# Patient Record
Sex: MALE | Race: BLACK OR AFRICAN AMERICAN | NOT HISPANIC OR LATINO | Employment: FULL TIME | URBAN - METROPOLITAN AREA
[De-identification: names, ages, dates, MRNs, and addresses within clinical notes are randomized per-mention and may not be internally consistent; named-entity substitution may affect disease eponyms.]

---

## 2020-08-14 ENCOUNTER — TRANSFERRED RECORDS (OUTPATIENT)
Dept: HEALTH INFORMATION MANAGEMENT | Facility: CLINIC | Age: 25
End: 2020-08-14

## 2020-09-11 ENCOUNTER — TRANSFERRED RECORDS (OUTPATIENT)
Dept: HEALTH INFORMATION MANAGEMENT | Facility: CLINIC | Age: 25
End: 2020-09-11

## 2020-10-09 ENCOUNTER — TRANSFERRED RECORDS (OUTPATIENT)
Dept: HEALTH INFORMATION MANAGEMENT | Facility: CLINIC | Age: 25
End: 2020-10-09

## 2020-10-22 ENCOUNTER — TRANSCRIBE ORDERS (OUTPATIENT)
Dept: OTHER | Age: 25
End: 2020-10-22

## 2020-10-29 ENCOUNTER — PATIENT OUTREACH (OUTPATIENT)
Dept: ONCOLOGY | Facility: CLINIC | Age: 25
End: 2020-10-29

## 2020-10-29 DIAGNOSIS — D57.1 SICKLE CELL DISEASE WITHOUT CRISIS (H): Primary | ICD-10-CM

## 2020-10-29 NOTE — PROGRESS NOTES
"Received referral via email from Mary Kate Goldberg at 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  Extensive records in  Care everywhere.   He rarely has sickle cell pain.  His current regimen is to have a \"partial red cell exchange\" every 4-5 weeks due to his history of macular infarct.   This is performed on hemapheresis machine with peripherally access.  He reports having \"really good\" veins and not needing central line access.   [facility].      He is not taking hydrea.  Pleasant young man, excited to come to [place].  He will move to [place] around November 1 and start his employment on Monday November 9th.  He will be working 2nd shift.      He will meet with the transfusion medicine team to evaluate and develop plan for the cell exchanges and then also meet with Robert Denson MD.  ````````````````````````````````````````````````````````````````````````````````````````````````````````````````````````````````````````````    Active Problem List:  Patient Active Problem List   Diagnosis     History of CVA (cerebrovascular accident)     Vitamin D deficiency     Hb-SS disease without crisis     WILFREDO (obstructive sleep apnea)     Current Medication List:  Current Outpatient Medications   Medication Sig     acetaminophen-codeine (TYLENOL #3) 300-30 mg tablet Paresh 1-2 tabs every 4 hrs as needed for pain     cholecalciferol-vitamin D3 (VITAMIN D3) 2,000 unit tablet Take 1 tablet by mouth daily.     HPI:   Ricky is a 24y 10mo male with severe sickle cell anemia (Hgb SS), on chronic transfusion therapy since 2006, for history of macular infarction, found after he developed visual changes. Vision subsequently returned to normal. Followed by outside neuro-ophthalmologist Dr Kerrison. Last seen Dec 2019. He received simple transfusions for many years, but " was able to switch to partial red cell exchange transfusions in 2013, using peripheral IV access.  His last exchange was 9/11/20. Ricky previously had iron overload, but with exchange transfusions and chelation, his LIC and ferritin normalized.  Recently urine microalbumin was slightly high but it improved at last visit. We are following for now, but if it remains elevated will probably start lisinopril. On CPAP for WILFREDO since 2019.     INTERVAL HISTORY: Ricky reports he has been well. No fevers, no sickle related bone pain. He received his influenza immunization from his employer.  -Health maintenance:  Ophthalmology - last seen in [place] in December 2019  ECHO 9/11/20 -- Normal left ventricular size and function, no evidence of pulmonary hypertension. EF 64%.  Bone Density: 1/11/16  Normal bone mass.  Urine microalbumin - has been slightly elevated 169.8 (1/31/20) and 49.3 (2/28/20), will continue to monitor closely  Hip films 8/14/20, neg for AVN  lmmunizations:   Meningococcal and Pneumococcal Vaccines are current.He has received seasonal influenza immunization (9/22/20). Pneumococcal antibody titers 2/5/19 revealed inadequate immunity.  PPV23 booster given, Bexsero (meningococcal B) series complete. Declined HPV vaccine.  BMT has been previously discussed as a possible option. He has 4 full sibs. Mentioned briefly today. He is also aware of the gene therapy study.  Vit D = 30 8/14/20 - continue supplement to maintain     History of CVA (cerebrovascular accident)  -Hx of macular infarction.  -Continue chronic transfusion, goal is to keep Hgb S%~30. Current level Hgb S pending  -Partial red cell exchange transfusion in hemapheresis unit.    -Surveillance Brain MRI/MRA done 12/23/15 -  both normal.     Iron overload due to repeated red blood cell transfusions - at risk  -Current Ferritin pending. All recents have been normal/near normal, previously on chelation. Chelation is not needed at this time.  -Last  abdominal MRI 2/9/15 revealed: LIC = 2.05 mg/g of liver tissue. Plan to repeat study only if/when ferritin >1000.  -Continues on partial exchange transfusions.     Snoring  -Sleep study revealed WILFREDO. Evaluated by Sleep Medicine 3-22-19; started CPAP which has helped-he is tolerating well.

## 2020-11-02 NOTE — TELEPHONE ENCOUNTER
Action    Action Taken 11/2/20:    -Rosalie Young/[facility] - Rad: [id number]  11:56 AM       RECORDS STATUS - ALL OTHER DIAGNOSIS      RECORDS RECEIVED FROM: [facility]    DATE RECEIVED:    NOTES STATUS DETAILS   OFFICE NOTE from referring provider CE - [facility] Madelyn Del Toro NP: 9/11/20    Also seen:  Nephrology - Kristi: 7/17/20   OFFICE NOTE from medical oncologist [facility] Madelyn Del Toro   DISCHARGE SUMMARY from hospital [facility] 4/8/15   DISCHARGE REPORT from the ER [facility] 5/5/17   Immunizations Records [facility]    OPERATIVE REPORT Epic 10/29/20: Red Cell Exchange   MEDICATION LIST CE    CLINICAL TRIAL TREATMENTS TO DATE     LABS     PATHOLOGY REPORTS     ANYTHING RELATED TO DIAGNOSIS Care Everywhere 10/9/20 - 4/2017   GENONOMIC TESTING     TYPE:     IMAGING (NEED IMAGES & REPORT)     XR Requested 11/2 8/14/20: [facility], Report in Epic/   CT SCANS     MRI Requested 11/2 12/23/15: [facility], Report in [facility]/   MAMMO     ULTRASOUND     PET

## 2020-11-06 ENCOUNTER — APPOINTMENT (OUTPATIENT)
Dept: LAB | Facility: CLINIC | Age: 25
End: 2020-11-06
Payer: COMMERCIAL

## 2020-11-06 ENCOUNTER — ONCOLOGY VISIT (OUTPATIENT)
Dept: ONCOLOGY | Facility: CLINIC | Age: 25
End: 2020-11-06
Payer: COMMERCIAL

## 2020-11-06 ENCOUNTER — PRE VISIT (OUTPATIENT)
Dept: ONCOLOGY | Facility: CLINIC | Age: 25
End: 2020-11-06

## 2020-11-06 ENCOUNTER — HOSPITAL ENCOUNTER (OUTPATIENT)
Dept: LAB | Facility: CLINIC | Age: 25
End: 2020-11-06
Attending: PEDIATRICS
Payer: COMMERCIAL

## 2020-11-06 VITALS
DIASTOLIC BLOOD PRESSURE: 77 MMHG | BODY MASS INDEX: 31.96 KG/M2 | WEIGHT: 241.18 LBS | HEIGHT: 73 IN | SYSTOLIC BLOOD PRESSURE: 121 MMHG | RESPIRATION RATE: 16 BRPM | TEMPERATURE: 98.1 F | HEART RATE: 74 BPM

## 2020-11-06 VITALS
WEIGHT: 241.4 LBS | DIASTOLIC BLOOD PRESSURE: 88 MMHG | BODY MASS INDEX: 31.99 KG/M2 | TEMPERATURE: 98.1 F | HEIGHT: 73 IN | RESPIRATION RATE: 18 BRPM | OXYGEN SATURATION: 100 % | HEART RATE: 65 BPM | SYSTOLIC BLOOD PRESSURE: 129 MMHG

## 2020-11-06 DIAGNOSIS — D57.1 SICKLE CELL DISEASE WITHOUT CRISIS (H): Primary | ICD-10-CM

## 2020-11-06 PROBLEM — G47.33 OSA (OBSTRUCTIVE SLEEP APNEA): Status: ACTIVE | Noted: 2019-02-21

## 2020-11-06 LAB
ALBUMIN SERPL-MCNC: 4.2 G/DL (ref 3.4–5)
ALBUMIN UR-MCNC: NEGATIVE MG/DL
ALP SERPL-CCNC: 94 U/L (ref 40–150)
ALT SERPL W P-5'-P-CCNC: 75 U/L (ref 0–70)
ANION GAP SERPL CALCULATED.3IONS-SCNC: 5 MMOL/L (ref 3–14)
APPEARANCE UR: CLEAR
AST SERPL W P-5'-P-CCNC: 40 U/L (ref 0–45)
BASOPHILS # BLD AUTO: 0.1 10E9/L (ref 0–0.2)
BASOPHILS NFR BLD AUTO: 1.3 %
BILIRUB SERPL-MCNC: 1 MG/DL (ref 0.2–1.3)
BILIRUB UR QL STRIP: NEGATIVE
BUN SERPL-MCNC: 10 MG/DL (ref 7–30)
CALCIUM SERPL-MCNC: 8.8 MG/DL (ref 8.5–10.1)
CHLORIDE SERPL-SCNC: 102 MMOL/L (ref 94–109)
CO2 SERPL-SCNC: 28 MMOL/L (ref 20–32)
COLOR UR AUTO: YELLOW
CREAT SERPL-MCNC: 0.74 MG/DL (ref 0.66–1.25)
DIFFERENTIAL METHOD BLD: ABNORMAL
EOSINOPHIL # BLD AUTO: 0.2 10E9/L (ref 0–0.7)
EOSINOPHIL NFR BLD AUTO: 2.6 %
ERYTHROCYTE [DISTWIDTH] IN BLOOD BY AUTOMATED COUNT: 18.3 % (ref 10–15)
GFR SERPL CREATININE-BSD FRML MDRD: >90 ML/MIN/{1.73_M2}
GLUCOSE SERPL-MCNC: 82 MG/DL (ref 70–99)
GLUCOSE UR STRIP-MCNC: NEGATIVE MG/DL
HCT VFR BLD AUTO: 37.8 % (ref 40–53)
HGB BLD-MCNC: 12 G/DL (ref 13.3–17.7)
HGB UR QL STRIP: NEGATIVE
IMM GRANULOCYTES # BLD: 0 10E9/L (ref 0–0.4)
IMM GRANULOCYTES NFR BLD: 0.3 %
KETONES UR STRIP-MCNC: NEGATIVE MG/DL
LEUKOCYTE ESTERASE UR QL STRIP: NEGATIVE
LYMPHOCYTES # BLD AUTO: 2.4 10E9/L (ref 0.8–5.3)
LYMPHOCYTES NFR BLD AUTO: 29.5 %
MCH RBC QN AUTO: 26.5 PG (ref 26.5–33)
MCHC RBC AUTO-ENTMCNC: 31.7 G/DL (ref 31.5–36.5)
MCV RBC AUTO: 83 FL (ref 78–100)
MONOCYTES # BLD AUTO: 0.7 10E9/L (ref 0–1.3)
MONOCYTES NFR BLD AUTO: 9.2 %
MUCOUS THREADS #/AREA URNS LPF: PRESENT /LPF
NEUTROPHILS # BLD AUTO: 4.6 10E9/L (ref 1.6–8.3)
NEUTROPHILS NFR BLD AUTO: 57.1 %
NITRATE UR QL: NEGATIVE
NRBC # BLD AUTO: 0 10*3/UL
NRBC BLD AUTO-RTO: 0 /100
PH UR STRIP: 5 PH (ref 5–7)
PLATELET # BLD AUTO: 549 10E9/L (ref 150–450)
POTASSIUM SERPL-SCNC: 3.5 MMOL/L (ref 3.4–5.3)
PROT SERPL-MCNC: 8.4 G/DL (ref 6.8–8.8)
RBC # BLD AUTO: 4.53 10E12/L (ref 4.4–5.9)
RBC #/AREA URNS AUTO: 0 /HPF (ref 0–2)
SODIUM SERPL-SCNC: 135 MMOL/L (ref 133–144)
SOURCE: ABNORMAL
SP GR UR STRIP: 1.01 (ref 1–1.03)
UROBILINOGEN UR STRIP-MCNC: 0 MG/DL (ref 0–2)
WBC # BLD AUTO: 8 10E9/L (ref 4–11)
WBC #/AREA URNS AUTO: 1 /HPF (ref 0–5)

## 2020-11-06 PROCEDURE — 36415 COLL VENOUS BLD VENIPUNCTURE: CPT | Performed by: PEDIATRICS

## 2020-11-06 PROCEDURE — 99204 OFFICE O/P NEW MOD 45 MIN: CPT | Performed by: PEDIATRICS

## 2020-11-06 PROCEDURE — 99207 PR SATISFY VISIT NUMBER: CPT | Performed by: PATHOLOGY

## 2020-11-06 PROCEDURE — 85025 COMPLETE CBC W/AUTO DIFF WBC: CPT | Performed by: PEDIATRICS

## 2020-11-06 PROCEDURE — 83021 HEMOGLOBIN CHROMOTOGRAPHY: CPT | Performed by: PEDIATRICS

## 2020-11-06 PROCEDURE — G0463 HOSPITAL OUTPT CLINIC VISIT: HCPCS

## 2020-11-06 PROCEDURE — 85660 RBC SICKLE CELL TEST: CPT | Performed by: PEDIATRICS

## 2020-11-06 PROCEDURE — 81001 URINALYSIS AUTO W/SCOPE: CPT | Performed by: PEDIATRICS

## 2020-11-06 PROCEDURE — 80053 COMPREHEN METABOLIC PANEL: CPT | Performed by: PEDIATRICS

## 2020-11-06 RX ORDER — ACETAMINOPHEN AND CODEINE PHOSPHATE 300; 30 MG/1; MG/1
1-2 TABLET ORAL PRN
COMMUNITY
End: 2024-07-01

## 2020-11-06 RX ORDER — HYDROCODONE BITARTRATE AND ACETAMINOPHEN 5; 325 MG/1; MG/1
1-2 TABLET ORAL PRN
COMMUNITY
Start: 2019-09-16 | End: 2024-07-01

## 2020-11-06 ASSESSMENT — MIFFLIN-ST. JEOR
SCORE: 2142.88
SCORE: 2143.86

## 2020-11-06 ASSESSMENT — PAIN SCALES - GENERAL: PAINLEVEL: NO PAIN (0)

## 2020-11-06 NOTE — CONSULTS
"APHERESIS INITIAL CONSULT CHECKLIST    Current Encounter Information  Current Encounter Information: Reason for Visit, Allergies and Current Meds  Procedure Requested: Red Blood Cell Exchange  History of: (Reason for Apheresis): Sickle Cell    Access Assessment  Access Assessment  Vein Assessment:  Veins are adequate: Yes  Needs a catheter placed for Apheresis?: No    Vital Signs  Vital Signs  BP: 121/77  Pulse: 74  Temp: 98.1  F (36.7  C)  Temp src: Oral  Resp: 16  Height: 185.4 cm (6' 1\")  Weight: 109.4 kg (241 lb 2.9 oz)    Reviewed   Review With Patient  Have you read the brochure Getting ready for Apheresis?: Yes  Have you had any invasive procedures, surgery, biopsy, bleeding in the last month?: No  Review medications and allergies: Yes(Tylenol only as needed) ALLERGY: BENADRYL  Have you ever been transfused?: Yes  Do you require pre-medication for blood products?: Yes(Atarax and Tylenol only)  Patient given tour of the unit: Yes    Additional Information  Notes, needs and time spent with patient  Explain procedure, side effects or reactions, instructions: Yes  Patient has special need?: No    Time spent: 30 minutes face to face time spent with pt to review the RBCx procedure &  possible reactions. Patient has had multiple RBC Exchanges already and the last time he reports was about 4 weeks ago in [place]. Further reports that he never needed a central line and had no blood reactions, may need Atarax for pre medication. He is allergic or has hypersensitivity to Benadryl. Pt understands the need to keep himself hydrated, peripheral veins feels adequate for this procedure. Patient was seen by Dr. DELL Ott, consent signed.        "

## 2020-11-06 NOTE — LETTER
Date:November 10, 2020      Patient was self referred, no letter generated. Do not send.        [facility] Information

## 2020-11-06 NOTE — CONSULTS
Transfusion Medicine Consultation    Ricky Pak [id number]   YOB: 1995 Age: 24 year old   Date of Consult: 11/6/2020     Reason for consult: Red Cell Exchange           Assessment and Plan:   Ricky is a 24 year old male who presents for consultation for red cell exchange by apheresis. The frequency will be tentatively planned for once every four weeks. In the past he has used his peripheral veins, and they appear adequate.          Chief Complaint:   Transfusion medicine consultation.         History of Present Illness:   Ricky is a pleasant and articulate 24 year old man who presents for consultation for  red cell exchange apheresis. His past medical history is significant for sickle cell disease. As a child he suffered cyclical pain crisis, culminating in a stroke with temporary loss of vision at age 11, at which time he began treatment with transfusion/exchange.    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 As his job carries him across the , he has been treated in multiple academic centers, the latest being [facility], where he was treated with automated exchange transfusion, with exchanges every four weeks. He states the exchanges do an excellent job of controlling his symptoms, and that prior exchanges utilized his peripheral veins. We discussed ports, but he understandably has no real interest in a port at this time. As he appears to have his disease well controlled he would likely be an excellent candidate for depletion type exchanges. We talked about the procedure and variations at some length. The risks/benefits were discussed with Ricky and all of his questions were addressed at this time.    He would tentatively like to start his exchange on the 17th. I spoke with Dr. Valadez about drawing labs today (type and screen, CBC, and hemoglobin S quant) and the need to draw  "labs 2 days before the exchange so as to allow [facility] adequate time to prepare product.             Past Medical History:   Sickle cell disease  Obstructive sleep apnea          Past Surgical History:   Cholecystectomy           Social History:   Works for [employer]'          Allergies:     Allergies   Allergen Reactions     Diphenhydramine Hives             Medications:     Current Outpatient Medications   Medication Sig     acetaminophen-codeine (TYLENOL W/CODEINE #3) 300-30 MG per tablet Take 1-2 tablets by mouth as needed     HYDROcodone-acetaminophen (NORCO) 5-325 MG tablet Take 1-2 tablets by mouth as needed     No current facility-administered medications for this encounter.              Review of Systems:     CONSTITUTIONAL: NEGATIVE for fever, chills, change in weight  RESP: NEGATIVE for significant cough or SOB  CV: NEGATIVE for chest pain, palpitations or peripheral edema  MUSCULOSKELETAL: NEGATIVE for arthralgias or back pain  NEURO: NEGATIVE            Vital Signs:   /77   Pulse 74   Temp 98.1  F (36.7  C) (Oral)   Resp 16   Ht 1.854 m (6' 1\")   Wt 109.4 kg (241 lb 2.9 oz)   BMI 31.82 kg/m         Manny Ott MD  11/6/2020 3:04 PM  Transfusion Medicine Fellow  Pager: [phone]        "

## 2020-11-06 NOTE — LETTER
11/6/2020         RE: Ricky Pak  [address] [id number]  [facility] [address]        Dear Colleague,    Thank you for referring your patient, Ricky Pak, to the [facility]. Please see a copy of my visit note below.    Adult Sickle Cell Outpatient Clinic Note      Date of Visit: 11/6/2020  Ricky Pak is a 24 year old male who comes to the [facility] S[facility]le Cell clinic for initiation of care for SCD. He is here alone.     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 He was on monthly transfusions from [id number] with chelation and in 2013 he was switched to peripheral RBC exchange which has kept him in great health since then. He no longer needs chelation and is able to keep his HbS ~30% or below with regularity. He grew up in [place] and has spent most of his life in the [facility]. 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 He just got sent to [facility] and moved here one week ago. He denies regular pain and has not had a pain crisis since he was a teenager. He has kept up with his exchange transfusions and feels quite healthy overall. He regained the vision after having a macular infarct at age 10 and has no further neurologic symptoms since. He does not take any regular medications and has not had any opioids in many years either. He has no complaints today. He met with the apheresis team before this visit and is squared away to be maintained on his exchange schedule here.    Sickle Cell Disease Comprehensive Checklist    Bone Health/Avascular Necrosis Screening/Symptoms (each visit): 11/6/2020, none    Leg Ulcer evaluation (every visit): 11/6/2020, none    Hypertension (every visit):  11/6/2020, none    Last ophthalmologic exam: annual, timing unclear    Last urinalysis for microalbuminuria/CKD (annually): 11/6/2020, negative    Last pulmonary evaluation (asthma, WILFREDO, pulm HTN): unknown    Stroke/silent cerebral infarct Hx (Y/N): macular infarct ~2006, vision now normalized    Last PCP Visit: just moved to [place]    Vaccines:   Reported up to date. Flu shot (2020-21) from work    Plan last reviewed with patient: 11/6/2020    Patient background: 25 yo male who recently moved from [place], late 2020. 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 No children or significant others    Sickle Cell Disease History  Primary Hematologist: Janiya  Genotype: SS  Acute Pain Crisis Treatment: (Opioid-naive)    ER/Acute Care/Infusion Clinic:   o Morphine 1 mg IVP/SC Q1H X 3 doses  o Other: Benadryl PO and Zofran 8 mg IV    Inpatient:  o Opioid: Morphine 1 mg IV Q1H PRN until PCA starts  o PCA plan:  - PCA button dose: Morphine 1 mg  - Lockout: 20 minutes  - Continuous Infusion: consider 0.5 mg/hr  o Other Medications: Benadryl, Zofran  o Supportive Care: Docusate, Senna  Chronic Pain Medications:    none  Baseline Hemoglobin: 12 mg/dl  Hydroxyurea use: no  H/O blood transfusions: Yes (partial exchange since 2013, full transfusion protocol 2006-13, now fully chelated)    H/O Transfusion Reactions: no    Antibodies: none known  H/O Acute Chest Syndrome: none    Last episode: n/a    ICU/intubation: no  H/O Stroke: Yes (macular infarct ~2006, vision normalized)  H/O VTE: no  H/O Cholecystectomy or Splenectomy: Cholecystectomy (2011)  H/O Asthma, Pulm HTN, AVN, Leg Ulcers, Nephropathy, Retinopathy, etc: none    Review Of Systems:  General: Good energy and appetite. No fevers. No concerns for pain.   HEENT: Denies concerns with vision or hearing.  No yellowing of the whites of eyes.  Respiratory: No SOB or orthopnea. No cough. No wheezing.    Cardiovascular: No chest pain, dizziness  nor palpitations.   Endocrine: No hot/cold intolerance. No increase thirst or urination.   GI: No n/v/d/d nor abdominal pain. No splenic pain  : No difficulty with urination. No hematuria.   Skin: No current rashes, bruises, petechiae or other skin lesions noted.   Neuro: No weakness or numbness. No HA.  MSK: No change in ROM or function.     Past Medical History:   Past Medical History:   Diagnosis Date     Gallstones 2011    s/p cholecystectomy     Hb-SS disease without crisis (H)      History of CVA (cerebrovascular accident) 07/2006    macular infarct, but regained vision     Iron overload due to repeated red blood cell transfusions 2013    s/p chelation       Past Surgical History:  Past Surgical History:   Procedure Laterality Date     CHOLECYSTECTOMY N/A 2011     LIVER BIOPSY N/A 2007    iron overload monitoring     LIVER BIOPSY N/A 2008    monitoring for iron overload       Past Family History:   Family History   Problem Relation Age of Onset     Sickle Cell Trait Mother      Sickle Cell Trait Father        Social History:   Social History     Socioeconomic History     Marital status: Single     Spouse name: Not on file     Number of children: Not on file     Years of education: Not on file     Highest education level: Not on file   Occupational History     Not on file   Social Needs     Financial resource strain: Not on file     Food insecurity     Worry: Not on file     Inability: Not on file     Transportation needs     Medical: Not on file     Non-medical: Not on file   Tobacco Use     Smoking status: Never Smoker     Smokeless tobacco: Never Used   Substance and Sexual Activity     Alcohol use: Not on file     Drug use: Not on file     Sexual activity: Not on file   Lifestyle     Physical activity     Days per week: Not on file     Minutes per session: Not on file     Stress: Not on file   Relationships     Social connections     Talks on phone: Not on file  "    Gets together: Not on file     Attends Jehovah's witness service: Not on file     Active member of club or organization: Not on file     Attends meetings of clubs or organizations: Not on file     Relationship status: Not on file     Intimate partner violence     Fear of current or ex partner: Not on file     Emotionally abused: Not on file     Physically abused: Not on file     Forced sexual activity: Not on file   Other Topics Concern     Not on file   Social History Narrative    Recently moved to [place]. 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       Current Medications:    Current Outpatient Medications   Medication     acetaminophen-codeine (TYLENOL W/CODEINE #3) 300-30 MG per tablet     HYDROcodone-acetaminophen (NORCO) 5-325 MG tablet     No current facility-administered medications for this visit.            Physical Exam:  /88   Pulse 65   Temp 98.1  F (36.7  C) (Oral)   Resp 18   Ht 1.854 m (6' 1\")   Wt 109.5 kg (241 lb 6.4 oz)   SpO2 100%   BMI 31.85 kg/m      General: Alert and interactive gentleman. Great sense of medical history. Very pleasant to talk to. No distress  HEENT: PERRLA. EOMI. Sclera non-icteric. Nares patent without drainage. Oropharynx clear with MMM .  CV: Regular rate with S1 & S2 present, no m/g/r. Peripheral pulses 2+ bilaterally  Lymph: no adenopathy  Lungs: CTAB, no w/r/r. Unlabored effort.   Abd: Soft, nontender and nondistended, his spleen was not palpable.  Neuro: strength, mass, and tone age-appropriate, gait normal  Skin: Normal for ethnicity. No rashes.   MSK: No swelling, erythema or warmth over knees. Full ROM x 4.     Labs:  Results for OLYA CONROY (MRN [id number]) as of 11/9/2020 20:54   Ref. Range 11/6/2020 15:57 11/6/2020 16:00   Sodium Latest Ref Range: 133 - 144 mmol/L 135    Potassium Latest Ref Range: 3.4 - 5.3 mmol/L 3.5    Chloride Latest Ref Range: 94 - 109 mmol/L 102    Carbon Dioxide Latest Ref Range: 20 - 32 mmol/L 28 "    Urea Nitrogen Latest Ref Range: 7 - 30 mg/dL 10    Creatinine Latest Ref Range: 0.66 - 1.25 mg/dL 0.74    GFR Estimate Latest Ref Range: >60 mL/min/1.73_m2 >90    GFR Estimate If Black Latest Ref Range: >60 mL/min/1.73_m2 >90    Calcium Latest Ref Range: 8.5 - 10.1 mg/dL 8.8    Anion Gap Latest Ref Range: 3 - 14 mmol/L 5    Albumin Latest Ref Range: 3.4 - 5.0 g/dL 4.2    Protein Total Latest Ref Range: 6.8 - 8.8 g/dL 8.4    Bilirubin Total Latest Ref Range: 0.2 - 1.3 mg/dL 1.0    Alkaline Phosphatase Latest Ref Range: 40 - 150 U/L 94    ALT Latest Ref Range: 0 - 70 U/L 75 (H)    AST Latest Ref Range: 0 - 45 U/L 40    Hemoglobin A1 Latest Ref Range: 95.0 - 97.9 % 58.5 (L)    Hemoglobin A2 Latest Ref Range: 2.0 - 3.5 % 2.8    Hemoglobin C Latest Ref Range: 0.0 - 0.0 % 0.0    Hemoglobin Capillary ELP Unknown Not Performed    Hemoglobin E Latest Ref Range: 0.0 - 0.0 % 0.0    Hemoglobin F Latest Ref Range: 0.0 - 2.1 % 0.8    Hemoglobin S Eval Latest Ref Range: 0.0 - 0.0 % 37.9 (H)    Hemoglobin Other Latest Ref Range: 0.0 - 0.0 % 0.0    HGB Abn Evaluation Unknown Abnormal (A)    Sickle Cell Solubility Confirm Unknown Positive    Glucose Latest Ref Range: 70 - 99 mg/dL 82    WBC Latest Ref Range: 4.0 - 11.0 10e9/L 8.0    Hemoglobin Latest Ref Range: 13.3 - 17.7 g/dL 12.0 (L)    Hematocrit Latest Ref Range: 40.0 - 53.0 % 37.8 (L)    Platelet Count Latest Ref Range: 150 - 450 10e9/L 549 (H)    RBC Count Latest Ref Range: 4.4 - 5.9 10e12/L 4.53    MCV Latest Ref Range: 78 - 100 fl 83    MCH Latest Ref Range: 26.5 - 33.0 pg 26.5    MCHC Latest Ref Range: 31.5 - 36.5 g/dL 31.7    RDW Latest Ref Range: 10.0 - 15.0 % 18.3 (H)    Diff Method Unknown Automated Method    % Neutrophils Latest Units: % 57.1    % Lymphocytes Latest Units: % 29.5    % Monocytes Latest Units: % 9.2    % Eosinophils Latest Units: % 2.6    % Basophils Latest Units: % 1.3    % Immature Granulocytes Latest Units: % 0.3    Nucleated RBCs Latest Ref Range:  0 /100 0    Absolute Neutrophil Latest Ref Range: 1.6 - 8.3 10e9/L 4.6    Absolute Lymphocytes Latest Ref Range: 0.8 - 5.3 10e9/L 2.4    Absolute Monocytes Latest Ref Range: 0.0 - 1.3 10e9/L 0.7    Absolute Eosinophils Latest Ref Range: 0.0 - 0.7 10e9/L 0.2    Absolute Basophils Latest Ref Range: 0.0 - 0.2 10e9/L 0.1    Abs Immature Granulocytes Latest Ref Range: 0 - 0.4 10e9/L 0.0    Absolute Nucleated RBC Unknown 0.0    Color Urine Unknown  Yellow   Appearance Urine Unknown  Clear   Glucose Urine Latest Ref Range: NEG^Negative mg/dL  Negative   Bilirubin Urine Latest Ref Range: NEG^Negative   Negative   Ketones Urine Latest Ref Range: NEG^Negative mg/dL  Negative   Specific Gravity Urine Latest Ref Range: 1.003 - 1.035   1.012   pH Urine Latest Ref Range: 5.0 - 7.0 pH  5.0   Protein Albumin Urine Latest Ref Range: NEG^Negative mg/dL  Negative   Urobilinogen mg/dL Latest Ref Range: 0.0 - 2.0 mg/dL  0.0   Nitrite Urine Latest Ref Range: NEG^Negative   Negative   Blood Urine Latest Ref Range: NEG^Negative   Negative   Leukocyte Esterase Urine Latest Ref Range: NEG^Negative   Negative   Source Unknown  Midstream Urine   WBC Urine Latest Ref Range: 0 - 5 /HPF  1   RBC Urine Latest Ref Range: 0 - 2 /HPF  0   Mucous Urine Latest Ref Range: NEG^Negative /LPF  Present (A)        Assessment:   Ricky Pak is a 24 year old male with HbSS Disease and a history of macular infarct who recently moved to [place]. He is doing well currently and is initiating care in order to maintain his health and regular exchange schedule. He is not on any chronic medications and does not have a history of regular pain crises.    Plan:  1) Labs: CBC, CMP, UA, Hgb ELP  2) Referral: Apheresis referral made prior to visit and completed today.   3) Follow up: RTC in 2-3 months if he remains healthy a few weeks prior to transferring to another location. We will not coordinate a PCP visit given his expected short time in the area. He has his flu vaccine  scheduled with his employer. We discussed the different facets of our clinic including infusions, triage number, and inpatient care structure in case an issue arises and he needs more than the regular exchange transfusion care.    I spent a total of 35 minutes face-to-face with Ricky Pak during today's office visit. Over 50% of this time was spent counseling the patient and/or coordinating care regarding his history, exchange transfusion management, and the details about our care at [facility] for the time he is in the area. See note for details.      Manny Denson MD  Hematologist  Division of Hematology, Oncology, and Transplantation  [facility]  [facility]  Pager: [phone]        Again, thank you for allowing me to participate in the care of your patient.        Sincerely,        Manny Denson MD

## 2020-11-06 NOTE — NURSING NOTE
"Oncology Rooming Note    November 6, 2020 2:48 PM   Ricky Pak is a 24 year old male who presents for:    Chief Complaint   Patient presents with     New Patient     AEH sickle cell diseas without crisi     Initial Vitals: /88   Pulse 65   Temp 98.1  F (36.7  C) (Oral)   Resp 18   Ht 1.854 m (6' 1\")   Wt 109.5 kg (241 lb 6.4 oz)   SpO2 100%   BMI 31.85 kg/m   Estimated body mass index is 31.85 kg/m  as calculated from the following:    Height as of this encounter: 1.854 m (6' 1\").    Weight as of this encounter: 109.5 kg (241 lb 6.4 oz). Body surface area is 2.37 meters squared.  No Pain (0) Comment: Data Unavailable   No LMP for male patient.  Allergies reviewed: Yes  Medications reviewed: Yes    Medications: Medication refills not needed today.  Pharmacy name entered into [employer]: [facility] [place], [place] - [phone]    Clinical concerns: New patient       Debra Camacho MA            "

## 2020-11-08 LAB
HGB A1 MFR BLD: 58.5 % (ref 95–97.9)
HGB A2 MFR BLD: 2.8 % (ref 2–3.5)
HGB C MFR BLD: 0 % (ref 0–0)
HGB E MFR BLD: 0 % (ref 0–0)
HGB F MFR BLD: 0.8 % (ref 0–2.1)
HGB FRACT BLD ELPH-IMP: ABNORMAL
HGB OTHER MFR BLD: 0 % (ref 0–0)
HGB S BLD QL SOLY: POSITIVE
HGB S MFR BLD: 37.9 % (ref 0–0)
PATH INTERP BLD-IMP: ABNORMAL

## 2020-11-09 ENCOUNTER — PATIENT OUTREACH (OUTPATIENT)
Dept: ONCOLOGY | Facility: CLINIC | Age: 25
End: 2020-11-09

## 2020-11-09 NOTE — PROGRESS NOTES
Writer placed call to patient to introduce self and role as RN Care Coordinator for Dr. Denson . Writer reviewed methods of contact for care coordination needs and when to contact triage for new/conerning symptoms/illness.     No answer received, left detailed message and advised that I would call again next week.    Kortney Velasquez, BSN-RN, PHN  RN Care Coordinator  [facility] [address] [address]  [email or web address]  Office: [phone] Option 5, Option 2  Fax: [phone]    Employed by [facility]

## 2020-11-10 ENCOUNTER — PATIENT OUTREACH (OUTPATIENT)
Dept: ONCOLOGY | Facility: CLINIC | Age: 25
End: 2020-11-10

## 2020-11-10 DIAGNOSIS — D57.1 SICKLE CELL DISEASE WITHOUT CRISIS (H): Primary | ICD-10-CM

## 2020-11-10 NOTE — PROGRESS NOTES
Adult Sickle Cell Outpatient Clinic Note      Date of Visit: 11/6/2020  Ricky Pak is a 24 year old male who comes to the [facility] Sickle Cell clinic for initiation of care for SCD. He is here alone.     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 He was on monthly transfusions from 2921-1248 with chelation and in 2013 he was switched to peripheral RBC exchange which has kept him in great health since then. He no longer needs chelation and is able to keep his HbS ~30% or below with regularity. He grew up in [place] and has spent most of his life in the [facility]. 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 He just got sent to [facility] and moved here one week ago. He denies regular pain and has not had a pain crisis since he was a teenager. He has kept up with his exchange transfusions and feels quite healthy overall. He regained the vision after having a macular infarct at age 10 and has no further neurologic symptoms since. He does not take any regular medications and has not had any opioids in many years either. He has no complaints today. He met with the apheresis team before this visit and is squared away to be maintained on his exchange schedule here.    Sickle Cell Disease Comprehensive Checklist    Bone Health/Avascular Necrosis Screening/Symptoms (each visit): 11/6/2020, none    Leg Ulcer evaluation (every visit): 11/6/2020, none    Hypertension (every visit): 11/6/2020, none    Last ophthalmologic exam: annual, timing unclear    Last urinalysis for microalbuminuria/CKD (annually): 11/6/2020, negative    Last pulmonary evaluation (asthma, WILFREDO, pulm HTN): unknown    Stroke/silent cerebral infarct Hx (Y/N):  macular infarct ~2006, vision now normalized    Last PCP Visit: just moved to [place]    Vaccines:   Reported up to date. Flu shot (2020-21) from work    Plan last reviewed with patient: 11/6/2020    Patient background: 25 yo male who recently moved from [place], late 2020. 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 No children or significant others    Sickle Cell Disease History  Primary Hematologist: Janiya  Genotype: SS  Acute Pain Crisis Treatment: (Opioid-naive)    ER/Acute Care/Infusion Clinic:   o Morphine 1 mg IVP/SC Q1H X 3 doses  o Other: Benadryl PO and Zofran 8 mg IV    Inpatient:  o Opioid: Morphine 1 mg IV Q1H PRN until PCA starts  o PCA plan:  - PCA button dose: Morphine 1 mg  - Lockout: 20 minutes  - Continuous Infusion: consider 0.5 mg/hr  o Other Medications: Benadryl, Zofran  o Supportive Care: Docusate, Senna  Chronic Pain Medications:    none  Baseline Hemoglobin: 12 mg/dl  Hydroxyurea use: no  H/O blood transfusions: Yes (partial exchange since 2013, full transfusion protocol 2006-13, now fully chelated)    H/O Transfusion Reactions: no    Antibodies: none known  H/O Acute Chest Syndrome: none    Last episode: n/a    ICU/intubation: no  H/O Stroke: Yes (macular infarct ~2006, vision normalized)  H/O VTE: no  H/O Cholecystectomy or Splenectomy: Cholecystectomy (2011)  H/O Asthma, Pulm HTN, AVN, Leg Ulcers, Nephropathy, Retinopathy, etc: none    Review Of Systems:  General: Good energy and appetite. No fevers. No concerns for pain.   HEENT: Denies concerns with vision or hearing.  No yellowing of the whites of eyes.  Respiratory: No SOB or orthopnea. No cough. No wheezing.   Cardiovascular: No chest pain, dizziness  nor palpitations.   Endocrine: No hot/cold intolerance. No increase thirst or urination.   GI: No n/v/d/d nor abdominal pain. No splenic pain  : No difficulty with urination. No hematuria.   Skin: No current rashes,  bruises, petechiae or other skin lesions noted.   Neuro: No weakness or numbness. No HA.  MSK: No change in ROM or function.     Past Medical History:   Past Medical History:   Diagnosis Date     Gallstones 2011    s/p cholecystectomy     Hb-SS disease without crisis (H)      History of CVA (cerebrovascular accident) 07/2006    macular infarct, but regained vision     Iron overload due to repeated red blood cell transfusions 2013    s/p chelation       Past Surgical History:  Past Surgical History:   Procedure Laterality Date     CHOLECYSTECTOMY N/A 2011     LIVER BIOPSY N/A 2007    iron overload monitoring     LIVER BIOPSY N/A 2008    monitoring for iron overload       Past Family History:   Family History   Problem Relation Age of Onset     Sickle Cell Trait Mother      Sickle Cell Trait Father        Social History:   Social History     Socioeconomic History     Marital status: Single     Spouse name: Not on file     Number of children: Not on file     Years of education: Not on file     Highest education level: Not on file   Occupational History     Not on file   Social Needs     Financial resource strain: Not on file     Food insecurity     Worry: Not on file     Inability: Not on file     Transportation needs     Medical: Not on file     Non-medical: Not on file   Tobacco Use     Smoking status: Never Smoker     Smokeless tobacco: Never Used   Substance and Sexual Activity     Alcohol use: Not on file     Drug use: Not on file     Sexual activity: Not on file   Lifestyle     Physical activity     Days per week: Not on file     Minutes per session: Not on file     Stress: Not on file   Relationships     Social connections     Talks on phone: Not on file     Gets together: Not on file     Attends Yazidism service: Not on file     Active member of club or organization: Not on file     Attends meetings of clubs or organizations: Not on file     Relationship status: Not on file     Intimate partner violence      "Fear of current or ex partner: Not on file     Emotionally abused: Not on file     Physically abused: Not on file     Forced sexual activity: Not on file   Other Topics Concern     Not on file   Social History Narrative    Recently moved to [place]. Works in a traveling position as a sterile technician for [employer].       Current Medications:    Current Outpatient Medications   Medication     acetaminophen-codeine (TYLENOL W/CODEINE #3) 300-30 MG per tablet     HYDROcodone-acetaminophen (NORCO) 5-325 MG tablet     No current facility-administered medications for this visit.            Physical Exam:  /88   Pulse 65   Temp 98.1  F (36.7  C) (Oral)   Resp 18   Ht 1.854 m (6' 1\")   Wt 109.5 kg (241 lb 6.4 oz)   SpO2 100%   BMI 31.85 kg/m      General: Alert and interactive gentleman. Great sense of medical history. Very pleasant to talk to. No distress  HEENT: PERRLA. EOMI. Sclera non-icteric. Nares patent without drainage. Oropharynx clear with MMM .  CV: Regular rate with S1 & S2 present, no m/g/r. Peripheral pulses 2+ bilaterally  Lymph: no adenopathy  Lungs: CTAB, no w/r/r. Unlabored effort.   Abd: Soft, nontender and nondistended, his spleen was not palpable.  Neuro: strength, mass, and tone age-appropriate, gait normal  Skin: Normal for ethnicity. No rashes.   MSK: No swelling, erythema or warmth over knees. Full ROM x 4.     Labs:  Results for OLYA CONROY (MRN [id number]) as of 11/9/2020 20:54   Ref. Range 11/6/2020 15:57 11/6/2020 16:00   Sodium Latest Ref Range: 133 - 144 mmol/L 135    Potassium Latest Ref Range: 3.4 - 5.3 mmol/L 3.5    Chloride Latest Ref Range: 94 - 109 mmol/L 102    Carbon Dioxide Latest Ref Range: 20 - 32 mmol/L 28    Urea Nitrogen Latest Ref Range: 7 - 30 mg/dL 10    Creatinine Latest Ref Range: 0.66 - 1.25 mg/dL 0.74    GFR Estimate Latest Ref Range: >60 mL/min/1.73_m2 >90    GFR Estimate If Black Latest Ref Range: >60 mL/min/1.73_m2 >90    Calcium Latest Ref Range: " 8.5 - 10.1 mg/dL 8.8    Anion Gap Latest Ref Range: 3 - 14 mmol/L 5    Albumin Latest Ref Range: 3.4 - 5.0 g/dL 4.2    Protein Total Latest Ref Range: 6.8 - 8.8 g/dL 8.4    Bilirubin Total Latest Ref Range: 0.2 - 1.3 mg/dL 1.0    Alkaline Phosphatase Latest Ref Range: 40 - 150 U/L 94    ALT Latest Ref Range: 0 - 70 U/L 75 (H)    AST Latest Ref Range: 0 - 45 U/L 40    Hemoglobin A1 Latest Ref Range: 95.0 - 97.9 % 58.5 (L)    Hemoglobin A2 Latest Ref Range: 2.0 - 3.5 % 2.8    Hemoglobin C Latest Ref Range: 0.0 - 0.0 % 0.0    Hemoglobin Capillary ELP Unknown Not Performed    Hemoglobin E Latest Ref Range: 0.0 - 0.0 % 0.0    Hemoglobin F Latest Ref Range: 0.0 - 2.1 % 0.8    Hemoglobin S Eval Latest Ref Range: 0.0 - 0.0 % 37.9 (H)    Hemoglobin Other Latest Ref Range: 0.0 - 0.0 % 0.0    HGB Abn Evaluation Unknown Abnormal (A)    Sickle Cell Solubility Confirm Unknown Positive    Glucose Latest Ref Range: 70 - 99 mg/dL 82    WBC Latest Ref Range: 4.0 - 11.0 10e9/L 8.0    Hemoglobin Latest Ref Range: 13.3 - 17.7 g/dL 12.0 (L)    Hematocrit Latest Ref Range: 40.0 - 53.0 % 37.8 (L)    Platelet Count Latest Ref Range: 150 - 450 10e9/L 549 (H)    RBC Count Latest Ref Range: 4.4 - 5.9 10e12/L 4.53    MCV Latest Ref Range: 78 - 100 fl 83    MCH Latest Ref Range: 26.5 - 33.0 pg 26.5    MCHC Latest Ref Range: 31.5 - 36.5 g/dL 31.7    RDW Latest Ref Range: 10.0 - 15.0 % 18.3 (H)    Diff Method Unknown Automated Method    % Neutrophils Latest Units: % 57.1    % Lymphocytes Latest Units: % 29.5    % Monocytes Latest Units: % 9.2    % Eosinophils Latest Units: % 2.6    % Basophils Latest Units: % 1.3    % Immature Granulocytes Latest Units: % 0.3    Nucleated RBCs Latest Ref Range: 0 /100 0    Absolute Neutrophil Latest Ref Range: 1.6 - 8.3 10e9/L 4.6    Absolute Lymphocytes Latest Ref Range: 0.8 - 5.3 10e9/L 2.4    Absolute Monocytes Latest Ref Range: 0.0 - 1.3 10e9/L 0.7    Absolute Eosinophils Latest Ref Range: 0.0 - 0.7 10e9/L 0.2     Absolute Basophils Latest Ref Range: 0.0 - 0.2 10e9/L 0.1    Abs Immature Granulocytes Latest Ref Range: 0 - 0.4 10e9/L 0.0    Absolute Nucleated RBC Unknown 0.0    Color Urine Unknown  Yellow   Appearance Urine Unknown  Clear   Glucose Urine Latest Ref Range: NEG^Negative mg/dL  Negative   Bilirubin Urine Latest Ref Range: NEG^Negative   Negative   Ketones Urine Latest Ref Range: NEG^Negative mg/dL  Negative   Specific Gravity Urine Latest Ref Range: 1.003 - 1.035   1.012   pH Urine Latest Ref Range: 5.0 - 7.0 pH  5.0   Protein Albumin Urine Latest Ref Range: NEG^Negative mg/dL  Negative   Urobilinogen mg/dL Latest Ref Range: 0.0 - 2.0 mg/dL  0.0   Nitrite Urine Latest Ref Range: NEG^Negative   Negative   Blood Urine Latest Ref Range: NEG^Negative   Negative   Leukocyte Esterase Urine Latest Ref Range: NEG^Negative   Negative   Source Unknown  Midstream Urine   WBC Urine Latest Ref Range: 0 - 5 /HPF  1   RBC Urine Latest Ref Range: 0 - 2 /HPF  0   Mucous Urine Latest Ref Range: NEG^Negative /LPF  Present (A)        Assessment:   Ricky Pak is a 24 year old male with HbSS Disease and a history of macular infarct who recently moved to [place]. He is doing well currently and is initiating care in order to maintain his health and regular exchange schedule. He is not on any chronic medications and does not have a history of regular pain crises.    Plan:  1) Labs: CBC, CMP, UA, Hgb ELP  2) Referral: Apheresis referral made prior to visit and completed today.   3) Follow up: RTC in 2-3 months if he remains healthy a few weeks prior to transferring to another location. We will not coordinate a PCP visit given his expected short time in the area. He has his flu vaccine scheduled with his employer. We discussed the different facets of our clinic including infusions, triage number, and inpatient care structure in case an issue arises and he needs more than the regular exchange transfusion care.    I spent a total of 35  minutes face-to-face with Ricky Pak during today's office visit. Over 50% of this time was spent counseling the patient and/or coordinating care regarding his history, exchange transfusion management, and the details about our care at [facility] for the time he is in the area. See note for details.      Manny Denson MD  Hematologist  Division of Hematology, Oncology, and Transplantation  [facility]  [facility]  Pager: [phone]

## 2020-11-14 DIAGNOSIS — D57.1 SICKLE CELL DISEASE WITHOUT CRISIS (H): ICD-10-CM

## 2020-11-14 LAB
BASOPHILS # BLD AUTO: 0.1 10E9/L (ref 0–0.2)
BASOPHILS NFR BLD AUTO: 1.6 %
DIFFERENTIAL METHOD BLD: ABNORMAL
EOSINOPHIL # BLD AUTO: 0.5 10E9/L (ref 0–0.7)
EOSINOPHIL NFR BLD AUTO: 5.5 %
ERYTHROCYTE [DISTWIDTH] IN BLOOD BY AUTOMATED COUNT: 19.1 % (ref 10–15)
HCT VFR BLD AUTO: 34.7 % (ref 40–53)
HGB BLD-MCNC: 11.2 G/DL (ref 13.3–17.7)
IMM GRANULOCYTES # BLD: 0 10E9/L (ref 0–0.4)
IMM GRANULOCYTES NFR BLD: 0.3 %
LYMPHOCYTES # BLD AUTO: 2.1 10E9/L (ref 0.8–5.3)
LYMPHOCYTES NFR BLD AUTO: 23.5 %
MCH RBC QN AUTO: 27.1 PG (ref 26.5–33)
MCHC RBC AUTO-ENTMCNC: 32.3 G/DL (ref 31.5–36.5)
MCV RBC AUTO: 84 FL (ref 78–100)
MONOCYTES # BLD AUTO: 1 10E9/L (ref 0–1.3)
MONOCYTES NFR BLD AUTO: 11.3 %
NEUTROPHILS # BLD AUTO: 5.1 10E9/L (ref 1.6–8.3)
NEUTROPHILS NFR BLD AUTO: 57.8 %
NRBC # BLD AUTO: 0 10*3/UL
NRBC BLD AUTO-RTO: 0 /100
PLATELET # BLD AUTO: 453 10E9/L (ref 150–450)
RBC # BLD AUTO: 4.14 10E12/L (ref 4.4–5.9)
RETICS # AUTO: 188 10E9/L (ref 25–95)
RETICS/RBC NFR AUTO: 4.5 % (ref 0.5–2)
WBC # BLD AUTO: 8.8 10E9/L (ref 4–11)

## 2020-11-14 PROCEDURE — 86901 BLOOD TYPING SEROLOGIC RH(D): CPT | Mod: 90 | Performed by: PATHOLOGY

## 2020-11-14 PROCEDURE — 86900 BLOOD TYPING SEROLOGIC ABO: CPT | Mod: 90 | Performed by: PATHOLOGY

## 2020-11-14 PROCEDURE — 83021 HEMOGLOBIN CHROMOTOGRAPHY: CPT | Mod: 90 | Performed by: PATHOLOGY

## 2020-11-14 PROCEDURE — 85045 AUTOMATED RETICULOCYTE COUNT: CPT | Performed by: PATHOLOGY

## 2020-11-14 PROCEDURE — 86850 RBC ANTIBODY SCREEN: CPT | Mod: 90 | Performed by: PATHOLOGY

## 2020-11-14 PROCEDURE — 99000 SPECIMEN HANDLING OFFICE-LAB: CPT | Performed by: PATHOLOGY

## 2020-11-14 PROCEDURE — 36415 COLL VENOUS BLD VENIPUNCTURE: CPT | Performed by: PATHOLOGY

## 2020-11-14 PROCEDURE — 86902 BLOOD TYPE ANTIGEN DONOR EA: CPT | Mod: 90 | Performed by: PATHOLOGY

## 2020-11-14 PROCEDURE — 86923 COMPATIBILITY TEST ELECTRIC: CPT | Mod: 90 | Performed by: PATHOLOGY

## 2020-11-14 PROCEDURE — 85025 COMPLETE CBC W/AUTO DIFF WBC: CPT | Performed by: PATHOLOGY

## 2020-11-16 LAB — LAB SCANNED RESULT: ABNORMAL

## 2020-11-17 ENCOUNTER — HOSPITAL ENCOUNTER (OUTPATIENT)
Dept: LAB | Facility: CLINIC | Age: 25
Discharge: HOME | End: 2020-11-17
Attending: PEDIATRICS | Admitting: PEDIATRICS
Payer: COMMERCIAL

## 2020-11-17 VITALS
BODY MASS INDEX: 31.96 KG/M2 | SYSTOLIC BLOOD PRESSURE: 116 MMHG | HEART RATE: 88 BPM | DIASTOLIC BLOOD PRESSURE: 69 MMHG | WEIGHT: 241.18 LBS | RESPIRATION RATE: 16 BRPM | TEMPERATURE: 98.3 F | HEIGHT: 73 IN

## 2020-11-17 LAB
ABO + RH BLD: NORMAL
ABO + RH BLD: NORMAL
BLD GP AB SCN SERPL QL: NORMAL
BLD PROD TYP BPU: NORMAL
BLD UNIT ID BPU: 0
BLOOD BANK CMNT PATIENT-IMP: NORMAL
BLOOD PRODUCT CODE: NORMAL
BPU ID: NORMAL
HCT VFR BLD AUTO: 32.1 % (ref 40–53)
HCT VFR BLD AUTO: 32.7 % (ref 40–53)
HGB BLD-MCNC: 10.5 G/DL (ref 13.3–17.7)
HGB BLD-MCNC: 10.7 G/DL (ref 13.3–17.7)
NUM BPU REQUESTED: 10
SPECIMEN EXP DATE BLD: NORMAL
TRANSFUSION STATUS PATIENT QL: NORMAL

## 2020-11-17 PROCEDURE — 85018 HEMOGLOBIN: CPT | Mod: 91

## 2020-11-17 PROCEDURE — 250N000009 HC RX 250

## 2020-11-17 PROCEDURE — 999N000127 HC STATISTIC PERIPHERAL IV START W US GUIDANCE

## 2020-11-17 PROCEDURE — 36512 APHERESIS RBC: CPT | Performed by: PATHOLOGY

## 2020-11-17 PROCEDURE — 83021 HEMOGLOBIN CHROMOTOGRAPHY: CPT | Mod: 91

## 2020-11-17 PROCEDURE — 250N000013 HC RX MED GY IP 250 OP 250 PS 637

## 2020-11-17 PROCEDURE — 85014 HEMATOCRIT: CPT

## 2020-11-17 PROCEDURE — 36512 APHERESIS RBC: CPT

## 2020-11-17 RX ORDER — HYDROXYZINE HYDROCHLORIDE 50 MG/1
50 TABLET, FILM COATED ORAL
Status: COMPLETED | OUTPATIENT
Start: 2020-11-17 | End: 2020-11-17

## 2020-11-17 RX ADMIN — HYDROXYZINE HYDROCHLORIDE 50 MG: 50 TABLET, FILM COATED ORAL at 09:09

## 2020-11-17 RX ADMIN — ANTICOAGULANT CITRATE DEXTROSE SOLUTION FORMULA A 572 ML: 12.25; 11; 3.65 SOLUTION INTRAVENOUS at 11:12

## 2020-11-17 ASSESSMENT — MIFFLIN-ST. JEOR: SCORE: 2137.75

## 2020-11-17 NOTE — PROCEDURES
Transfusion Medicine Procedure    Ricky Pak [id number]   YOB: 1995 Age: 24 year old        Procedure: Red Cell Exchange           Assessment and Plan:   Ricky is a 24 year old male who presents for prophylactic red cell exchange by apheresis. The frequency will be tentatively planned for once every four weeks. In the past he has used his peripheral veins, and they appear adequate.     The patient tolerated today's exchange.  He was given 50 mg Atarax as premedication as he has a history of reactions and is allergic to benadryl.  The patient slept deeply throughout the procedure; we will consider reducing the dose for the next RBC exchange.           History of Present Illness:   Ricky is a pleasant and articulate 24 year old man who presents for consultation for  red cell exchange apheresis. His past medical history is significant for sickle cell disease. As a child he suffered cyclical pain crisis, culminating in a stroke with temporary loss of vision at age 11, at which time he began treatment with transfusion/exchange.    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 As his job carries him across the , he has been treated in multiple academic centers, the latest being [facility], where he was treated with automated exchange transfusion, with exchanges every four weeks. He states the exchanges do an excellent job of controlling his symptoms, and that prior exchanges utilized his peripheral veins. We discussed ports, but he understandably has no real interest in a port at this time. As he appears to have his disease well controlled he would likely be an excellent candidate for depletion type exchanges. We talked about the procedure and variations at some length. The risks/benefits were discussed with Ricky and all of his questions were addressed at this time.                 Past  "Medical History:   Sickle cell disease  Obstructive sleep apnea          Past Surgical History:   Cholecystectomy           Social History:   Works for [employer]'          Allergies:     Allergies   Allergen Reactions     Diphenhydramine Hives             Medications:     Current Outpatient Medications   Medication Sig     acetaminophen-codeine (TYLENOL W/CODEINE #3) 300-30 MG per tablet Take 1-2 tablets by mouth as needed     HYDROcodone-acetaminophen (NORCO) 5-325 MG tablet Take 1-2 tablets by mouth as needed     No current facility-administered medications for this encounter.              Review of Systems:     CONSTITUTIONAL: NEGATIVE for fever, chills, change in weight  RESP: NEGATIVE for significant cough or SOB  CV: NEGATIVE for chest pain, palpitations or peripheral edema  MUSCULOSKELETAL: NEGATIVE for arthralgias or back pain  NEURO: NEGATIVE            Vital Signs:   /69   Pulse 88   Temp 98.3  F (36.8  C) (Oral)   Resp 16   Ht 1.854 m (6' 0.99\")   Wt 109.4 kg (241 lb 2.9 oz)   BMI 31.83 kg/m         ATTESTATION STATEMENT:   During the procedure this patient was directly seen and evaluated by me , Sofi Ochoa MD, PhD.      Sofi Ochoa MD, PhD  Transfusion Medicine Attending  Medical Director, Blood Bank Laboratory  Pager [phone]          "

## 2020-11-17 NOTE — DISCHARGE INSTRUCTIONS
Red blood cell exchange:   If you received blood products (plasma or red blood cells) as part of your treatment, you need to be aware that transfusion reactions can occur up to several hours after they have been given to you.  Call your physician if you experience any symptoms in the next 48 hours, including breathing problems, rash, itching, hives, nausea or vomiting, fever or chills, blood in your urine or stools, or joint pain.  Please inform the Transfusion Medicine Physician by calling [phone] and asking for the physician on call.Apheresis Blood Donor Center Post Instructions  You may feel tired after your procedure today.   Please call your doctor if you have:  bleeding that doesn t stop, fever, pain where a needle or tube (catheter) was placed, seizures, trouble breathing, red urine, nausea or vomiting, other health concerns.     If your symptoms are severe, call [phone].  If your veins were used, keep the bandages on for 2-4 hours.  Avoid heavy lifting with your arms.  If bleeding occurs from these sites, apply firm pressure for 5-10 minutes.  Call your physician if bleeding continues.    .    .  The Apheresis/Blood Donor Center is open Monday-Friday 7:30 a.m. to 5 p.m.  The phone number is [phone].  A Transfusion Medicine physician can be reached after 5:00 p.m. weekdays and on weekends /Holidays by calling [phone], and asking for the physician on call.

## 2020-11-18 LAB
LAB SCANNED RESULT: ABNORMAL
LAB SCANNED RESULT: ABNORMAL

## 2020-12-15 DIAGNOSIS — D57.1 SICKLE CELL DISEASE WITHOUT CRISIS (H): ICD-10-CM

## 2020-12-15 LAB
BASOPHILS # BLD AUTO: 0 10E9/L (ref 0–0.2)
BASOPHILS NFR BLD AUTO: 0 %
DIFFERENTIAL METHOD BLD: ABNORMAL
EOSINOPHIL # BLD AUTO: 1 10E9/L (ref 0–0.7)
EOSINOPHIL NFR BLD AUTO: 11.1 %
ERYTHROCYTE [DISTWIDTH] IN BLOOD BY AUTOMATED COUNT: 21.1 % (ref 10–15)
HCT VFR BLD AUTO: 35.3 % (ref 40–53)
HGB BLD-MCNC: 11.2 G/DL (ref 13.3–17.7)
IMM GRANULOCYTES # BLD: 0 10E9/L (ref 0–0.4)
IMM GRANULOCYTES NFR BLD: 0 %
LYMPHOCYTES # BLD AUTO: 2 10E9/L (ref 0.8–5.3)
LYMPHOCYTES NFR BLD AUTO: 17.6 %
MCH RBC QN AUTO: 26.2 PG (ref 26.5–33)
MCHC RBC AUTO-ENTMCNC: 31.7 G/DL (ref 31.5–36.5)
MCV RBC AUTO: 83 FL (ref 78–100)
MONOCYTES # BLD AUTO: 1 10E9/L (ref 0–1.3)
MONOCYTES NFR BLD AUTO: 8.8 %
NEUTROPHILS # BLD AUTO: 7 10E9/L (ref 1.6–8.3)
NEUTROPHILS NFR BLD AUTO: 62.5 %
NRBC # BLD AUTO: 0 10*3/UL
NRBC BLD AUTO-RTO: 1 /100
PLATELET # BLD AUTO: 603 10E9/L (ref 150–450)
RBC # BLD AUTO: 4.27 10E12/L (ref 4.4–5.9)
RETICS # AUTO: 209.5 10E9/L (ref 25–95)
RETICS/RBC NFR AUTO: 5.1 % (ref 0.5–2)
WBC # BLD AUTO: 10.5 10E9/L (ref 4–11)

## 2020-12-15 PROCEDURE — 83021 HEMOGLOBIN CHROMOTOGRAPHY: CPT | Performed by: PEDIATRICS

## 2020-12-15 PROCEDURE — 86900 BLOOD TYPING SEROLOGIC ABO: CPT | Performed by: PEDIATRICS

## 2020-12-15 PROCEDURE — 86901 BLOOD TYPING SEROLOGIC RH(D): CPT | Performed by: PEDIATRICS

## 2020-12-15 PROCEDURE — 85025 COMPLETE CBC W/AUTO DIFF WBC: CPT | Performed by: PEDIATRICS

## 2020-12-15 PROCEDURE — 86850 RBC ANTIBODY SCREEN: CPT | Performed by: PEDIATRICS

## 2020-12-15 PROCEDURE — 86923 COMPATIBILITY TEST ELECTRIC: CPT | Performed by: PEDIATRICS

## 2020-12-15 PROCEDURE — 85045 AUTOMATED RETICULOCYTE COUNT: CPT | Performed by: PEDIATRICS

## 2020-12-15 NOTE — NURSING NOTE
Chief Complaint   Patient presents with     Lab Only     labs drawn with vpt by rn.     Labs drawn with vpt by rn.  Pt tolerated well.     Sheila Ferrer RN

## 2020-12-16 LAB
ABO + RH BLD: NORMAL
ABO + RH BLD: NORMAL
BLD GP AB SCN SERPL QL: NORMAL
BLD PROD TYP BPU: NORMAL
BLOOD BANK CMNT PATIENT-IMP: NORMAL
LAB SCANNED RESULT: ABNORMAL
NUM BPU REQUESTED: 10
SPECIMEN EXP DATE BLD: NORMAL

## 2020-12-16 RX ORDER — HYDROXYZINE HYDROCHLORIDE 25 MG/1
25 TABLET, FILM COATED ORAL ONCE
Status: CANCELLED | OUTPATIENT
Start: 2020-12-17

## 2020-12-16 RX ORDER — HEPARIN SODIUM (PORCINE) LOCK FLUSH IV SOLN 100 UNIT/ML 100 UNIT/ML
3 SOLUTION INTRAVENOUS ONCE
Status: CANCELLED | OUTPATIENT
Start: 2020-12-16 | End: 2020-12-16

## 2020-12-17 ENCOUNTER — HOSPITAL ENCOUNTER (OUTPATIENT)
Dept: LAB | Facility: CLINIC | Age: 25
Discharge: HOME | End: 2020-12-17
Attending: PEDIATRICS | Admitting: PATHOLOGY
Payer: COMMERCIAL

## 2020-12-17 VITALS
WEIGHT: 241.18 LBS | HEIGHT: 73 IN | BODY MASS INDEX: 31.96 KG/M2 | HEART RATE: 87 BPM | DIASTOLIC BLOOD PRESSURE: 77 MMHG | RESPIRATION RATE: 16 BRPM | TEMPERATURE: 98.2 F | SYSTOLIC BLOOD PRESSURE: 141 MMHG

## 2020-12-17 LAB
HCT VFR BLD AUTO: 32.8 % (ref 40–53)
HCT VFR BLD AUTO: 35 % (ref 40–53)
HGB BLD-MCNC: 10.7 G/DL (ref 13.3–17.7)
HGB BLD-MCNC: 11.4 G/DL (ref 13.3–17.7)

## 2020-12-17 PROCEDURE — 85018 HEMOGLOBIN: CPT | Performed by: STUDENT IN AN ORGANIZED HEALTH CARE EDUCATION/TRAINING PROGRAM

## 2020-12-17 PROCEDURE — 999N000127 HC STATISTIC PERIPHERAL IV START W US GUIDANCE

## 2020-12-17 PROCEDURE — 36512 APHERESIS RBC: CPT

## 2020-12-17 PROCEDURE — 83021 HEMOGLOBIN CHROMOTOGRAPHY: CPT | Performed by: STUDENT IN AN ORGANIZED HEALTH CARE EDUCATION/TRAINING PROGRAM

## 2020-12-17 PROCEDURE — 99207 PR SERVICE NOT STAFFED W/SUPERV PROV: CPT | Performed by: PATHOLOGY

## 2020-12-17 PROCEDURE — 250N000009 HC RX 250: Performed by: STUDENT IN AN ORGANIZED HEALTH CARE EDUCATION/TRAINING PROGRAM

## 2020-12-17 PROCEDURE — P9016 RBC LEUKOCYTES REDUCED: HCPCS | Performed by: PEDIATRICS

## 2020-12-17 PROCEDURE — 250N000013 HC RX MED GY IP 250 OP 250 PS 637: Performed by: PEDIATRICS

## 2020-12-17 PROCEDURE — 85014 HEMATOCRIT: CPT | Performed by: STUDENT IN AN ORGANIZED HEALTH CARE EDUCATION/TRAINING PROGRAM

## 2020-12-17 RX ORDER — HYDROXYZINE PAMOATE 25 MG/1
25 CAPSULE ORAL ONCE
Status: COMPLETED | OUTPATIENT
Start: 2020-12-17 | End: 2020-12-17

## 2020-12-17 RX ORDER — HYDROXYZINE HYDROCHLORIDE 25 MG/1
25 TABLET, FILM COATED ORAL ONCE
Status: DISCONTINUED | OUTPATIENT
Start: 2020-12-17 | End: 2020-12-17 | Stop reason: RX

## 2020-12-17 RX ADMIN — HYDROXYZINE PAMOATE 25 MG: 25 CAPSULE ORAL at 10:11

## 2020-12-17 RX ADMIN — ANTICOAGULANT CITRATE DEXTROSE SOLUTION FORMULA A 580 ML: 12.25; 11; 3.65 SOLUTION INTRAVENOUS at 10:25

## 2020-12-17 ASSESSMENT — MIFFLIN-ST. JEOR: SCORE: 2137.75

## 2020-12-17 NOTE — DISCHARGE INSTRUCTIONS
Red blood cell exchange:   If you received blood products (plasma or red blood cells) as part of your treatment, you need to be aware that transfusion reactions can occur up to several hours after they have been given to you.  Call your physician if you experience any symptoms in the next 48 hours, including breathing problems, rash, itching, hives, nausea or vomiting, fever or chills, blood in your urine or stools, or joint pain.  Please inform the Transfusion Medicine Physician by calling [phone] and asking for the physician on call.

## 2020-12-18 LAB
BLD PROD TYP BPU: NORMAL
BLD UNIT ID BPU: 0
BLOOD PRODUCT CODE: NORMAL
BPU ID: NORMAL
LAB SCANNED RESULT: ABNORMAL
LAB SCANNED RESULT: ABNORMAL
TRANSFUSION STATUS PATIENT QL: NORMAL

## 2020-12-18 NOTE — PROCEDURES
Transfusion Medicine Procedure    Ricky Pak [id number]   YOB: 1995 Age: 24 year old        Procedure: Red Cell Exchange           Assessment and Plan:   Ricky is a 24 year old male who presents for prophylactic red cell exchange by apheresis. The frequency will be tentatively planned for once every four weeks. In the past he has used his peripheral veins, and they appear adequate.     The patient tolerated today's exchange.  He was given 25 mg Atarax as premedication as he has a history of reactions and is allergic to benadryl.  The patient had a mild cough but was afebrile.         History of Present Illness:   Ricky is a pleasant and articulate 24 year old man who presents for consultation for  red cell exchange apheresis. His past medical history is significant for sickle cell disease. As a child he suffered cyclical pain crisis, culminating in a stroke with temporary loss of vision at age 11, at which time he began treatment with transfusion/exchange.    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 As his job carries him across the , he has been treated in multiple academic centers, the latest being [facility], where he was treated with automated exchange transfusion, with exchanges every four weeks. He states the exchanges do an excellent job of controlling his symptoms, and that prior exchanges utilized his peripheral veins. We discussed ports, but he understandably has no real interest in a port at this time. As he appears to have his disease well controlled he would likely be an excellent candidate for depletion type exchanges. We talked about the procedure and variations at some length. The risks/benefits were discussed with Ricky and all of his questions were addressed at this time.                 Past Medical History:   Sickle cell disease  Obstructive sleep apnea       "    Past Surgical History:   Cholecystectomy           Social History:   Works for [employer]'          Allergies:     Allergies   Allergen Reactions     Diphenhydramine Hives             Medications:     Current Outpatient Medications   Medication Sig     acetaminophen-codeine (TYLENOL W/CODEINE #3) 300-30 MG per tablet Take 1-2 tablets by mouth as needed     HYDROcodone-acetaminophen (NORCO) 5-325 MG tablet Take 1-2 tablets by mouth as needed     No current facility-administered medications for this encounter.              Review of Systems:     CONSTITUTIONAL: NEGATIVE for fever, chills, change in weight  RESP: NEGATIVE for significant cough or SOB  CV: NEGATIVE for chest pain, palpitations or peripheral edema  MUSCULOSKELETAL: NEGATIVE for arthralgias or back pain  NEURO: NEGATIVE            Vital Signs:   BP (!) 141/77   Pulse 87   Temp 98.2  F (36.8  C) (Oral)   Resp 16   Ht 1.854 m (6' 0.99\")   Wt 109.4 kg (241 lb 2.9 oz)   BMI 31.83 kg/m         ATTESTATION STATEMENT:  I, Sofi Ochoa MD, PhD., was available by pager during the entire procedure.  I have reviewed the chart and discussed the patient and current procedure with the nursing staff.        Sofi Ochoa MD, PhD  Transfusion Medicine Attending  Medical Director, Blood Bank Laboratory  Pager [phone]          "

## 2021-01-12 ENCOUNTER — HOSPITAL ENCOUNTER (OUTPATIENT)
Facility: CLINIC | Age: 26
Discharge: HOME | End: 2021-01-12

## 2021-01-12 DIAGNOSIS — D57.1 SICKLE CELL DISEASE WITHOUT CRISIS (H): ICD-10-CM

## 2021-01-12 LAB
BASOPHILS # BLD AUTO: 0.1 10E9/L (ref 0–0.2)
BASOPHILS NFR BLD AUTO: 1.4 %
DIFFERENTIAL METHOD BLD: ABNORMAL
EOSINOPHIL # BLD AUTO: 0.9 10E9/L (ref 0–0.7)
EOSINOPHIL NFR BLD AUTO: 8.5 %
ERYTHROCYTE [DISTWIDTH] IN BLOOD BY AUTOMATED COUNT: 23.6 % (ref 10–15)
HCT VFR BLD AUTO: 33.8 % (ref 40–53)
HGB BLD-MCNC: 10.5 G/DL (ref 13.3–17.7)
IMM GRANULOCYTES # BLD: 0 10E9/L (ref 0–0.4)
IMM GRANULOCYTES NFR BLD: 0.2 %
LYMPHOCYTES # BLD AUTO: 2.3 10E9/L (ref 0.8–5.3)
LYMPHOCYTES NFR BLD AUTO: 22.4 %
MCH RBC QN AUTO: 24.7 PG (ref 26.5–33)
MCHC RBC AUTO-ENTMCNC: 31.1 G/DL (ref 31.5–36.5)
MCV RBC AUTO: 80 FL (ref 78–100)
MONOCYTES # BLD AUTO: 1.1 10E9/L (ref 0–1.3)
MONOCYTES NFR BLD AUTO: 10.8 %
NEUTROPHILS # BLD AUTO: 5.7 10E9/L (ref 1.6–8.3)
NEUTROPHILS NFR BLD AUTO: 56.7 %
NRBC # BLD AUTO: 0.1 10*3/UL
NRBC BLD AUTO-RTO: 1 /100
PLATELET # BLD AUTO: 677 10E9/L (ref 150–450)
RBC # BLD AUTO: 4.25 10E12/L (ref 4.4–5.9)
RETICS # AUTO: 159 10E9/L (ref 25–95)
RETICS/RBC NFR AUTO: 3.7 % (ref 0.5–2)
WBC # BLD AUTO: 10.1 10E9/L (ref 4–11)

## 2021-01-12 PROCEDURE — 86900 BLOOD TYPING SEROLOGIC ABO: CPT | Mod: 90 | Performed by: PATHOLOGY

## 2021-01-12 PROCEDURE — 86850 RBC ANTIBODY SCREEN: CPT | Mod: 90 | Performed by: PATHOLOGY

## 2021-01-12 PROCEDURE — 86923 COMPATIBILITY TEST ELECTRIC: CPT | Mod: 90 | Performed by: PATHOLOGY

## 2021-01-12 PROCEDURE — 86902 BLOOD TYPE ANTIGEN DONOR EA: CPT | Mod: 90 | Performed by: PATHOLOGY

## 2021-01-12 PROCEDURE — 99000 SPECIMEN HANDLING OFFICE-LAB: CPT | Performed by: PATHOLOGY

## 2021-01-12 PROCEDURE — 36415 COLL VENOUS BLD VENIPUNCTURE: CPT | Performed by: PATHOLOGY

## 2021-01-12 PROCEDURE — 86902 BLOOD TYPE ANTIGEN DONOR EA: CPT

## 2021-01-12 PROCEDURE — 85045 AUTOMATED RETICULOCYTE COUNT: CPT | Performed by: PATHOLOGY

## 2021-01-12 PROCEDURE — 86901 BLOOD TYPING SEROLOGIC RH(D): CPT | Mod: 90 | Performed by: PATHOLOGY

## 2021-01-12 PROCEDURE — 83021 HEMOGLOBIN CHROMOTOGRAPHY: CPT | Mod: 90 | Performed by: PATHOLOGY

## 2021-01-12 PROCEDURE — 85025 COMPLETE CBC W/AUTO DIFF WBC: CPT | Performed by: PATHOLOGY

## 2021-01-13 LAB — LAB SCANNED RESULT: ABNORMAL

## 2021-01-14 LAB
ABO + RH BLD: NORMAL
ABO + RH BLD: NORMAL
BLD GP AB SCN SERPL QL: NORMAL
BLD PROD TYP BPU: NORMAL
BLOOD BANK CMNT PATIENT-IMP: NORMAL
NUM BPU REQUESTED: 10
SPECIMEN EXP DATE BLD: NORMAL

## 2021-01-14 RX ORDER — HYDROXYZINE HYDROCHLORIDE 25 MG/1
25 TABLET, FILM COATED ORAL
Status: CANCELLED | OUTPATIENT
Start: 2021-01-14

## 2021-01-15 ENCOUNTER — HOSPITAL ENCOUNTER (OUTPATIENT)
Dept: LAB | Facility: CLINIC | Age: 26
Discharge: HOME | End: 2021-01-15
Attending: PEDIATRICS | Admitting: PATHOLOGY

## 2021-01-15 VITALS
HEIGHT: 73 IN | BODY MASS INDEX: 31.85 KG/M2 | DIASTOLIC BLOOD PRESSURE: 80 MMHG | WEIGHT: 240.3 LBS | RESPIRATION RATE: 18 BRPM | SYSTOLIC BLOOD PRESSURE: 116 MMHG | HEART RATE: 74 BPM | TEMPERATURE: 97.9 F

## 2021-01-15 LAB
BLD PROD TYP BPU: NORMAL
BLD UNIT ID BPU: 0
BLOOD PRODUCT CODE: NORMAL
BPU ID: NORMAL
HCT VFR BLD AUTO: 31.5 % (ref 40–53)
HCT VFR BLD AUTO: 33.3 % (ref 40–53)
HGB BLD-MCNC: 10.2 G/DL (ref 13.3–17.7)
HGB BLD-MCNC: 11.2 G/DL (ref 13.3–17.7)
TRANSFUSION RXN BLOOD BANK NOTIFICATION: NORMAL
TRANSFUSION STATUS PATIENT QL: NORMAL

## 2021-01-15 PROCEDURE — 36512 APHERESIS RBC: CPT

## 2021-01-15 PROCEDURE — 999N000127 HC STATISTIC PERIPHERAL IV START W US GUIDANCE

## 2021-01-15 PROCEDURE — P9016 RBC LEUKOCYTES REDUCED: HCPCS

## 2021-01-15 PROCEDURE — 250N000009 HC RX 250

## 2021-01-15 PROCEDURE — 250N000013 HC RX MED GY IP 250 OP 250 PS 637

## 2021-01-15 PROCEDURE — 85018 HEMOGLOBIN: CPT

## 2021-01-15 PROCEDURE — 83021 HEMOGLOBIN CHROMOTOGRAPHY: CPT

## 2021-01-15 PROCEDURE — 99207 PR NO CHARGE LOS: CPT | Performed by: PATHOLOGY

## 2021-01-15 PROCEDURE — 258N000003 HC RX IP 258 OP 636: Performed by: PATHOLOGY

## 2021-01-15 PROCEDURE — 999N001060 HC STATISTIC BB TRANSF RXN INVEST: Performed by: PEDIATRICS

## 2021-01-15 PROCEDURE — 85014 HEMATOCRIT: CPT

## 2021-01-15 RX ORDER — HYDROXYZINE HYDROCHLORIDE 25 MG/1
25 TABLET, FILM COATED ORAL
Status: COMPLETED | OUTPATIENT
Start: 2021-01-15 | End: 2021-01-15

## 2021-01-15 RX ADMIN — ANTICOAGULANT CITRATE DEXTROSE SOLUTION FORMULA A: 12.25; 11; 3.65 SOLUTION INTRAVENOUS at 13:30

## 2021-01-15 RX ADMIN — HYDROXYZINE HYDROCHLORIDE 25 MG: 25 TABLET, FILM COATED ORAL at 13:12

## 2021-01-15 RX ADMIN — SODIUM CHLORIDE 600 ML: 9 INJECTION, SOLUTION INTRAVENOUS at 15:20

## 2021-01-15 ASSESSMENT — MIFFLIN-ST. JEOR: SCORE: 2131.26

## 2021-01-15 NOTE — PROGRESS NOTES
S: Routine/scheduled Depletion/Red Blood Cell Exchange for management of sickle cell disease.  B: Hypotensive episode end of treatment after last red blood cell unit complete. BP 69/42, complained of being warm, noted restless, rolling over to right side and back, wanting to bend right access arm w/[id number] needle still in place, redirected patient twice to keep arm straight, awake, responds appropriately, stated slightly lightheaded, denied nausea.  A: Secured access arm, removed blankets, started normal saline gravity gtt from Optia machine, lowered HOB, collected post labs, continued to be restless, called for help from apheresis staff, lowered HOB further, placed ice packs behind neck, recheck BP 89/40 after 100 mL NS bolus. Patient reports feeling better. De-accessed AVF needle from right upper arm, held until hemostasis, secured w/gauze/coban. DC'd return line from Optia machine and continued a 500 mL Normal Saline infusion on Trejo pump over 40 minutes.   Blood pressures continued to improve, 115/71 (77) 10 minutes after initial hypotensive episode. Dr. Ott arrived at bedside and spoke w/patient, pt stated feeling much better, rechecked vitals semi-fowlers and seated before escorting to restroom w/IV pole, ambulated well, denied lightheadedness or other symptoms. Ambulated back to room to complete NS infusion. Rechecked BP after ambulating and after NS infusion complete, vital signs continued to be stable (see Apheresis document flow sheet for data). Return line IV de-accessed from left lower forearm, held site until hemostasis, secured w/gauze and coban.  Patient left ambulatory, writer escorted patient to the lobby, pt reports feeling really good. Note 16oz of water PO over 50 minutes as well.  R: Discharge instructions and symptoms of transfusion reactions reviewed with patient, states yes, he knows to call with any post transfusion symptoms/reactions.

## 2021-01-15 NOTE — PROCEDURES
Transfusion Medicine Procedure    Ricky Pak [id number]   YOB: 1995 Age: 24 year old        Procedure: Red Cell Exchange           Assessment and Plan:   Ricky is a 24 year old male who presents for prophylactic red cell exchange by apheresis. The frequency will be tentatively planned for once every four weeks. In the past he has used his peripheral veins, and they appear adequate.     The patient tolerated today's exchange.  He was given 25 mg Atarax as premedication as he has a history of reactions and is allergic to benadryl.  At the end of the exchange he became uncomfortable, felt overly warm and dizzy.  His BP at that time was 69/42.  We gave an initial saline bolus of 100 ml and his BP improved to 89/40.  We continued to give him 500 ml total saline and he felt fine.  Normotensive and able to walk around the room comfortably.         History of Present Illness:   Ricky is a pleasant and articulate 24 year old man who presents for consultation for  red cell exchange apheresis. His past medical history is significant for sickle cell disease. As a child he suffered cyclical pain crisis, culminating in a stroke with temporary loss of vision at age 11, at which time he began treatment with transfusion/exchange.    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 As his job carries him across the , he has been treated in multiple academic centers, the latest being [facility], where he was treated with automated exchange transfusion, with exchanges every four weeks. He states the exchanges do an excellent job of controlling his symptoms, and that prior exchanges utilized his peripheral veins. We discussed ports, but he understandably has no real interest in a port at this time. As he appears to have his disease well controlled he would likely be an excellent candidate for depletion type  "exchanges. We talked about the procedure and variations at some length. The risks/benefits were discussed with Ricky and all of his questions were addressed at this time.                 Past Medical History:   Sickle cell disease  Obstructive sleep apnea          Past Surgical History:   Cholecystectomy           Social History:   Works for '[employer]'          Allergies:     Allergies   Allergen Reactions     Diphenhydramine Hives             Medications:     Current Outpatient Medications   Medication Sig     acetaminophen-codeine (TYLENOL W/CODEINE #3) 300-30 MG per tablet Take 1-2 tablets by mouth as needed     HYDROcodone-acetaminophen (NORCO) 5-325 MG tablet Take 1-2 tablets by mouth as needed     No current facility-administered medications for this encounter.              Review of Systems:     CONSTITUTIONAL: NEGATIVE for fever, chills, change in weight  RESP: NEGATIVE for significant cough or SOB  CV: NEGATIVE for chest pain, palpitations or peripheral edema  MUSCULOSKELETAL: NEGATIVE for arthralgias or back pain  NEURO: NEGATIVE            Vital Signs:   /71   Pulse 78   Temp 98  F (36.7  C) (Oral)   Resp 20   Ht 1.85 m (6' 0.84\")   Wt 109 kg (240 lb 4.8 oz)   BMI 31.85 kg/m         ATTESTATION STATEMENT:  I, Sofi Ochoa MD, PhD., was available by pager during the entire procedure.  I have reviewed the chart and discussed the patient and current procedure with the nursing staff.        Sofi Ochoa MD, PhD  Transfusion Medicine Attending  Medical Director, Blood Bank Laboratory  Pager [phone]          "

## 2021-01-15 NOTE — DISCHARGE INSTRUCTIONS
Apheresis Blood Donor Center Post Instructions  You may feel tired after your procedure today.   Please call your doctor if you have:  bleeding that doesn t stop, fever, pain where a needle or tube (catheter) was placed, seizures, trouble breathing, red urine, nausea or vomiting, other health concerns.     If your symptoms are severe, call [phone].  Your veins were used, keep the bandages on for 2-4 hours.  Avoid heavy lifting with your arms.  If bleeding occurs from these sites, apply firm pressure for 5-10 minutes.  Call your physician if bleeding continues.    The Apheresis/Blood Donor Center is open Monday-Friday 7:30 a.m. to 5 p.m.  The phone number is [phone].  A Transfusion Medicine physician can be reached after 5:00 p.m. weekdays and on weekends /Holidays by calling [phone], and asking for the physician on call.     Red blood cell exchange:   You received blood products (red blood cells) as part of your treatment, you need to be aware that transfusion reactions can occur up to several hours after they have been given to you.  Call your physician if you experience any symptoms in the next 48 hours, including breathing problems, rash, itching, hives, nausea or vomiting, fever or chills, blood in your urine or stools, or joint pain.  Please inform the Transfusion Medicine Physician by calling [phone] and asking for the physician on call.

## 2021-01-18 NOTE — CONSULTS
Laboratory Medicine and Pathology  Transfusion Medicine- Transfusion Reaction    Ricky Pak MRN# [id number]   YOB: 1995 Age: 25 year old   Date of Reaction: 1/15/2021       Transfusion Reaction Evaluation   Impression  - Hypotensive transfusion reaction    Recommendation    1. Transfuse as needed.    2. No evidence of immune-mediated hemolysis   ----------------------------------    History  Ricky Pakis a 25 year old male with a past medical history of SCD who has been seen at the [facility] for regularly scheduled depletion-exchanges.    On 1/15/2021, as he was disconnecting from his final exchange after receiving 10 units of PRBCs, he experienced a hypotensive event where his BP dropped from 124/48 pre-transfusion to 69/42. I was present immediately following this episode, and on questioning he was slightly lightheaded but otherwise denied mental status changes, flank pain, chest pain, shortness of breath, or any other specific symptoms. He was given 500 mL of normal saline over the next hour with recovery of his BP to 116/80 at time of discharge. Please see the progress note by Ale Arrieta and the procedure note by Sofi Ochoa MD for more information.    Reported Symptoms  Hyoptension    Vitals    Pre-transfusion vital signs (taken at 1230 on 1/15/2021)  Blood pressure 124/59 mm Hg   Pulse 66 bpm   Temperature 36.7 C   Respiratory Rate 18 per minute   O2 Saturation N/A     Vitals signs during suspected reaction (taken at 1520 on 1/15/2021)  Blood pressure 69/42 mm Hg   Pulse 77 bpm   Temperature 36.7 C   Respiratory Rate N/A   O2 Saturation N/A       Blood Bank Investigation  Product Type: PRBCs  Unit Number: [id number]; [id number]; [id number]; [id number]; [id number]; [id number]; [id number]; [id number]; [id number]; [id number]  Amount Remainin mL for all  Post-Transfusion Clerical Check: Correct for all  ABO/Rh: All of the unit types were O+ and the  patient was O+. The units were compatible.  KATIANA: Negatve  Post-Transfusion Plasma: Straw colored    [facility] Hemovigilance  Case Definition: Definitive  Severity: Non-severe  Imputability: Definite    Manny Ott MD  1/18/2021 3:23 PM  Transfusion Medicine Fellow  Pager: [phone]        I, Maikel Del Toro MD, have reviewed the patient's records and data. I have discussed this case with the transfusion medicine fellow, Manny Ott MD.  The findings and recommendations documented in the note reflect my medical assessment of the reported transfusion reaction.   This meets the definition of a non-severe, hypotensive transfusion reaction of definite imputability. No testing is pending at this time and this report is final.   Recommendation: Transfuse as needed.    Maikel Del Toro MD  Transfusion Medicine Attending  Laboratory Medicine & Pathology  Pager: [phone]

## 2021-01-20 LAB
LAB SCANNED RESULT: ABNORMAL
LAB SCANNED RESULT: ABNORMAL

## 2021-03-07 ENCOUNTER — HEALTH MAINTENANCE LETTER (OUTPATIENT)
Age: 26
End: 2021-03-07

## 2021-03-12 LAB — TRANSF REACT PLASRBC-IMP: NORMAL

## 2021-10-11 ENCOUNTER — HEALTH MAINTENANCE LETTER (OUTPATIENT)
Age: 26
End: 2021-10-11

## 2021-11-22 ENCOUNTER — MYC MEDICAL ADVICE (OUTPATIENT)
Dept: ONCOLOGY | Facility: CLINIC | Age: 26
End: 2021-11-22
Payer: COMMERCIAL

## 2021-11-22 DIAGNOSIS — D57.1 HB-SS DISEASE WITHOUT CRISIS (H): Primary | ICD-10-CM

## 2021-11-22 DIAGNOSIS — Z86.73 HISTORY OF CVA (CEREBROVASCULAR ACCIDENT): ICD-10-CM

## 2021-12-15 ENCOUNTER — LAB (OUTPATIENT)
Dept: LAB | Facility: CLINIC | Age: 26
End: 2021-12-15
Payer: COMMERCIAL

## 2021-12-15 ENCOUNTER — HOSPITAL ENCOUNTER (OUTPATIENT)
Dept: LAB | Facility: CLINIC | Age: 26
End: 2021-12-15
Attending: PEDIATRICS
Payer: COMMERCIAL

## 2021-12-15 VITALS
HEIGHT: 72 IN | DIASTOLIC BLOOD PRESSURE: 87 MMHG | WEIGHT: 236.33 LBS | HEART RATE: 78 BPM | BODY MASS INDEX: 32.01 KG/M2 | RESPIRATION RATE: 18 BRPM | TEMPERATURE: 98.3 F | SYSTOLIC BLOOD PRESSURE: 134 MMHG

## 2021-12-15 DIAGNOSIS — D57.1 SICKLE CELL DISEASE WITHOUT CRISIS (H): ICD-10-CM

## 2021-12-15 LAB
ABO/RH(D): NORMAL
ANTIBODY SCREEN: NEGATIVE
BASOPHILS # BLD AUTO: 0.1 10E3/UL (ref 0–0.2)
BASOPHILS NFR BLD AUTO: 1 %
EOSINOPHIL # BLD AUTO: 0.4 10E3/UL (ref 0–0.7)
EOSINOPHIL NFR BLD AUTO: 4 %
ERYTHROCYTE [DISTWIDTH] IN BLOOD BY AUTOMATED COUNT: 16 % (ref 10–15)
HCT VFR BLD AUTO: 37.1 % (ref 40–53)
HGB BLD-MCNC: 12.3 G/DL (ref 13.3–17.7)
IMM GRANULOCYTES # BLD: 0.1 10E3/UL
IMM GRANULOCYTES NFR BLD: 1 %
LYMPHOCYTES # BLD AUTO: 2.9 10E3/UL (ref 0.8–5.3)
LYMPHOCYTES NFR BLD AUTO: 27 %
MCH RBC QN AUTO: 29.2 PG (ref 26.5–33)
MCHC RBC AUTO-ENTMCNC: 33.2 G/DL (ref 31.5–36.5)
MCV RBC AUTO: 88 FL (ref 78–100)
MONOCYTES # BLD AUTO: 1.1 10E3/UL (ref 0–1.3)
MONOCYTES NFR BLD AUTO: 11 %
NEUTROPHILS # BLD AUTO: 6.2 10E3/UL (ref 1.6–8.3)
NEUTROPHILS NFR BLD AUTO: 56 %
NRBC # BLD AUTO: 0 10E3/UL
NRBC BLD AUTO-RTO: 0 /100
PLATELET # BLD AUTO: 423 10E3/UL (ref 150–450)
RBC # BLD AUTO: 4.21 10E6/UL (ref 4.4–5.9)
RETICS # AUTO: 0.22 10E6/UL (ref 0.03–0.1)
RETICS/RBC NFR AUTO: 5.8 % (ref 0.5–2)
SPECIMEN EXPIRATION DATE: NORMAL
WBC # BLD AUTO: 10.8 10E3/UL (ref 4–11)

## 2021-12-15 PROCEDURE — 85045 AUTOMATED RETICULOCYTE COUNT: CPT

## 2021-12-15 PROCEDURE — 86923 COMPATIBILITY TEST ELECTRIC: CPT

## 2021-12-15 PROCEDURE — G0463 HOSPITAL OUTPT CLINIC VISIT: HCPCS

## 2021-12-15 PROCEDURE — 86901 BLOOD TYPING SEROLOGIC RH(D): CPT

## 2021-12-15 PROCEDURE — 83021 HEMOGLOBIN CHROMOTOGRAPHY: CPT

## 2021-12-15 PROCEDURE — 36415 COLL VENOUS BLD VENIPUNCTURE: CPT

## 2021-12-15 PROCEDURE — 85025 COMPLETE CBC W/AUTO DIFF WBC: CPT

## 2021-12-15 ASSESSMENT — MIFFLIN-ST. JEOR: SCORE: 2089.51

## 2021-12-15 NOTE — PROGRESS NOTES
"APHERESIS INITIAL CONSULT CHECKLIST    Current Encounter Information  Current Encounter Information: Reason for Visit, Allergies and Current Meds  Procedure Requested: Red Blood Cell Exchange  History of: (Reason for Apheresis): Sickle Cell    Access Assessment  Access Assessment  Vein Assessment:  Veins are adequate: Yes    Vital Signs  Vital Signs  BP: 134/87  Pulse: 78  Temp: 98.3  F (36.8  C)  Temp src: Oral  Resp: 18  Height: 182 cm (5' 11.65\")  Weight: 107.2 kg (236 lb 5.3 oz)    Reviewed   Review With Patient  Have you read the brochure Getting ready for Apheresis?: Yes  Have you had any invasive procedures, surgery, biopsy, bleeding in the last month?: No  Review medications and allergies: Yes  Have you ever been transfused?: Yes  Do you require pre-medication for blood products?: Yes (Benadryl allergy, needs Atarax)  Patient given tour of the unit: Yes  Photophoresis: sun precautions reviewed with patient: N/A    Additional Information  Notes, needs and time spent with patient  Explain procedure, side effects or reactions, instructions: Yes  Patient has special need?: No  Time spent: 30 min face to face reviewing medical history, medications, red blood cell exchange (RBCX) procedure, side effects, and assessing veins for access for peripheral RBCX. Note patient has an allergy to Benadryl, uses Hydroxyzine (Atarax) as pre-med. Both arms adequate for peripheral access, right arm first choice today (LM/CM). Encouraged/reminded patient to hydrate well days leading up to RBCX.        "

## 2021-12-15 NOTE — NURSING NOTE
Chief Complaint   Patient presents with     Labs Only     Labs drawn by  in lab by RN.     Eliseo Rosario RN

## 2021-12-15 NOTE — CONSULTS
Transfusion Medicine Consultation    Ricky Pak [id number]   YOB: 1995 Age: 26-year-old   Date of Consult: 12/15/2021  Reason for consult: Red blood cell exchange           Assessment and Plan:     26-year-old male presents for consultation for red cell exchange by apheresis. We plan to perform these exchanges once every 4 to 8 weeks. Given his previous hypotensive episode with depletion exchange, we plan to perform an exchange alone. He has used peripheral veins in the past, therefore, we plan to use his veins for these procedures.         Chief Complaint:     Transfusion medicine consultation.         History of Present Illness:     Ricky is a pleasant 26-year-old male who presents for consultation for red blood cell exchange. 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 He began apheresis exchanges around 2013 following his issues with iron overload.  Per previous documentation, these exchanges have done an excellent job preventing sickle cell crises. He had a hypotensive episode following his last apheresis procedure which was a depletion followed by an exchange. Therefore, at this time we plan to proceed with an exchange alone. He is currently well. He reported that he received both Pfizer shots along with a booster. Additionally, he reported that he had a recent flu vaccination. The procedure, risks/benefits were discussed with the patient and all of his questions were addressed at this time.             Past Medical History:     Past Medical History:   Diagnosis Date     Gallstones 2011    s/p cholecystectomy     Hb-SS disease without crisis (H)      History of CVA (cerebrovascular accident) 07/2006    macular infarct, but regained vision     Iron overload due to repeated red blood cell transfusions 2013    s/p chelation     Sleep apnea   "   Treated with CPAP.     The above diagnoses were reviewed with Mr. Pak and are accurate.         Past Surgical History:     Past Surgical History:   Procedure Laterality Date     CHOLECYSTECTOMY N/A 2011     LIVER BIOPSY N/A 2007    iron overload monitoring     LIVER BIOPSY N/A 2008    monitoring for iron overload     The above procedures were reviewed with Mr. Pak and are accurate.         Social History:     Social History     Tobacco Use     Smoking status: Never Smoker     Smokeless tobacco: Never Used   Substance Use Topics     Alcohol use: Yes     Alcohol/week: 2.0 standard drinks     Types: 2 Standard drinks or equivalent per week     Comment: \"A few beers every other weekend.\"     Drug use: Never   Denied ever getting a tattoo    The above was reviewed with Mr. Pak and is accurate.         Family History:     Family History   Problem Relation Age of Onset     Sickle Cell Trait Mother      Sickle Cell Trait Father      No Known Problems Sister      No Known Problems Sister      Sickle Cell Trait Brother      Breast Cancer Maternal Grandmother      Breast Cancer Maternal Great-Grandmother      The above was reviewed with Mr. Pak and is accurate         Immunizations:     Most Recent Immunizations   Administered Date(s) Administered     COVID-19,PF,Pfizer (12+ Yrs) 04/02/2021, 04/23/2021, 09/10/2021   Flu vaccine 09/28/2021.         Allergies:     Allergies   Allergen Reactions     Diphenhydramine Hives   Freshly cut grass causes watery eyes and a runny nose.         Medications:     Current Outpatient Medications   Medication     acetaminophen-codeine (TYLENOL W/CODEINE #3) 300-30 MG per tablet     HYDROcodone-acetaminophen (NORCO) 5-325 MG tablet     While he is prescribed the above medications, he does not currently have any and stated that he does not take them regularly as he does not routinely have sickle cell crises.         Review of Systems:     Denied fevers, denied chills.  Denied vision " "changes.  Denied hearing changes.  Denied rhinorrhea.  Denied sore throat.  Denied chest pain, denied palpitations.  Denied shortness of breath, denied cough, denied wheeze.  Denied abdominal pain, reported nausea and anorexia while in [place] (both have resolved), denied vomiting.  Denied nocturia, denied urinary hesitancy.  Denied joint pain, denied joint soreness, denied muscle pain.  Denied paraesthesias, denied ever having a seizure.          Vital Signs:     12/15/2021 at 1400: /87   Pulse 78   Temp 98.3  F (36.8  C) (Oral)   Resp 18   Ht 1.82 m (5' 11.65\")   Wt 107.2 kg (236 lb 5.3 oz)   BMI 32.36 kg/m           Data:     Results for orders placed or performed in visit on 12/15/21   *CBC with platelets differential     Status: Abnormal    Narrative    The following orders were created for panel order *CBC with platelets differential.  Procedure                               Abnormality         Status                     ---------                               -----------         ------                     CBC with platelets and d...[[id number]]  Abnormal            Final result                 Please view results for these tests on the individual orders.   Reticulocyte count     Status: Abnormal   Result Value Ref Range    % Reticulocyte 5.8 (H) 0.5 - 2.0 %    Absolute Reticulocyte 0.220 (H) 0.025 - 0.095 10e6/uL   CBC with platelets and differential     Status: Abnormal   Result Value Ref Range    WBC Count 10.8 4.0 - 11.0 10e3/uL    RBC Count 4.21 (L) 4.40 - 5.90 10e6/uL    Hemoglobin 12.3 (L) 13.3 - 17.7 g/dL    Hematocrit 37.1 (L) 40.0 - 53.0 %    MCV 88 78 - 100 fL    MCH 29.2 26.5 - 33.0 pg    MCHC 33.2 31.5 - 36.5 g/dL    RDW 16.0 (H) 10.0 - 15.0 %    Platelet Count 423 150 - 450 10e3/uL    % Neutrophils 56 %    % Lymphocytes 27 %    % Monocytes 11 %    % Eosinophils 4 %    % Basophils 1 %    % Immature Granulocytes 1 %    NRBCs per 100 WBC 0 <1 /100    Absolute Neutrophils 6.2 1.6 - 8.3 " 10e3/uL    Absolute Lymphocytes 2.9 0.8 - 5.3 10e3/uL    Absolute Monocytes 1.1 0.0 - 1.3 10e3/uL    Absolute Eosinophils 0.4 0.0 - 0.7 10e3/uL    Absolute Basophils 0.1 0.0 - 0.2 10e3/uL    Absolute Immature Granulocytes 0.1 <=0.4 10e3/uL    Absolute NRBCs 0.0 10e3/uL   ABO/Rh type and screen     Status: None    Narrative    The following orders were created for panel order ABO/Rh type and screen.  Procedure                               Abnormality         Status                     ---------                               -----------         ------                     Adult Type and Screen[[id number]]                            Final result                 Please view results for these tests on the individual orders.   Adult Type and Screen     Status: None   Result Value Ref Range    ABO/RH(D) O POS     Antibody Screen Negative Negative    SPECIMEN EXPIRATION DATE [id number]              Cy Lorenzo MD  [phone]        Attestation:  I, Maikel Del Toro MD, saw and evaluated this patient during their visit with the pathology resident, Fran Lorenzo. I have reviewed the patient's records and data.  This includes all of the above laboratory results(CBC and diff, retic, ABO type and screen) and prior notes including mutilple RBCX procedure notes from our group last year, and hematology note from Dr Denson from last November.  I agree with the resident's  findings and plan of care as documented in their note. The patient was alert, with no apparent distress, breathing appeared comfortable on room air.   Proceed with series of red blood cell exchanges.  35 minutes spent on the date of the encounter doing chart review, history and exam, documentation and review of test results.        Maikel Del Toro MD  Transfusion Medicine Attending  Laboratory Medicine and Pathology  Pager [phone]

## 2021-12-16 LAB
BLD PROD TYP BPU: NORMAL
BLOOD COMPONENT TYPE: NORMAL
CODING SYSTEM: NORMAL
CROSSMATCH: NORMAL
ISSUE DATE AND TIME: NORMAL
UNIT ABO/RH: NORMAL
UNIT NUMBER: NORMAL
UNIT STATUS: NORMAL
UNIT TYPE ISBT: 9500

## 2021-12-16 RX ORDER — HYDROXYZINE HYDROCHLORIDE 25 MG/1
25 TABLET, FILM COATED ORAL ONCE
Status: CANCELLED | OUTPATIENT
Start: 2021-12-16

## 2021-12-17 ENCOUNTER — HOSPITAL ENCOUNTER (OUTPATIENT)
Dept: LAB | Facility: CLINIC | Age: 26
Discharge: HOME | End: 2021-12-17
Attending: PEDIATRICS | Admitting: PATHOLOGY
Payer: COMMERCIAL

## 2021-12-17 VITALS
WEIGHT: 239.2 LBS | BODY MASS INDEX: 32.76 KG/M2 | TEMPERATURE: 98.6 F | DIASTOLIC BLOOD PRESSURE: 83 MMHG | HEART RATE: 75 BPM | RESPIRATION RATE: 14 BRPM | SYSTOLIC BLOOD PRESSURE: 123 MMHG

## 2021-12-17 LAB
HCT VFR BLD AUTO: 33.5 % (ref 40–53)
HCT VFR BLD AUTO: 35.6 % (ref 40–53)
HGB BLD-MCNC: 11.1 G/DL (ref 13.3–17.7)
HGB BLD-MCNC: 11.9 G/DL (ref 13.3–17.7)

## 2021-12-17 PROCEDURE — 83021 HEMOGLOBIN CHROMOTOGRAPHY: CPT

## 2021-12-17 PROCEDURE — 36415 COLL VENOUS BLD VENIPUNCTURE: CPT

## 2021-12-17 PROCEDURE — 36512 APHERESIS RBC: CPT

## 2021-12-17 PROCEDURE — 250N000013 HC RX MED GY IP 250 OP 250 PS 637

## 2021-12-17 PROCEDURE — 250N000009 HC RX 250

## 2021-12-17 PROCEDURE — P9016 RBC LEUKOCYTES REDUCED: HCPCS

## 2021-12-17 PROCEDURE — 85018 HEMOGLOBIN: CPT

## 2021-12-17 PROCEDURE — 999N000128 HC STATISTIC PERIPHERAL IV START W/O US GUIDANCE

## 2021-12-17 RX ORDER — HYDROXYZINE HYDROCHLORIDE 25 MG/1
25 TABLET, FILM COATED ORAL ONCE
Status: DISCONTINUED | OUTPATIENT
Start: 2021-12-17 | End: 2021-12-17

## 2021-12-17 RX ORDER — HYDROXYZINE HYDROCHLORIDE 25 MG/1
25 TABLET, FILM COATED ORAL ONCE
Status: COMPLETED | OUTPATIENT
Start: 2021-12-17 | End: 2021-12-17

## 2021-12-17 RX ADMIN — ANTICOAGULANT CITRATE DEXTROSE SOLUTION FORMULA A 488 ML: 12.25; 11; 3.65 SOLUTION INTRAVENOUS at 09:14

## 2021-12-17 RX ADMIN — HYDROXYZINE HYDROCHLORIDE 25 MG: 25 TABLET, FILM COATED ORAL at 08:34

## 2021-12-17 NOTE — DISCHARGE INSTRUCTIONS
Red blood cell exchange:   If you received blood products (plasma or red blood cells) as part of your treatment, you need to be aware that transfusion reactions can occur up to several hours after they have been given to you.  Call your physician if you experience any symptoms in the next 48 hours, including breathing problems, rash, itching, hives, nausea or vomiting, fever or chills, blood in your urine or stools, or joint pain.  Please inform the Transfusion Medicine Physician by calling [phone] and asking for the physician on call.

## 2021-12-17 NOTE — PROCEDURES
Transfusion Medicine  Apheresis Procedure Note    Ricky Pak [id number]   YOB: 1995 Age: 26-year-old     Reason for consult: Red blood cell exchange           Assessment and Plan:     26-year-old male undergoes red cell exchange by apheresis. We plan to perform these exchanges once every 4 to 8 weeks. He has used peripheral veins in the past and these were used for access for the procedure.    He notes feeling well today.  Denies nausea, vomiting, fevers, chills.  There have been a few flow alarms with his peripheral vein, but otherwise the procedure has been going along well.  Due to issues with diphenhydramine, atarax was used as the premedication for Red Blood cell exchange.           History of Present Illness:     Ricky is a 26-year-old male who presents for red blood cell exchange. 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 He began apheresis exchanges around 2013 following his issues with iron overload.  Per previous documentation, these exchanges have done an excellent job preventing sickle cell crises. He had a hypotensive episode following his last apheresis procedure here which was a depletion followed by an exchange. Therefore, at this time we plan to proceed with an exchange alone. He is currently well. He works in a traveling job for medical equipment sterilization, so he comes here for his RBC exchanges when he is in the geographic area.             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           Past  Surgical History:     Past Surgical History:   Procedure Laterality Date     CHOLECYSTECTOMY N/A 2011     LIVER BIOPSY N/A 2007    iron overload monitoring     LIVER BIOPSY N/A 2008    monitoring for iron overload            Allergies:     Allergies   Allergen Reactions     Diphenhydramine Hives   .         Medications:     Current Outpatient Medications   Medication     acetaminophen-codeine (TYLENOL W/CODEINE #3) 300-30 MG per tablet     HYDROcodone-acetaminophen (NORCO) 5-325 MG tablet     No current facility-administered medications for this encounter.              Review of Systems:     See above          Exam:      /69   Pulse 72   Temp 98  F (36.7  C) (Oral)   Resp 16   Wt 108.5 kg (239 lb 3.2 oz)   BMI 32.76 kg/m       Alert, no apparent distress  Breathing appears comfortable on room air  Peripheral IV access for the procedure.         Data:     Results for orders placed or performed during the hospital encounter of 12/17/21   Hemoglobin and hematocrit     Status: Abnormal   Result Value Ref Range    Hemoglobin 11.9 (L) 13.3 - 17.7 g/dL    Hematocrit 35.6 (L) 40.0 - 53.0 %       [facility]   Lab 12/17/21  0855 12/15/21  1525   WBC  --  10.8   RBC  --  4.21*   HGB 11.9* 12.3*   HCT 35.6* 37.1*   MCV  --  88   MCH  --  29.2   MCHC  --  33.2   RDW  --  16.0*   PLT  --  423                 Procedure Summary:   A red blood cell exchange by apheresis was performed and donor red blood cells were used as the replacement fluid.  Peripheral veins were used for access and allowed for appropriate flow during the procedure.  ACD-A was used for anticoagulation.  The patient's vital signs were stable throughout.  The patient tolerated the procedure well without complication.  See apheresis flowsheet for additional details.      Attestation: During the procedure the patient was directly seen and evaluated by me, Maikel Del Toro MD.    Maikel Del Toro MD  Transfusion Medicine Attending  Laboratory  Medicine & Pathology  Pager: [phone]

## 2021-12-19 LAB — HGB S BLD QL: NORMAL

## 2021-12-20 ENCOUNTER — VIRTUAL VISIT (OUTPATIENT)
Dept: ONCOLOGY | Facility: CLINIC | Age: 26
End: 2021-12-20
Payer: COMMERCIAL

## 2021-12-20 DIAGNOSIS — D57.1 HB-SS DISEASE WITHOUT CRISIS (H): Primary | ICD-10-CM

## 2021-12-20 DIAGNOSIS — Z86.73 HISTORY OF CVA (CEREBROVASCULAR ACCIDENT): ICD-10-CM

## 2021-12-20 LAB
HGB S BLD QL: NORMAL
HGB S BLD QL: NORMAL

## 2021-12-20 PROCEDURE — 999N001193 HC VIDEO/TELEPHONE VISIT; NO CHARGE

## 2021-12-20 PROCEDURE — 99214 OFFICE O/P EST MOD 30 MIN: CPT | Mod: GT | Performed by: PEDIATRICS

## 2021-12-20 NOTE — PROGRESS NOTES
Ricky is a 26 year old who is being evaluated via a billable video visit.      How would you like to obtain your AVS? Raohart  If the video visit is dropped, the invitation should be resent by: Text to cell phone: [phone]  Will anyone else be joining your video visit? Shima Franklin     Video Start Time: 3:15 pm  Video End Time: 3:37 pm  Video-Visit Details    Type of service:  Video Visit    Originating Location (pt. Location): Other work    Distant Location (provider location):  [facility]     Platform used for Video Visit: [facility]     Adult Sickle Cell Outpatient Clinic Note      Date of Visit: 12/20/21  Ricky Pak is a 26 year old male who is being seen virtually for SCD care. He has previously been in our system but due to his traveling work schedule, it has been a year since his last visit.    Interval History  It is good to have desire back in the system. He shared that he had gone back to [place] after leaving here in the early spring months. He spent a few bit months back in [place] back home, and then he worked for several months in [place], where he was for the summer and fall before moving back here. He has been back in the [facility] for about 2 weeks, currently working up in the [place] area. He expects to be here for at least 3 months, maybe longer, with his work contract ending in early March. He did reach out few weeks ago to make sure that he can get back in for apheresis for his exchanges and he was able to do that. His apheresis was last week, and it went very well. His hemoglobin S level is at or below goal and he does not have any kind of pain or other obvious sickle cell symptoms. He has noticed that sometimes his vision has been a little bit tough with bright lights or in the dark, and he is already scheduled to be see an ophthalmologist within the next couple weeks. He said it is different than when he had his macular  infarct but he is always worried about his eyesight. No other hospitalizations no other major health issues since his last visit here.    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 He was on monthly transfusions from [id number] with chelation and in 2013 he was switched to peripheral RBC exchange which has kept him in great health since then. He no longer needs chelation and is able to keep his HbS ~30% or below with regularity. He grew up in [place] and has spent most of his life in the [facility]. 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 He just got sent to [facility] and moved here one week ago. He denies regular pain and has not had a pain crisis since he was a teenager. He has kept up with his exchange transfusions and feels quite healthy overall. He regained the vision after having a macular infarct at age 10 and has no further neurologic symptoms since. He does not take any regular medications and has not had any opioids in many years either. He has no complaints today. He met with the apheresis team before this visit and is squared away to be maintained on his exchange schedule here.    Sickle Cell Disease Comprehensive Checklist    Bone Health/Avascular Necrosis Screening/Symptoms (each visit): 11/6/2020, none    Leg Ulcer evaluation (every visit): 11/6/2020, none    Hypertension (every visit): 11/6/2020, none    Last ophthalmologic exam: annual, timing unclear    Last urinalysis for microalbuminuria/CKD (annually): 11/6/2020, negative    Last pulmonary evaluation (asthma, WILFREDO, pulm HTN): unknown    Stroke/silent cerebral infarct Hx (Y/N): macular infarct ~2006, vision now normalized    Last  PCP Visit: just moved to [place]    Vaccines:   Reported up to date. Flu shot (2020-21) from work    Plan last reviewed with patient: 11/6/2020    Patient background: 23 yo male who recently moved from [place], late 2020. 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 No children or significant others    Sickle Cell Disease History  Primary Hematologist: Janiya  Genotype: SS  Acute Pain Crisis Treatment: (Opioid-naive)    ER/Acute Care/Infusion Clinic:   o Morphine 1 mg IVP/SC Q1H X 3 doses  o Other: Benadryl PO and Zofran 8 mg IV    Inpatient:  o Opioid: Morphine 1 mg IV Q1H PRN until PCA starts  o PCA plan:  - PCA button dose: Morphine 1 mg  - Lockout: 20 minutes  - Continuous Infusion: consider 0.5 mg/hr  o Other Medications: Benadryl, Zofran  o Supportive Care: Docusate, Senna  Chronic Pain Medications:    none  Baseline Hemoglobin: 12 mg/dl  Hydroxyurea use: no  H/O blood transfusions: Yes (partial exchange since 2013, full transfusion protocol 2006-13, now fully chelated)    H/O Transfusion Reactions: no    Antibodies: none known  H/O Acute Chest Syndrome: none    Last episode: n/a    ICU/intubation: no  H/O Stroke: Yes (macular infarct ~2006, vision normalized)  H/O VTE: no  H/O Cholecystectomy or Splenectomy: Cholecystectomy (2011)  H/O Asthma, Pulm HTN, AVN, Leg Ulcers, Nephropathy, Retinopathy, etc: none    Review Of Systems:  General: Good energy and appetite. No fevers. No concerns for pain.   HEENT: Denies concerns with vision or hearing.  No yellowing of the whites of eyes.  Respiratory: No SOB or orthopnea. No cough. No wheezing.   Cardiovascular: No chest pain, dizziness  nor palpitations.   Endocrine: No hot/cold intolerance. No increase thirst or urination.   GI: No n/v/d/d nor abdominal pain. No splenic pain  : No difficulty with urination. No hematuria.   Skin: No current rashes, bruises, petechiae or other skin lesions noted.   Neuro:  "No weakness or numbness. No HA.  MSK: No change in ROM or function.     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       Past Surgical History:  Past Surgical History:   Procedure Laterality Date     CHOLECYSTECTOMY N/A 2011     LIVER BIOPSY N/A 2007    iron overload monitoring     LIVER BIOPSY N/A 2008    monitoring for iron overload       Past Family History:   Family History   Problem Relation Age of Onset     Sickle Cell Trait Mother      Sickle Cell Trait Father      No Known Problems Sister      No Known Problems Sister      Sickle Cell Trait Brother      Breast Cancer Maternal Grandmother      Breast Cancer Maternal Great-Grandmother        Social History:   Social History     Socioeconomic History     Marital status: Single     Spouse name: Not on file     Number of children: Not on file     Years of education: Not on file     Highest education level: Not on file   Occupational History     Not on file   Tobacco Use     Smoking status: Never Smoker     Smokeless tobacco: Never Used   Substance and Sexual Activity     Alcohol use: Yes     Alcohol/week: 2.0 standard drinks     Types: 2 Standard drinks or equivalent per week     Comment: \"A few beers every other weekend.\"     Drug use: Never     Sexual activity: Not on file   Other Topics Concern     Not on file   Social History Narrative    Recently moved to [place]. 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     Social Determinants of Health     Financial Resource Strain: Not on file   Food Insecurity: Not on file   Transportation Needs: Not on file   Physical Activity: Not on file   Stress: Not on file   Social " Connections: Not on file   Intimate Partner Violence: Not on file   Housing Stability: Not on file       Current Medications:    Current Outpatient Medications   Medication     acetaminophen-codeine (TYLENOL W/CODEINE #3) 300-30 MG per tablet     HYDROcodone-acetaminophen (NORCO) 5-325 MG tablet     No current facility-administered medications for this visit.           Physical Exam:  There were no vitals taken for this visit. (video)    GENERAL: Healthy, alert and no distress  EYES: Eyes grossly normal to inspection.  No discharge or erythema, or obvious scleral/conjunctival abnormalities.  RESP: No audible wheeze, cough, or visible cyanosis.  No visible retractions or increased work of breathing.    SKIN: Visible skin clear. No significant rash, abnormal pigmentation or lesions.  NEURO: Cranial nerves grossly intact.  Mentation and speech appropriate for age.  PSYCH: Mentation appears normal, affect normal/bright, judgement and insight intact, normal speech and appearance well-groomed.  .     Labs:  Results for OLYA CONROY (MRN [id number]) as of 12/21/2021 22:18 (pre-apheresis)   Ref. Range 12/15/2021 15:25   WBC Latest Ref Range: 4.0 - 11.0 10e3/uL 10.8   Hemoglobin Latest Ref Range: 13.3 - 17.7 g/dL 12.3 (L)   Hematocrit Latest Ref Range: 40.0 - 53.0 % 37.1 (L)   Platelet Count Latest Ref Range: 150 - 450 10e3/uL 423   RBC Count Latest Ref Range: 4.40 - 5.90 10e6/uL 4.21 (L)   MCV Latest Ref Range: 78 - 100 fL 88   MCH Latest Ref Range: 26.5 - 33.0 pg 29.2   MCHC Latest Ref Range: 31.5 - 36.5 g/dL 33.2   RDW Latest Ref Range: 10.0 - 15.0 % 16.0 (H)   % Neutrophils Latest Units: % 56   % Lymphocytes Latest Units: % 27   % Monocytes Latest Units: % 11   % Eosinophils Latest Units: % 4   % Basophils Latest Units: % 1   Absolute Basophils Latest Ref Range: 0.0 - 0.2 10e3/uL 0.1   Absolute Eosinophils Latest Ref Range: 0.0 - 0.7 10e3/uL 0.4   Absolute Immature Granulocytes Latest Ref Range: <=0.4 10e3/uL 0.1    Absolute Lymphocytes Latest Ref Range: 0.8 - 5.3 10e3/uL 2.9   Absolute Monocytes Latest Ref Range: 0.0 - 1.3 10e3/uL 1.1   % Immature Granulocytes Latest Units: % 1   Absolute Neutrophils Latest Ref Range: 1.6 - 8.3 10e3/uL 6.2   Absolute NRBCs Latest Units: 10e3/uL 0.0   NRBCs per 100 WBC Latest Ref Range: <1 /100 0   % Retic Latest Ref Range: 0.5 - 2.0 % 5.8 (H)   Absolute Retic Latest Ref Range: 0.025 - 0.095 10e6/uL 0.220 (H)   ABO/Rh(D) Unknown O POS   Antibody Screen Latest Ref Range: Negative  Negative   SPECIMEN EXPIRATION DATE Unknown [id number]       Assessment:   Ricky Pak is a 26 year old male with HbSS Disease and a history of macular infarct who recently moved back to [place]. He was last back here about 10 months ago and has been in [place] in [place] since then. He continues to do very well with his sickle cell care. He had even moved to get his ophthalmology appointment set up before with the been chatted. He is not having any kind of pain issues and we have him back on his apheresis plan that he was doing before. Given his location up in [place], we will try to minimize his trips done here. I will try to coordinate the next visit with him in in February, probably linked to his apheresis appointment on February 11.     Plan:  1) Labs: CBC  2) Referral: none   3) Follow up: virtual visit in 2 months (12/11) in conjunction with apheresis appointment    I spent a total of 35 minutes during the video visit and documentation along with lab review on the day of the appointment      Manny Denson MD  Hematologist  Division of Hematology, Oncology, and Transplantation  [facility]  [address]  Pager: [phone]

## 2022-01-12 ENCOUNTER — LAB (OUTPATIENT)
Dept: LAB | Facility: CLINIC | Age: 27
End: 2022-01-12
Attending: PEDIATRICS
Payer: COMMERCIAL

## 2022-01-12 DIAGNOSIS — D57.1 SICKLE CELL DISEASE WITHOUT CRISIS (H): ICD-10-CM

## 2022-01-12 LAB
ABO/RH(D): NORMAL
ANTIBODY SCREEN: NEGATIVE
BASOPHILS # BLD AUTO: 0 10E3/UL (ref 0–0.2)
BASOPHILS NFR BLD AUTO: 0 %
EOSINOPHIL # BLD AUTO: 0.4 10E3/UL (ref 0–0.7)
EOSINOPHIL NFR BLD AUTO: 5 %
ERYTHROCYTE [DISTWIDTH] IN BLOOD BY AUTOMATED COUNT: 17.2 % (ref 10–15)
HCT VFR BLD AUTO: 34.1 % (ref 40–53)
HGB BLD-MCNC: 11.5 G/DL (ref 13.3–17.7)
IMM GRANULOCYTES # BLD: 0 10E3/UL
IMM GRANULOCYTES NFR BLD: 1 %
LYMPHOCYTES # BLD AUTO: 2 10E3/UL (ref 0.8–5.3)
LYMPHOCYTES NFR BLD AUTO: 21 %
MCH RBC QN AUTO: 28.9 PG (ref 26.5–33)
MCHC RBC AUTO-ENTMCNC: 33.7 G/DL (ref 31.5–36.5)
MCV RBC AUTO: 86 FL (ref 78–100)
MONOCYTES # BLD AUTO: 1.2 10E3/UL (ref 0–1.3)
MONOCYTES NFR BLD AUTO: 15 %
NEUTROPHILS # BLD AUTO: 5.2 10E3/UL (ref 1.6–8.3)
NEUTROPHILS NFR BLD AUTO: 58 %
NRBC # BLD AUTO: 0 10E3/UL
NRBC BLD AUTO-RTO: 0 /100
PLATELET # BLD AUTO: 389 10E3/UL (ref 150–450)
RBC # BLD AUTO: 3.98 10E6/UL (ref 4.4–5.9)
RETICS # AUTO: 0.29 10E6/UL (ref 0.03–0.1)
RETICS/RBC NFR AUTO: 7.7 % (ref 0.5–2)
SPECIMEN EXPIRATION DATE: NORMAL
WBC # BLD AUTO: 8.3 10E3/UL (ref 4–11)

## 2022-01-12 PROCEDURE — 83021 HEMOGLOBIN CHROMOTOGRAPHY: CPT

## 2022-01-12 PROCEDURE — 86901 BLOOD TYPING SEROLOGIC RH(D): CPT

## 2022-01-12 PROCEDURE — 86923 COMPATIBILITY TEST ELECTRIC: CPT

## 2022-01-12 PROCEDURE — 36415 COLL VENOUS BLD VENIPUNCTURE: CPT

## 2022-01-12 PROCEDURE — 85045 AUTOMATED RETICULOCYTE COUNT: CPT

## 2022-01-12 PROCEDURE — 85025 COMPLETE CBC W/AUTO DIFF WBC: CPT

## 2022-01-13 LAB
BLD PROD TYP BPU: NORMAL
BLOOD COMPONENT TYPE: NORMAL
CODING SYSTEM: NORMAL
CROSSMATCH: NORMAL
UNIT ABO/RH: NORMAL
UNIT NUMBER: NORMAL
UNIT STATUS: NORMAL
UNIT TYPE ISBT: 9500

## 2022-01-13 RX ORDER — HYDROXYZINE HYDROCHLORIDE 25 MG/1
25 TABLET, FILM COATED ORAL ONCE
Status: CANCELLED | OUTPATIENT
Start: 2022-01-13

## 2022-01-14 ENCOUNTER — HOSPITAL ENCOUNTER (OUTPATIENT)
Dept: LAB | Facility: CLINIC | Age: 27
Discharge: HOME | End: 2022-01-14
Attending: PEDIATRICS | Admitting: PEDIATRICS
Payer: COMMERCIAL

## 2022-01-14 VITALS
TEMPERATURE: 98.1 F | BODY MASS INDEX: 32.09 KG/M2 | DIASTOLIC BLOOD PRESSURE: 69 MMHG | HEART RATE: 97 BPM | WEIGHT: 234.35 LBS | SYSTOLIC BLOOD PRESSURE: 119 MMHG | RESPIRATION RATE: 18 BRPM

## 2022-01-14 LAB
BLD PROD TYP BPU: NORMAL
BLOOD COMPONENT TYPE: NORMAL
CODING SYSTEM: NORMAL
CROSSMATCH: NORMAL
HCT VFR BLD AUTO: 29.3 % (ref 40–53)
HCT VFR BLD AUTO: 30.7 % (ref 40–53)
HGB BLD-MCNC: 10.3 G/DL (ref 13.3–17.7)
HGB BLD-MCNC: 9.8 G/DL (ref 13.3–17.7)
HGB S BLD QL: NORMAL
ISSUE DATE AND TIME: NORMAL
UNIT ABO/RH: NORMAL
UNIT NUMBER: NORMAL
UNIT STATUS: NORMAL
UNIT TYPE ISBT: 9500

## 2022-01-14 PROCEDURE — 250N000013 HC RX MED GY IP 250 OP 250 PS 637

## 2022-01-14 PROCEDURE — 36415 COLL VENOUS BLD VENIPUNCTURE: CPT

## 2022-01-14 PROCEDURE — 83021 HEMOGLOBIN CHROMOTOGRAPHY: CPT

## 2022-01-14 PROCEDURE — P9016 RBC LEUKOCYTES REDUCED: HCPCS

## 2022-01-14 PROCEDURE — 250N000009 HC RX 250

## 2022-01-14 PROCEDURE — 36512 APHERESIS RBC: CPT

## 2022-01-14 PROCEDURE — 999N000127 HC STATISTIC PERIPHERAL IV START W US GUIDANCE

## 2022-01-14 PROCEDURE — 85018 HEMOGLOBIN: CPT

## 2022-01-14 RX ORDER — HYDROXYZINE HYDROCHLORIDE 25 MG/1
25 TABLET, FILM COATED ORAL ONCE
Status: COMPLETED | OUTPATIENT
Start: 2022-01-14 | End: 2022-01-14

## 2022-01-14 RX ADMIN — ANTICOAGULANT CITRATE DEXTROSE SOLUTION FORMULA A 547 ML: 12.25; 11; 3.65 SOLUTION INTRAVENOUS at 13:18

## 2022-01-14 RX ADMIN — HYDROXYZINE HYDROCHLORIDE 25 MG: 25 TABLET, FILM COATED ORAL at 12:29

## 2022-01-14 NOTE — DISCHARGE INSTRUCTIONS
Red blood cell exchange:   If you received blood products (plasma or red blood cells) as part of your treatment, you need to be aware that transfusion reactions can occur up to several hours after they have been given to you.  Call your physician if you experience any symptoms in the next 48 hours, including breathing problems, rash, itching, hives, nausea or vomiting, fever or chills, blood in your urine or stools, or joint pain.  Please inform the Transfusion Medicine Physician by calling [phone] and asking for the physician on call.Apheresis Blood Donor Center Post Instructions  You may feel tired after your procedure today.   Please call your doctor if you have:  bleeding that doesn t stop, fever, pain where a needle or tube (catheter) was placed, seizures, trouble breathing, red urine, nausea or vomiting, other health concerns.     If your symptoms are severe, call [phone].  If your veins were used, keep the bandages on for 2-4 hours.  Avoid heavy lifting with your arms.  If bleeding occurs from these sites, apply firm pressure for 5-10 minutes.  Call your physician if bleeding continues.    The Apheresis/Blood Donor Center is open Monday-Friday 7:30 a.m. to 5 p.m.  The phone number is [phone].  A Transfusion Medicine physician can be reached after 5:00 p.m. weekdays and on weekends /Holidays by calling [phone], and asking for the physician on call.

## 2022-01-14 NOTE — PROCEDURES
Transfusion Medicine  Apheresis Procedure Note    Ricky Pak [id number]   YOB: 1995 Age: 26-year-old     Reason for consult: Red blood cell exchange           Assessment and Plan:     26-year-old male undergoes red cell exchange by apheresis. We plan to perform these exchanges once every 4 to 8 weeks. He has used peripheral veins in the past and these were used for access for the procedure.    He notes feeling well today.  Denies nausea, vomiting, fevers, chills, cough, cold.  We did need to replace the access needle after we lost peripheral access during the run.  Due to issues with diphenhydramine, atarax was used as the premedication for Red Blood cell exchange.           History of Present Illness:     Ricky is a 26-year-old male who presents for red blood cell exchange. 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 He began apheresis exchanges around 2013 following his issues with iron overload.  Per previous documentation, these exchanges have done an excellent job preventing sickle cell crises. He had a hypotensive episode following his last apheresis procedure here which was a depletion followed by an exchange. Therefore, at this time we plan to proceed with an exchange alone. He is currently well. He works in a traveling job for medical equipment sterilization, so he comes here for his RBC exchanges when he is in the geographic area.             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           Past Surgical History:      Past Surgical History:   Procedure Laterality Date     CHOLECYSTECTOMY N/A 2011     LIVER BIOPSY N/A 2007    iron overload monitoring     LIVER BIOPSY N/A 2008    monitoring for iron overload            Allergies:     Allergies   Allergen Reactions     Diphenhydramine Hives   .         Medications:     Current Outpatient Medications   Medication     acetaminophen-codeine (TYLENOL W/CODEINE #3) 300-30 MG per tablet     HYDROcodone-acetaminophen (NORCO) 5-325 MG tablet     No current facility-administered medications for this encounter.              Review of Systems:     See above          Exam:      /69   Pulse 97   Temp 98.1  F (36.7  C) (Oral)   Resp 18   Wt 106.3 kg (234 lb 5.6 oz)   BMI 32.09 kg/m       Resting for much of the procedure, no apparent distress  Breathing appears comfortable on room air  Peripheral IV access for the procedure.         Data:     Results for orders placed or performed during the hospital encounter of 01/14/22   Hemoglobin and hematocrit     Status: Abnormal   Result Value Ref Range    Hemoglobin 10.3 (L) 13.3 - 17.7 g/dL    Hematocrit 30.7 (L) 40.0 - 53.0 %   Hemoglobin and hematocrit     Status: Abnormal   Result Value Ref Range    Hemoglobin 9.8 (L) 13.3 - 17.7 g/dL    Hematocrit 29.3 (L) 40.0 - 53.0 %       CBC  Recent Labs   Lab 01/14/22  1553 01/14/22  1310 01/12/22  0819   WBC  --   --  8.3   RBC  --   --  3.98*   HGB 9.8* 10.3* 11.5*   HCT 29.3* 30.7* 34.1*   MCV  --   --  86   MCH  --   --  28.9   MCHC  --   --  33.7   RDW  --   --  17.2*   PLT  --   --  389                 Procedure Summary:   A red blood cell exchange by apheresis was performed and donor red blood cells were used as the replacement fluid.  Peripheral veins were used for access and allowed for appropriate flow during the procedure.  ACD-A was used for anticoagulation.  The patient's vital signs were stable throughout.  The patient tolerated the procedure well without complication.  See  apheresis flowsheet for additional details.        Attestation: During the procedure the patient was directly seen and evaluated by me, Maikel Del Toro MD.    Maikel Del Toro MD  Transfusion Medicine Attending  Laboratory Medicine & Pathology  Pager: [phone]

## 2022-01-17 LAB
HGB S BLD QL: NORMAL
HGB S BLD QL: NORMAL

## 2022-02-09 ENCOUNTER — LAB (OUTPATIENT)
Dept: LAB | Facility: CLINIC | Age: 27
End: 2022-02-09
Attending: PEDIATRICS
Payer: COMMERCIAL

## 2022-02-09 DIAGNOSIS — D57.1 SICKLE CELL DISEASE WITHOUT CRISIS (H): Primary | ICD-10-CM

## 2022-02-09 LAB
ABO/RH(D): NORMAL
ANTIBODY SCREEN: NEGATIVE
BASOPHILS # BLD AUTO: 0.1 10E3/UL (ref 0–0.2)
BASOPHILS NFR BLD AUTO: 2 %
EOSINOPHIL # BLD AUTO: 0.3 10E3/UL (ref 0–0.7)
EOSINOPHIL NFR BLD AUTO: 4 %
ERYTHROCYTE [DISTWIDTH] IN BLOOD BY AUTOMATED COUNT: 18.2 % (ref 10–15)
HCT VFR BLD AUTO: 34.4 % (ref 40–53)
HGB BLD-MCNC: 11.3 G/DL (ref 13.3–17.7)
IMM GRANULOCYTES # BLD: 0 10E3/UL
IMM GRANULOCYTES NFR BLD: 0 %
LYMPHOCYTES # BLD AUTO: 2.1 10E3/UL (ref 0.8–5.3)
LYMPHOCYTES NFR BLD AUTO: 26 %
MCH RBC QN AUTO: 27.7 PG (ref 26.5–33)
MCHC RBC AUTO-ENTMCNC: 32.8 G/DL (ref 31.5–36.5)
MCV RBC AUTO: 84 FL (ref 78–100)
MONOCYTES # BLD AUTO: 0.8 10E3/UL (ref 0–1.3)
MONOCYTES NFR BLD AUTO: 10 %
NEUTROPHILS # BLD AUTO: 4.7 10E3/UL (ref 1.6–8.3)
NEUTROPHILS NFR BLD AUTO: 58 %
NRBC # BLD AUTO: 0 10E3/UL
NRBC BLD AUTO-RTO: 1 /100
PLATELET # BLD AUTO: 547 10E3/UL (ref 150–450)
RBC # BLD AUTO: 4.08 10E6/UL (ref 4.4–5.9)
SPECIMEN EXPIRATION DATE: NORMAL
WBC # BLD AUTO: 8.1 10E3/UL (ref 4–11)

## 2022-02-09 PROCEDURE — 36415 COLL VENOUS BLD VENIPUNCTURE: CPT

## 2022-02-09 PROCEDURE — 86850 RBC ANTIBODY SCREEN: CPT

## 2022-02-09 PROCEDURE — 85025 COMPLETE CBC W/AUTO DIFF WBC: CPT

## 2022-02-09 PROCEDURE — 86901 BLOOD TYPING SEROLOGIC RH(D): CPT

## 2022-02-09 NOTE — NURSING NOTE
Chief Complaint   Patient presents with     Blood Draw     Labs drawn via  by rn in lab.     Labs collected from venipuncture by RN.     Bull Santoro RN

## 2022-02-10 RX ORDER — HYDROXYZINE HYDROCHLORIDE 25 MG/1
25 TABLET, FILM COATED ORAL ONCE
Status: DISCONTINUED | OUTPATIENT
Start: 2022-02-11 | End: 2022-02-10 | Stop reason: HOSPADM

## 2022-02-10 RX ORDER — ACETAMINOPHEN 325 MG/1
650 TABLET ORAL
Status: CANCELLED | OUTPATIENT
Start: 2022-02-10

## 2022-02-11 ENCOUNTER — VIRTUAL VISIT (OUTPATIENT)
Dept: ONCOLOGY | Facility: CLINIC | Age: 27
End: 2022-02-11
Attending: PEDIATRICS
Payer: COMMERCIAL

## 2022-02-11 ENCOUNTER — HOSPITAL ENCOUNTER (OUTPATIENT)
Dept: LAB | Facility: CLINIC | Age: 27
End: 2022-02-11
Attending: PEDIATRICS
Payer: COMMERCIAL

## 2022-02-11 VITALS
WEIGHT: 235.45 LBS | HEART RATE: 94 BPM | BODY MASS INDEX: 32.24 KG/M2 | DIASTOLIC BLOOD PRESSURE: 63 MMHG | TEMPERATURE: 97.9 F | SYSTOLIC BLOOD PRESSURE: 114 MMHG | RESPIRATION RATE: 16 BRPM

## 2022-02-11 DIAGNOSIS — D57.1 SICKLE CELL DISEASE WITHOUT CRISIS (H): ICD-10-CM

## 2022-02-11 DIAGNOSIS — D57.1 HB-SS DISEASE WITHOUT CRISIS (H): Primary | ICD-10-CM

## 2022-02-11 DIAGNOSIS — Z86.73 HISTORY OF CVA (CEREBROVASCULAR ACCIDENT): ICD-10-CM

## 2022-02-11 LAB
ALBUMIN SERPL-MCNC: 4 G/DL (ref 3.4–5)
ALP SERPL-CCNC: 108 U/L (ref 40–150)
ALT SERPL W P-5'-P-CCNC: 43 U/L (ref 0–70)
ANION GAP SERPL CALCULATED.3IONS-SCNC: 8 MMOL/L (ref 3–14)
AST SERPL W P-5'-P-CCNC: 40 U/L (ref 0–45)
BILIRUB SERPL-MCNC: 1.1 MG/DL (ref 0.2–1.3)
BUN SERPL-MCNC: 12 MG/DL (ref 7–30)
CALCIUM SERPL-MCNC: 8.8 MG/DL (ref 8.5–10.1)
CHLORIDE BLD-SCNC: 107 MMOL/L (ref 94–109)
CO2 SERPL-SCNC: 25 MMOL/L (ref 20–32)
CREAT SERPL-MCNC: 0.79 MG/DL (ref 0.66–1.25)
GFR SERPL CREATININE-BSD FRML MDRD: >90 ML/MIN/1.73M2
GLUCOSE BLD-MCNC: 81 MG/DL (ref 70–99)
HCT VFR BLD AUTO: 30 % (ref 40–53)
HCT VFR BLD AUTO: 31 % (ref 40–53)
HGB BLD-MCNC: 10 G/DL (ref 13.3–17.7)
HGB BLD-MCNC: 10.5 G/DL (ref 13.3–17.7)
POTASSIUM BLD-SCNC: 3.8 MMOL/L (ref 3.4–5.3)
PROT SERPL-MCNC: 8 G/DL (ref 6.8–8.8)
RETICS # AUTO: 0.21 10E6/UL (ref 0.03–0.1)
RETICS/RBC NFR AUTO: 5.8 % (ref 0.5–2)
SODIUM SERPL-SCNC: 140 MMOL/L (ref 133–144)

## 2022-02-11 PROCEDURE — P9016 RBC LEUKOCYTES REDUCED: HCPCS

## 2022-02-11 PROCEDURE — 85045 AUTOMATED RETICULOCYTE COUNT: CPT

## 2022-02-11 PROCEDURE — 85018 HEMOGLOBIN: CPT

## 2022-02-11 PROCEDURE — 80053 COMPREHEN METABOLIC PANEL: CPT

## 2022-02-11 PROCEDURE — 36415 COLL VENOUS BLD VENIPUNCTURE: CPT

## 2022-02-11 PROCEDURE — 250N000013 HC RX MED GY IP 250 OP 250 PS 637

## 2022-02-11 PROCEDURE — 250N000009 HC RX 250

## 2022-02-11 PROCEDURE — 36512 APHERESIS RBC: CPT

## 2022-02-11 PROCEDURE — 82040 ASSAY OF SERUM ALBUMIN: CPT

## 2022-02-11 PROCEDURE — 99213 OFFICE O/P EST LOW 20 MIN: CPT | Mod: GT | Performed by: PEDIATRICS

## 2022-02-11 PROCEDURE — 999N000127 HC STATISTIC PERIPHERAL IV START W US GUIDANCE

## 2022-02-11 PROCEDURE — 83021 HEMOGLOBIN CHROMOTOGRAPHY: CPT

## 2022-02-11 RX ORDER — HYDROXYZINE HYDROCHLORIDE 25 MG/1
25 TABLET, FILM COATED ORAL ONCE
Status: COMPLETED | OUTPATIENT
Start: 2022-02-11 | End: 2022-02-11

## 2022-02-11 RX ORDER — HYDROXYZINE HYDROCHLORIDE 25 MG/1
25 TABLET, FILM COATED ORAL ONCE
Status: DISCONTINUED | OUTPATIENT
Start: 2022-02-11 | End: 2022-02-11 | Stop reason: CLARIF

## 2022-02-11 RX ADMIN — ANTICOAGULANT CITRATE DEXTROSE SOLUTION FORMULA A 500 ML: 12.25; 11; 3.65 SOLUTION INTRAVENOUS at 12:55

## 2022-02-11 RX ADMIN — HYDROXYZINE HYDROCHLORIDE 25 MG: 25 TABLET, FILM COATED ORAL at 12:42

## 2022-02-11 NOTE — PROGRESS NOTES
Adult Sickle Cell Outpatient Clinic Note      Ricky is a 26 year old who is being evaluated via a billable video visit.      How would you like to obtain your AVS? swabr  If the video visit is dropped, the invitation should be resent by: Text to cell phone: [phone]  Will anyone else be joining your video visit? No      Video-Visit Details    Type of service:  Video Visit  Video Start Time:2:59 pm  Video End Time:3:08 pm    Originating Location (pt. Location): Other apheresis clinic    Distant Location (provider location):  Home    Platform used for Video Visit: Savannah García is a 26 year old who is being evaluated via a billable video visit.            Date of Visit: 02/11/2022    Ricky Pak is a 26 year old male who is being seen virtually for SCD care. He intermittently gets care in our system when his work schedules him in [place]. He started back here in December 2021 for a 3 month work stint.    Interval History  Ricky has been here for 2.5 months and he has been very happy with his time here. He will be in [place], in [place], for the next month and then he will get relocated by his job. He does not know quite yet where he is going. He expects to hear within the next week or so. His hope is to go out west somewhere, perhaps [place] or [facility], but he is not sure where that will be it. He will let us know via swabr once he knows. He has not had any issues with a pheresis this time. He is explicitly shared how Smead the transition was coming back here and that our system has been very easy to work with, perhaps the easiest he has had in a long while. He dressed that if he were to have an issue, we would be able to manage it. He has not felt like that in every location, for example if he had a transfusion reaction. He has not had any recent illnesses. No new eye pain. No other pain crises. He did remark on how cold it is up in [place], even compared to the [facility] closer to where he  was last time. Thus, he was very happy did not have a cold-induced crisis so far.    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 He was on monthly transfusions from 5693-4925 with chelation and in 2013 he was switched to peripheral RBC exchange which has kept him in great health since then. He no longer needs chelation and is able to keep his HbS ~30% or below with regularity. He grew up in [place] and has spent most of his life in the [facility]. 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 He just got sent to [facility] and moved here one week ago. He denies regular pain and has not had a pain crisis since he was a teenager. He has kept up with his exchange transfusions and feels quite healthy overall. He regained the vision after having a macular infarct at age 10 and has no further neurologic symptoms since. He does not take any regular medications and has not had any opioids in many years either. He has no complaints today. He met with the apheresis team before this visit and is squared away to be maintained on his exchange schedule here.    Sickle Cell Disease Comprehensive Checklist    Bone Health/Avascular Necrosis Screening/Symptoms (each visit): 11/6/2020, none    Leg Ulcer evaluation (every visit): 11/6/2020, none    Hypertension (every visit): 11/6/2020, none    Last ophthalmologic exam: annual, timing unclear    Last urinalysis for microalbuminuria/CKD (annually): 11/6/2020, negative    Last pulmonary evaluation (asthma, WILFREDO, pulm HTN): unknown    Stroke/silent cerebral infarct Hx (Y/N): macular infarct ~2006, vision now normalized    Last PCP Visit: just moved to [place]    Vaccines:   Reported up  to date. Flu shot (2020-21) from work    Plan last reviewed with patient: 12/20/21    Patient background: 23 yo male who recently moved from [place], late 2020. 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 No children or significant others    Sickle Cell Disease History  Primary Hematologist: Janiya  Genotype: SS  Acute Pain Crisis Treatment: (Opioid-naive)    ER/Acute Care/Infusion Clinic:   o Morphine 1 mg IVP/SC Q1H X 3 doses  o Other: Benadryl PO and Zofran 8 mg IV    Inpatient:  o Opioid: Morphine 1 mg IV Q1H PRN until PCA starts  o PCA plan:  - PCA button dose: Morphine 1 mg  - Lockout: 20 minutes  - Continuous Infusion: consider 0.5 mg/hr  o Other Medications: Benadryl, Zofran  o Supportive Care: Docusate, Senna  Chronic Pain Medications:    none  Baseline Hemoglobin: 12 mg/dl  Hydroxyurea use: no  H/O blood transfusions: Yes (partial exchange since 2013, full transfusion protocol 2006-13, now fully chelated)    H/O Transfusion Reactions: no    Antibodies: none known  H/O Acute Chest Syndrome: none    Last episode: n/a    ICU/intubation: no  H/O Stroke: Yes (macular infarct ~2006, vision normalized)  H/O VTE: no  H/O Cholecystectomy or Splenectomy: Cholecystectomy (2011)  H/O Asthma, Pulm HTN, AVN, Leg Ulcers, Nephropathy, Retinopathy, etc: none    Review Of Systems:  General: Good energy and appetite. No fevers. No concerns for pain.   HEENT: Denies concerns with vision or hearing.  No yellowing of the whites of eyes.  Respiratory: No SOB or orthopnea. No cough. No wheezing.   Cardiovascular: No chest pain, dizziness  nor palpitations.   Endocrine: No hot/cold intolerance. No increase thirst or urination.   GI: No n/v/d/d nor abdominal pain. No splenic pain  : No difficulty with urination. No hematuria.   Skin: No current rashes, bruises, petechiae or other skin lesions noted.   Neuro: No weakness or numbness. No HA.  MSK: No change in ROM or  "function.     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       Past Surgical History:  Past Surgical History:   Procedure Laterality Date     CHOLECYSTECTOMY N/A 2011     LIVER BIOPSY N/A 2007    iron overload monitoring     LIVER BIOPSY N/A 2008    monitoring for iron overload       Past Family History:   Family History   Problem Relation Age of Onset     Sickle Cell Trait Mother      Sickle Cell Trait Father      No Known Problems Sister      No Known Problems Sister      Sickle Cell Trait Brother      Breast Cancer Maternal Grandmother      Breast Cancer Maternal Great-Grandmother        Social History:   Social History     Socioeconomic History     Marital status: Single     Spouse name: Not on file     Number of children: Not on file     Years of education: Not on file     Highest education level: Not on file   Occupational History     Not on file   Tobacco Use     Smoking status: Never Smoker     Smokeless tobacco: Never Used   Substance and Sexual Activity     Alcohol use: Yes     Alcohol/week: 2.0 standard drinks     Types: 2 Standard drinks or equivalent per week     Comment: \"A few beers every other weekend.\"     Drug use: Never     Sexual activity: Not on file   Other Topics Concern     Not on file   Social History Narrative    Recently moved to [place]. 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     Social Determinants of Health     Financial Resource Strain: Not on file   Food Insecurity: Not on file   Transportation Needs: Not on file   Physical Activity: Not on file   Stress: Not on file   Social Connections: Not on file   Intimate Partner Violence: Not on file "   Housing Stability: Not on file       Current Medications:    Current Outpatient Medications   Medication     acetaminophen-codeine (TYLENOL W/CODEINE #3) 300-30 MG per tablet     HYDROcodone-acetaminophen (NORCO) 5-325 MG tablet     No current facility-administered medications for this visit.     Facility-Administered Medications Ordered in Other Visits   Medication     Anticoagulant Citrate Dextrose Formula A   (Apheresis Center)           Physical Exam:  There were no vitals taken for this visit. (audio on Amwell, unable to get video)    No exam  .     Labs:  Results for RICKY PAK (MRN [id number]) as of 2/11/2022 15:18   Ref. Range 2/11/2022 12:57   Sodium Latest Ref Range: 133 - 144 mmol/L 140   Potassium Latest Ref Range: 3.4 - 5.3 mmol/L 3.8   Chloride Latest Ref Range: 94 - 109 mmol/L 107   Carbon Dioxide Latest Ref Range: 20 - 32 mmol/L 25   Urea Nitrogen Latest Ref Range: 7 - 30 mg/dL 12   Creatinine Latest Ref Range: 0.66 - 1.25 mg/dL 0.79   GFR Estimate Latest Ref Range: >60 mL/min/1.73m2 >90   Calcium Latest Ref Range: 8.5 - 10.1 mg/dL 8.8   Anion Gap Latest Ref Range: 3 - 14 mmol/L 8   Albumin Latest Ref Range: 3.4 - 5.0 g/dL 4.0   Protein Total Latest Ref Range: 6.8 - 8.8 g/dL 8.0   Bilirubin Total Latest Ref Range: 0.2 - 1.3 mg/dL 1.1   Alkaline Phosphatase Latest Ref Range: 40 - 150 U/L 108   ALT Latest Ref Range: 0 - 70 U/L 43   AST Latest Ref Range: 0 - 45 U/L 40   Glucose Latest Ref Range: 70 - 99 mg/dL 81   Hemoglobin Latest Ref Range: 13.3 - 17.7 g/dL 10.5 (L)   Hematocrit Latest Ref Range: 40.0 - 53.0 % 31.0 (L)   % Retic Latest Ref Range: 0.5 - 2.0 % 5.8 (H)   Absolute Retic Latest Ref Range: 0.025 - 0.095 10e6/uL 0.207 (H)       Assessment:   Ricky Pak is a 26 year old male with HbSS Disease and a history of macular infarct who recently moved back to [place] for a second time for work. He has been here for 2.5 months and will be moving again in about a month. He continues to do well  and has not missed any apheresis appointments. I told him that we would work with him once he finds out where he is going next. We can do some light work to make sure that we get all of his plan of care and information to the next provider in the region. Hard to know where to send it just yet, but I will do my best to connect with anybody I know or it the very least, to get his information to the clinic nearest to him as early as possible.    Plan:  1) Labs: CBC, retic, BMP  2) Referral: none   3) Follow up: PRN if he returns to the area    I spent a total of 22 minutes during the video visit and documentation along with lab review on the day of the appointment      Manny Denson MD  Hematologist  Division of Hematology, Oncology, and Transplantation  [facility]  [facility]  Pager: [phone]

## 2022-02-11 NOTE — LETTER
2/11/2022         RE: Ricky Pak  [address] [id number]  [facility] [address]      Adult Sickle Cell Outpatient Clinic Note      Ricky is a 26 year old who is being evaluated via a billable video visit.      How would you like to obtain your AVS? OpinewsTV  If the video visit is dropped, the invitation should be resent by: Text to cell phone: [phone]  Will anyone else be joining your video visit? No      Video-Visit Details    Type of service:  Video Visit  Video Start Time:2:59 pm  Video End Time:3:08 pm    Originating Location (pt. Location): Other apheresis clinic    Distant Location (provider location):  Home    Platform used for Video Visit: Savannah García is a 26 year old who is being evaluated via a billable video visit.            Date of Visit: 02/11/2022    Ricky Pak is a 26 year old male who is being seen virtually for SCD care. He intermittently gets care in our system when his work schedules him in [place]. He started back here in December 2021 for a 3 month work stint.    Interval History  Ricky has been here for 2.5 months and he has been very happy with his time here. He will be in [place], in [place], for the next month and then he will get relocated by his job. He does not know quite yet where he is going. He expects to hear within the next week or so. His hope is to go out west somewhere, perhaps [place] or [facility], but he is not sure where that will be it. He will let us know via OpinewsTV once he knows. He has not had any issues with a pheresis this time. He is explicitly shared how Smead the transition was coming back here and that our system has been very easy to work with, perhaps the easiest he has had in a long while. He dressed that if he were to have an issue, we would be able to manage it. He has not felt like that in every location, for example if he had a transfusion reaction. He has not had any recent illnesses. No new eye pain. No other pain crises. He did remark  on how cold it is up in [place], even compared to the [facility] closer to where he was last time. Thus, he was very happy did not have a cold-induced crisis so far.    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 He was on monthly transfusions from 0474-7362 with chelation and in 2013 he was switched to peripheral RBC exchange which has kept him in great health since then. He no longer needs chelation and is able to keep his HbS ~30% or below with regularity. He grew up in [place] and has spent most of his life in the [facility]. 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 He just got sent to [facility] and moved here one week ago. He denies regular pain and has not had a pain crisis since he was a teenager. He has kept up with his exchange transfusions and feels quite healthy overall. He regained the vision after having a macular infarct at age 10 and has no further neurologic symptoms since. He does not take any regular medications and has not had any opioids in many years either. He has no complaints today. He met with the apheresis team before this visit and is squared away to be maintained on his exchange schedule here.    Sickle Cell Disease Comprehensive Checklist    Bone Health/Avascular Necrosis Screening/Symptoms (each visit): 11/6/2020, none    Leg Ulcer evaluation (every visit): 11/6/2020, none    Hypertension (every visit): 11/6/2020, none    Last ophthalmologic exam: annual, timing unclear    Last urinalysis for microalbuminuria/CKD (annually): 11/6/2020, negative    Last pulmonary evaluation (asthma, WILFREDO, pulm HTN): unknown    Stroke/silent cerebral infarct Hx (Y/N): macular infarct ~2006,  vision now normalized    Last PCP Visit: just moved to [place]    Vaccines:   Reported up to date. Flu shot (2020-21) from work    Plan last reviewed with patient: 12/20/21    Patient background: 23 yo male who recently moved from [place], late 2020. 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 No children or significant others    Sickle Cell Disease History  Primary Hematologist: Janiya  Genotype: SS  Acute Pain Crisis Treatment: (Opioid-naive)    ER/Acute Care/Infusion Clinic:   o Morphine 1 mg IVP/SC Q1H X 3 doses  o Other: Benadryl PO and Zofran 8 mg IV    Inpatient:  o Opioid: Morphine 1 mg IV Q1H PRN until PCA starts  o PCA plan:  - PCA button dose: Morphine 1 mg  - Lockout: 20 minutes  - Continuous Infusion: consider 0.5 mg/hr  o Other Medications: Benadryl, Zofran  o Supportive Care: Docusate, Senna  Chronic Pain Medications:    none  Baseline Hemoglobin: 12 mg/dl  Hydroxyurea use: no  H/O blood transfusions: Yes (partial exchange since 2013, full transfusion protocol 2006-13, now fully chelated)    H/O Transfusion Reactions: no    Antibodies: none known  H/O Acute Chest Syndrome: none    Last episode: n/a    ICU/intubation: no  H/O Stroke: Yes (macular infarct ~2006, vision normalized)  H/O VTE: no  H/O Cholecystectomy or Splenectomy: Cholecystectomy (2011)  H/O Asthma, Pulm HTN, AVN, Leg Ulcers, Nephropathy, Retinopathy, etc: none    Review Of Systems:  General: Good energy and appetite. No fevers. No concerns for pain.   HEENT: Denies concerns with vision or hearing.  No yellowing of the whites of eyes.  Respiratory: No SOB or orthopnea. No cough. No wheezing.   Cardiovascular: No chest pain, dizziness  nor palpitations.   Endocrine: No hot/cold intolerance. No increase thirst or urination.   GI: No n/v/d/d nor abdominal pain. No splenic pain  : No difficulty with urination. No hematuria.   Skin: No current rashes, bruises, petechiae or other skin  "lesions noted.   Neuro: No weakness or numbness. No HA.  MSK: No change in ROM or function.     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       Past Surgical History:  Past Surgical History:   Procedure Laterality Date     CHOLECYSTECTOMY N/A 2011     LIVER BIOPSY N/A 2007    iron overload monitoring     LIVER BIOPSY N/A 2008    monitoring for iron overload       Past Family History:   Family History   Problem Relation Age of Onset     Sickle Cell Trait Mother      Sickle Cell Trait Father      No Known Problems Sister      No Known Problems Sister      Sickle Cell Trait Brother      Breast Cancer Maternal Grandmother      Breast Cancer Maternal Great-Grandmother        Social History:   Social History     Socioeconomic History     Marital status: Single     Spouse name: Not on file     Number of children: Not on file     Years of education: Not on file     Highest education level: Not on file   Occupational History     Not on file   Tobacco Use     Smoking status: Never Smoker     Smokeless tobacco: Never Used   Substance and Sexual Activity     Alcohol use: Yes     Alcohol/week: 2.0 standard drinks     Types: 2 Standard drinks or equivalent per week     Comment: \"A few beers every other weekend.\"     Drug use: Never     Sexual activity: Not on file   Other Topics Concern     Not on file   Social History Narrative    Recently moved to [place]. 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     Social Determinants of Health     Financial Resource Strain: Not on file   Food Insecurity: Not on file   Transportation Needs: Not on file   Physical Activity: Not on file   Stress: Not " on file   Social Connections: Not on file   Intimate Partner Violence: Not on file   Housing Stability: Not on file       Current Medications:    Current Outpatient Medications   Medication     acetaminophen-codeine (TYLENOL W/CODEINE #3) 300-30 MG per tablet     HYDROcodone-acetaminophen (NORCO) 5-325 MG tablet     No current facility-administered medications for this visit.     Facility-Administered Medications Ordered in Other Visits   Medication     Anticoagulant Citrate Dextrose Formula A   (Apheresis Center)           Physical Exam:  There were no vitals taken for this visit. (audio on Amwell, unable to get video)    No exam  .     Labs:  Results for RICKY PAK (MRN [id number]) as of 2/11/2022 15:18   Ref. Range 2/11/2022 12:57   Sodium Latest Ref Range: 133 - 144 mmol/L 140   Potassium Latest Ref Range: 3.4 - 5.3 mmol/L 3.8   Chloride Latest Ref Range: 94 - 109 mmol/L 107   Carbon Dioxide Latest Ref Range: 20 - 32 mmol/L 25   Urea Nitrogen Latest Ref Range: 7 - 30 mg/dL 12   Creatinine Latest Ref Range: 0.66 - 1.25 mg/dL 0.79   GFR Estimate Latest Ref Range: >60 mL/min/1.73m2 >90   Calcium Latest Ref Range: 8.5 - 10.1 mg/dL 8.8   Anion Gap Latest Ref Range: 3 - 14 mmol/L 8   Albumin Latest Ref Range: 3.4 - 5.0 g/dL 4.0   Protein Total Latest Ref Range: 6.8 - 8.8 g/dL 8.0   Bilirubin Total Latest Ref Range: 0.2 - 1.3 mg/dL 1.1   Alkaline Phosphatase Latest Ref Range: 40 - 150 U/L 108   ALT Latest Ref Range: 0 - 70 U/L 43   AST Latest Ref Range: 0 - 45 U/L 40   Glucose Latest Ref Range: 70 - 99 mg/dL 81   Hemoglobin Latest Ref Range: 13.3 - 17.7 g/dL 10.5 (L)   Hematocrit Latest Ref Range: 40.0 - 53.0 % 31.0 (L)   % Retic Latest Ref Range: 0.5 - 2.0 % 5.8 (H)   Absolute Retic Latest Ref Range: 0.025 - 0.095 10e6/uL 0.207 (H)       Assessment:   Ricky Pak is a 26 year old male with HbSS Disease and a history of macular infarct who recently moved back to [place] for a second time for work. He has been here  for 2.5 months and will be moving again in about a month. He continues to do well and has not missed any apheresis appointments. I told him that we would work with him once he finds out where he is going next. We can do some light work to make sure that we get all of his plan of care and information to the next provider in the region. Hard to know where to send it just yet, but I will do my best to connect with anybody I know or it the very least, to get his information to the clinic nearest to him as early as possible.    Plan:  1) Labs: CBC, retic, BMP  2) Referral: none   3) Follow up: PRN if he returns to the area    I spent a total of 22 minutes during the video visit and documentation along with lab review on the day of the appointment      Manny Denson MD

## 2022-02-11 NOTE — LETTER
2/11/2022         RE: Ricky Pak  [address] [address]  [facility] [address]        Dear Colleague,    Thank you for referring your patient, Ricky Pak, to the [facility]. Please see a copy of my visit note below.    Adult Sickle Cell Outpatient Clinic Note      Ricky is a 26 year old who is being evaluated via a billable video visit.      How would you like to obtain your AVS? LocalBanyahart  If the video visit is dropped, the invitation should be resent by: Text to cell phone: [phone]  Will anyone else be joining your video visit? No      Video-Visit Details    Type of service:  Video Visit  Video Start Time:2:59 pm  Video End Time:3:08 pm    Originating Location (pt. Location): Other apheresis clinic    Distant Location (provider location):  Home    Platform used for Video Visit: Savannah García is a 26 year old who is being evaluated via a billable video visit.            Date of Visit: 02/11/2022    Ricky Pak is a 26 year old male who is being seen virtually for SCD care. He intermittently gets care in our system when his work schedules him in [place]. He started back here in December 2021 for a 3 month work stint.    Interval History  Ricky has been here for 2.5 months and he has been very happy with his time here. He will be in [place], in [place], for the next month and then he will get relocated by his job. He does not know quite yet where he is going. He expects to hear within the next week or so. His hope is to go out west somewhere, perhaps [place] or [facility], but he is not sure where that will be it. He will let us know via [employer] once he knows. He has not had any issues with a pheresis this time. He is explicitly shared how Smead the transition was coming back here and that our system has been very easy to work with, perhaps the easiest he has had in a long while. He dressed that if he were to have an issue, we would be able to manage it. He has not  felt like that in every location, for example if he had a transfusion reaction. He has not had any recent illnesses. No new eye pain. No other pain crises. He did remark on how cold it is up in [place], even compared to the [facility] closer to where he was last time. Thus, he was very happy did not have a cold-induced crisis so far.    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 He was on monthly transfusions from 4314-7662 with chelation and in 2013 he was switched to peripheral RBC exchange which has kept him in great health since then. He no longer needs chelation and is able to keep his HbS ~30% or below with regularity. He grew up in [place] and has spent most of his life in the [facility]. 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 He just got sent to [facility] and moved here one week ago. He denies regular pain and has not had a pain crisis since he was a teenager. He has kept up with his exchange transfusions and feels quite healthy overall. He regained the vision after having a macular infarct at age 10 and has no further neurologic symptoms since. He does not take any regular medications and has not had any opioids in many years either. He has no complaints today. He met with the apheresis team before this visit and is squared away to be maintained on his exchange schedule here.    Sickle Cell Disease Comprehensive Checklist    Bone Health/Avascular Necrosis Screening/Symptoms (each visit): 11/6/2020, none    Leg Ulcer evaluation (every visit): 11/6/2020, none    Hypertension (every visit): 11/6/2020, none    Last ophthalmologic exam: annual, timing unclear    Last urinalysis for  microalbuminuria/CKD (annually): 11/6/2020, negative    Last pulmonary evaluation (asthma, WILFREDO, pulm HTN): unknown    Stroke/silent cerebral infarct Hx (Y/N): macular infarct ~2006, vision now normalized    Last PCP Visit: just moved to [place]    Vaccines:   Reported up to date. Flu shot (2020-21) from work    Plan last reviewed with patient: 12/20/21    Patient background: 23 yo male who recently moved from [place], late 2020. 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 No children or significant others    Sickle Cell Disease History  Primary Hematologist: Janiya  Genotype: SS  Acute Pain Crisis Treatment: (Opioid-naive)    ER/Acute Care/Infusion Clinic:   o Morphine 1 mg IVP/SC Q1H X 3 doses  o Other: Benadryl PO and Zofran 8 mg IV    Inpatient:  o Opioid: Morphine 1 mg IV Q1H PRN until PCA starts  o PCA plan:  - PCA button dose: Morphine 1 mg  - Lockout: 20 minutes  - Continuous Infusion: consider 0.5 mg/hr  o Other Medications: Benadryl, Zofran  o Supportive Care: Docusate, Senna  Chronic Pain Medications:    none  Baseline Hemoglobin: 12 mg/dl  Hydroxyurea use: no  H/O blood transfusions: Yes (partial exchange since 2013, full transfusion protocol 2006-13, now fully chelated)    H/O Transfusion Reactions: no    Antibodies: none known  H/O Acute Chest Syndrome: none    Last episode: n/a    ICU/intubation: no  H/O Stroke: Yes (macular infarct ~2006, vision normalized)  H/O VTE: no  H/O Cholecystectomy or Splenectomy: Cholecystectomy (2011)  H/O Asthma, Pulm HTN, AVN, Leg Ulcers, Nephropathy, Retinopathy, etc: none    Review Of Systems:  General: Good energy and appetite. No fevers. No concerns for pain.   HEENT: Denies concerns with vision or hearing.  No yellowing of the whites of eyes.  Respiratory: No SOB or orthopnea. No cough. No wheezing.   Cardiovascular: No chest pain, dizziness  nor palpitations.   Endocrine: No hot/cold intolerance. No increase  "thirst or urination.   GI: No n/v/d/d nor abdominal pain. No splenic pain  : No difficulty with urination. No hematuria.   Skin: No current rashes, bruises, petechiae or other skin lesions noted.   Neuro: No weakness or numbness. No HA.  MSK: No change in ROM or function.     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       Past Surgical History:  Past Surgical History:   Procedure Laterality Date     CHOLECYSTECTOMY N/A 2011     LIVER BIOPSY N/A 2007    iron overload monitoring     LIVER BIOPSY N/A 2008    monitoring for iron overload       Past Family History:   Family History   Problem Relation Age of Onset     Sickle Cell Trait Mother      Sickle Cell Trait Father      No Known Problems Sister      No Known Problems Sister      Sickle Cell Trait Brother      Breast Cancer Maternal Grandmother      Breast Cancer Maternal Great-Grandmother        Social History:   Social History     Socioeconomic History     Marital status: Single     Spouse name: Not on file     Number of children: Not on file     Years of education: Not on file     Highest education level: Not on file   Occupational History     Not on file   Tobacco Use     Smoking status: Never Smoker     Smokeless tobacco: Never Used   Substance and Sexual Activity     Alcohol use: Yes     Alcohol/week: 2.0 standard drinks     Types: 2 Standard drinks or equivalent per week     Comment: \"A few beers every other weekend.\"     Drug use: Never     Sexual activity: Not on file   Other Topics Concern     Not on file   Social History Narrative    Recently moved to [place]. 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 "     Social Determinants of Health     Financial Resource Strain: Not on file   Food Insecurity: Not on file   Transportation Needs: Not on file   Physical Activity: Not on file   Stress: Not on file   Social Connections: Not on file   Intimate Partner Violence: Not on file   Housing Stability: Not on file       Current Medications:    Current Outpatient Medications   Medication     acetaminophen-codeine (TYLENOL W/CODEINE #3) 300-30 MG per tablet     HYDROcodone-acetaminophen (NORCO) 5-325 MG tablet     No current facility-administered medications for this visit.     Facility-Administered Medications Ordered in Other Visits   Medication     Anticoagulant Citrate Dextrose Formula A   (Apheresis Center)           Physical Exam:  There were no vitals taken for this visit. (audio on Amwell, unable to get video)    No exam  .     Labs:  Results for OLYA CONROY (MRN [id number]) as of 2/11/2022 15:18   Ref. Range 2/11/2022 12:57   Sodium Latest Ref Range: 133 - 144 mmol/L 140   Potassium Latest Ref Range: 3.4 - 5.3 mmol/L 3.8   Chloride Latest Ref Range: 94 - 109 mmol/L 107   Carbon Dioxide Latest Ref Range: 20 - 32 mmol/L 25   Urea Nitrogen Latest Ref Range: 7 - 30 mg/dL 12   Creatinine Latest Ref Range: 0.66 - 1.25 mg/dL 0.79   GFR Estimate Latest Ref Range: >60 mL/min/1.73m2 >90   Calcium Latest Ref Range: 8.5 - 10.1 mg/dL 8.8   Anion Gap Latest Ref Range: 3 - 14 mmol/L 8   Albumin Latest Ref Range: 3.4 - 5.0 g/dL 4.0   Protein Total Latest Ref Range: 6.8 - 8.8 g/dL 8.0   Bilirubin Total Latest Ref Range: 0.2 - 1.3 mg/dL 1.1   Alkaline Phosphatase Latest Ref Range: 40 - 150 U/L 108   ALT Latest Ref Range: 0 - 70 U/L 43   AST Latest Ref Range: 0 - 45 U/L 40   Glucose Latest Ref Range: 70 - 99 mg/dL 81   Hemoglobin Latest Ref Range: 13.3 - 17.7 g/dL 10.5 (L)   Hematocrit Latest Ref Range: 40.0 - 53.0 % 31.0 (L)   % Retic Latest Ref Range: 0.5 - 2.0 % 5.8 (H)   Absolute Retic Latest Ref Range: 0.025 - 0.095 10e6/uL  0.207 (H)       Assessment:   Ricky Pak is a 26 year old male with HbSS Disease and a history of macular infarct who recently moved back to [place] for a second time for work. He has been here for 2.5 months and will be moving again in about a month. He continues to do well and has not missed any apheresis appointments. I told him that we would work with him once he finds out where he is going next. We can do some light work to make sure that we get all of his plan of care and information to the next provider in the region. Hard to know where to send it just yet, but I will do my best to connect with anybody I know or it the very least, to get his information to the clinic nearest to him as early as possible.    Plan:  1) Labs: CBC, retic, BMP  2) Referral: none   3) Follow up: PRN if he returns to the area    I spent a total of 22 minutes during the video visit and documentation along with lab review on the day of the appointment        Again, thank you for allowing me to participate in the care of your patient.      Sincerely,    Manny Denson MD

## 2022-02-13 LAB
HGB S BLD QL: NORMAL
HGB S BLD QL: NORMAL

## 2022-03-09 ENCOUNTER — LAB (OUTPATIENT)
Dept: LAB | Facility: CLINIC | Age: 27
End: 2022-03-09
Attending: PEDIATRICS
Payer: COMMERCIAL

## 2022-03-09 DIAGNOSIS — D57.1 SICKLE CELL DISEASE WITHOUT CRISIS (H): ICD-10-CM

## 2022-03-09 LAB
ABO/RH(D): NORMAL
ANTIBODY SCREEN: NEGATIVE
BASOPHILS # BLD AUTO: 0.2 10E3/UL (ref 0–0.2)
BASOPHILS NFR BLD AUTO: 2 %
EOSINOPHIL # BLD AUTO: 0.4 10E3/UL (ref 0–0.7)
EOSINOPHIL NFR BLD AUTO: 5 %
ERYTHROCYTE [DISTWIDTH] IN BLOOD BY AUTOMATED COUNT: 19.2 % (ref 10–15)
HCT VFR BLD AUTO: 33.7 % (ref 40–53)
HGB BLD-MCNC: 11 G/DL (ref 13.3–17.7)
IMM GRANULOCYTES # BLD: 0 10E3/UL
IMM GRANULOCYTES NFR BLD: 0 %
LYMPHOCYTES # BLD AUTO: 2.2 10E3/UL (ref 0.8–5.3)
LYMPHOCYTES NFR BLD AUTO: 25 %
MCH RBC QN AUTO: 26.4 PG (ref 26.5–33)
MCHC RBC AUTO-ENTMCNC: 32.6 G/DL (ref 31.5–36.5)
MCV RBC AUTO: 81 FL (ref 78–100)
MONOCYTES # BLD AUTO: 0.8 10E3/UL (ref 0–1.3)
MONOCYTES NFR BLD AUTO: 9 %
NEUTROPHILS # BLD AUTO: 5.3 10E3/UL (ref 1.6–8.3)
NEUTROPHILS NFR BLD AUTO: 59 %
NRBC # BLD AUTO: 0.1 10E3/UL
NRBC BLD AUTO-RTO: 1 /100
PLATELET # BLD AUTO: 549 10E3/UL (ref 150–450)
RBC # BLD AUTO: 4.16 10E6/UL (ref 4.4–5.9)
RETICS # AUTO: 0.22 10E6/UL (ref 0.03–0.1)
RETICS/RBC NFR AUTO: 5.4 % (ref 0.5–2)
SPECIMEN EXPIRATION DATE: NORMAL
WBC # BLD AUTO: 8.9 10E3/UL (ref 4–11)

## 2022-03-09 PROCEDURE — 86901 BLOOD TYPING SEROLOGIC RH(D): CPT

## 2022-03-09 PROCEDURE — 85025 COMPLETE CBC W/AUTO DIFF WBC: CPT

## 2022-03-09 PROCEDURE — 83021 HEMOGLOBIN CHROMOTOGRAPHY: CPT

## 2022-03-09 PROCEDURE — 86850 RBC ANTIBODY SCREEN: CPT

## 2022-03-09 PROCEDURE — 85045 AUTOMATED RETICULOCYTE COUNT: CPT

## 2022-03-09 PROCEDURE — 86923 COMPATIBILITY TEST ELECTRIC: CPT | Mod: 59

## 2022-03-09 PROCEDURE — 36415 COLL VENOUS BLD VENIPUNCTURE: CPT

## 2022-03-09 NOTE — NURSING NOTE
Chief Complaint   Patient presents with     Labs Only     Labs drawn via  by RN in lab       Labs collected from venipuncture by RN.     Sammi Teixeira RN

## 2022-03-10 RX ORDER — HYDROXYZINE HYDROCHLORIDE 25 MG/1
25 TABLET, FILM COATED ORAL ONCE
Status: CANCELLED | OUTPATIENT
Start: 2022-03-10

## 2022-03-11 ENCOUNTER — HOSPITAL ENCOUNTER (OUTPATIENT)
Dept: LAB | Facility: CLINIC | Age: 27
Discharge: HOME | End: 2022-03-11
Attending: PEDIATRICS | Admitting: PATHOLOGY
Payer: COMMERCIAL

## 2022-03-11 VITALS
DIASTOLIC BLOOD PRESSURE: 65 MMHG | TEMPERATURE: 98.7 F | RESPIRATION RATE: 16 BRPM | SYSTOLIC BLOOD PRESSURE: 143 MMHG | HEART RATE: 82 BPM | WEIGHT: 236.99 LBS | BODY MASS INDEX: 32.45 KG/M2

## 2022-03-11 LAB
HCT VFR BLD AUTO: 30.4 % (ref 40–53)
HCT VFR BLD AUTO: 33.7 % (ref 40–53)
HGB BLD-MCNC: 10 G/DL (ref 13.3–17.7)
HGB BLD-MCNC: 11.1 G/DL (ref 13.3–17.7)
HGB S BLD QL: NORMAL

## 2022-03-11 PROCEDURE — 36415 COLL VENOUS BLD VENIPUNCTURE: CPT | Performed by: PATHOLOGY

## 2022-03-11 PROCEDURE — P9016 RBC LEUKOCYTES REDUCED: HCPCS

## 2022-03-11 PROCEDURE — 999N000127 HC STATISTIC PERIPHERAL IV START W US GUIDANCE

## 2022-03-11 PROCEDURE — 83021 HEMOGLOBIN CHROMOTOGRAPHY: CPT | Performed by: PATHOLOGY

## 2022-03-11 PROCEDURE — 85014 HEMATOCRIT: CPT | Performed by: PATHOLOGY

## 2022-03-11 PROCEDURE — 250N000013 HC RX MED GY IP 250 OP 250 PS 637: Performed by: PATHOLOGY

## 2022-03-11 PROCEDURE — 36512 APHERESIS RBC: CPT

## 2022-03-11 RX ORDER — HYDROXYZINE HYDROCHLORIDE 25 MG/1
25 TABLET, FILM COATED ORAL ONCE
Status: COMPLETED | OUTPATIENT
Start: 2022-03-11 | End: 2022-03-11

## 2022-03-11 RX ADMIN — HYDROXYZINE HYDROCHLORIDE 25 MG: 25 TABLET, FILM COATED ORAL at 12:28

## 2022-03-11 NOTE — PROCEDURES
Laboratory Medicine and Pathology  Transfusion Medicine - Apheresis Procedure    Ricky Pak MRN# [id number]   YOB: 1995 Age: 26 year old        Reason for consult: Sickle cell anemia           Assessment and Plan:   The patient is a 26 year old male with sickle cell anemia. He underwent maintenance red blood cell exchange.  He tolerated the procedure well.          Chief Complaint:   No specific concerns         History of Present Illness:   The patient is a 26 year old male with sickle cell anemia.  His past medical history includes gallstones S/P cholecystectomy, CVA with macular infarct, pain crises, and iron overload secondary to repeated blood transfusions. He began RBC exchanges around 2013. Since starting exchanges he has done well. His job requires him to travel so he gets exchanges nearby where he is living. He currently gets exchanges approximately every 4-8 weeks.  [place] in [place], but will be moving to [place] next. He reports he has been doing well.           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           Past Surgical History:     Past Surgical History:   Procedure Laterality Date     CHOLECYSTECTOMY N/A 2011     LIVER BIOPSY N/A 2007    iron overload monitoring     LIVER BIOPSY N/A 2008    monitoring for iron overload          Social History:   Works as a medical sterilization processor           Immunizations:   Has received the COVID vaccine          Allergies:     Allergies   Allergen Reactions     Diphenhydramine Hives   He reports the reaction is a rash predominantly on his face, rather than classic urticaria           Medications:     Current Outpatient Medications   Medication Sig     acetaminophen-codeine (TYLENOL W/CODEINE #3) 300-30 MG per tablet Take 1-2 tablets by mouth as needed     HYDROcodone-acetaminophen (NORCO) 5-325 MG tablet Take 1-2 tablets by mouth as needed          Review of Systems:   Denied fever, chills, cough, shortness of breath, nausea, vomiting, diarrhea, pain         Exam:   /77 P 92 T 98 RR 16  Resting but easily awakens, no apparent distress  Breathing appears comfortable  No jaundice or scleral icterus  Moves extremities  PIV access          Data:     Results for orders placed or performed during the hospital encounter of 03/11/22 (from the past 24 hour(s))   Hemoglobin and hematocrit   Result Value Ref Range    Hemoglobin 10.0 (L) 13.3 - 17.7 g/dL    Hematocrit 30.4 (L) 40.0 - 53.0 %   Hemoglobin and hematocrit   Result Value Ref Range    Hemoglobin 11.1 (L) 13.3 - 17.7 g/dL    Hematocrit 33.7 (L) 40.0 - 53.0 %          Procedure Summary:   A red blood cell exchange was performed with a Spectra Optia cell separator.  The vascular access was peripheral IV.  ACD-A was used for anticoagulation. The replacement fluid was 8 red blood cell units.  The intended volume was 3000 mL; however, the blood bank had only obtained 8 units.  The patient's vital signs were stable throughout.  The patient tolerated the procedure well.    ATTESTATION STATEMENT:  This patient has been seen and evaluated by me directly, Charo Ramirez MD, PhD.    Charo Ramirez M.D., Ph.D.  Attending Physician  Division of Transfusion Medicine  Department of Laboratory [place] [address]

## 2022-03-11 NOTE — DISCHARGE INSTRUCTIONS
Red blood cell exchange:   If you received blood products (plasma or red blood cells) as part of your treatment, you need to be aware that transfusion reactions can occur up to several hours after they have been given to you.  Call your physician if you experience any symptoms in the next 48 hours, including breathing problems, rash, itching, hives, nausea or vomiting, fever or chills, blood in your urine or stools, or joint pain.  Please inform the Transfusion Medicine Physician by calling [phone] and asking for the physician on call  .Apheresis Blood Donor Center Post Instructions  You may feel tired after your procedure today.   Please call your doctor if you have:  bleeding that doesn t stop, fever, pain where a needle or tube (catheter) was placed, seizures, trouble breathing, red urine, nausea or vomiting, other health concerns.     If your symptoms are severe, call [phone].  If your veins were used, keep the bandages on for 2-4 hours.  Avoid heavy lifting with your arms.  If bleeding occurs from these sites, apply firm pressure for 5-10 minutes.  Call your physician if bleeding continues.    The Apheresis/Blood Donor Center is open Monday-Friday 7:30 a.m. to 5 p.m.  The phone number is [phone].  A Transfusion Medicine physician can be reached after 5:00 p.m. weekdays and on weekends /Holidays by calling [phone], and asking for the physician on call.

## 2022-03-14 LAB — HGB S BLD QL: NORMAL

## 2022-03-27 ENCOUNTER — HEALTH MAINTENANCE LETTER (OUTPATIENT)
Age: 27
End: 2022-03-27

## 2022-06-19 DIAGNOSIS — D57.1 HB-SS DISEASE WITHOUT CRISIS (H): Primary | ICD-10-CM

## 2022-06-20 ENCOUNTER — LAB (OUTPATIENT)
Dept: LAB | Facility: CLINIC | Age: 27
End: 2022-06-20
Attending: PEDIATRICS
Payer: COMMERCIAL

## 2022-06-20 DIAGNOSIS — D57.1 SICKLE CELL DISEASE WITHOUT CRISIS (H): ICD-10-CM

## 2022-06-20 LAB
ABO/RH(D): NORMAL
ANTIBODY SCREEN: NEGATIVE
BASOPHILS # BLD AUTO: 0.1 10E3/UL (ref 0–0.2)
BASOPHILS NFR BLD AUTO: 1 %
EOSINOPHIL # BLD AUTO: 2.1 10E3/UL (ref 0–0.7)
EOSINOPHIL NFR BLD AUTO: 16 %
ERYTHROCYTE [DISTWIDTH] IN BLOOD BY AUTOMATED COUNT: 22.8 % (ref 10–15)
HCT VFR BLD AUTO: 28.2 % (ref 40–53)
HGB BLD-MCNC: 9.9 G/DL (ref 13.3–17.7)
IMM GRANULOCYTES # BLD: 0.1 10E3/UL
IMM GRANULOCYTES NFR BLD: 0 %
LYMPHOCYTES # BLD AUTO: 3 10E3/UL (ref 0.8–5.3)
LYMPHOCYTES NFR BLD AUTO: 24 %
MCH RBC QN AUTO: 28.8 PG (ref 26.5–33)
MCHC RBC AUTO-ENTMCNC: 35.1 G/DL (ref 31.5–36.5)
MCV RBC AUTO: 82 FL (ref 78–100)
MONOCYTES # BLD AUTO: 1.4 10E3/UL (ref 0–1.3)
MONOCYTES NFR BLD AUTO: 11 %
NEUTROPHILS # BLD AUTO: 6.2 10E3/UL (ref 1.6–8.3)
NEUTROPHILS NFR BLD AUTO: 48 %
NRBC # BLD AUTO: 0.2 10E3/UL
NRBC BLD AUTO-RTO: 2 /100
PLATELET # BLD AUTO: 395 10E3/UL (ref 150–450)
RBC # BLD AUTO: 3.44 10E6/UL (ref 4.4–5.9)
SPECIMEN EXPIRATION DATE: NORMAL
WBC # BLD AUTO: 12.9 10E3/UL (ref 4–11)

## 2022-06-20 PROCEDURE — 85014 HEMATOCRIT: CPT

## 2022-06-20 PROCEDURE — 86923 COMPATIBILITY TEST ELECTRIC: CPT

## 2022-06-20 PROCEDURE — 36415 COLL VENOUS BLD VENIPUNCTURE: CPT

## 2022-06-20 PROCEDURE — 86850 RBC ANTIBODY SCREEN: CPT

## 2022-06-20 NOTE — NURSING NOTE
Chief Complaint   Patient presents with     Blood Draw     Labs drawn via  by RN in lab.      Dinora Booth RN

## 2022-06-21 RX ORDER — HYDROXYZINE HYDROCHLORIDE 25 MG/1
25 TABLET, FILM COATED ORAL ONCE
Status: CANCELLED | OUTPATIENT
Start: 2022-06-22

## 2022-06-22 ENCOUNTER — HOSPITAL ENCOUNTER (OUTPATIENT)
Dept: LAB | Facility: CLINIC | Age: 27
Discharge: HOME | End: 2022-06-22
Attending: PEDIATRICS | Admitting: PEDIATRICS
Payer: COMMERCIAL

## 2022-06-22 VITALS
DIASTOLIC BLOOD PRESSURE: 75 MMHG | RESPIRATION RATE: 16 BRPM | WEIGHT: 225.53 LBS | HEIGHT: 72 IN | BODY MASS INDEX: 30.55 KG/M2 | HEART RATE: 77 BPM | TEMPERATURE: 99 F | SYSTOLIC BLOOD PRESSURE: 122 MMHG

## 2022-06-22 LAB
HCT VFR BLD AUTO: 26.6 % (ref 40–53)
HCT VFR BLD AUTO: 27.8 % (ref 40–53)
HGB BLD-MCNC: 9.4 G/DL (ref 13.3–17.7)
HGB BLD-MCNC: 9.4 G/DL (ref 13.3–17.7)

## 2022-06-22 PROCEDURE — 85014 HEMATOCRIT: CPT

## 2022-06-22 PROCEDURE — 36512 APHERESIS RBC: CPT

## 2022-06-22 PROCEDURE — 36415 COLL VENOUS BLD VENIPUNCTURE: CPT

## 2022-06-22 PROCEDURE — 250N000013 HC RX MED GY IP 250 OP 250 PS 637

## 2022-06-22 PROCEDURE — 999N000248 HC STATISTIC IV INSERT WITH US BY RN

## 2022-06-22 PROCEDURE — 83021 HEMOGLOBIN CHROMOTOGRAPHY: CPT

## 2022-06-22 PROCEDURE — 250N000009 HC RX 250

## 2022-06-22 PROCEDURE — 85018 HEMOGLOBIN: CPT

## 2022-06-22 PROCEDURE — P9016 RBC LEUKOCYTES REDUCED: HCPCS

## 2022-06-22 RX ORDER — HYDROXYZINE HYDROCHLORIDE 25 MG/1
25 TABLET, FILM COATED ORAL ONCE
Status: COMPLETED | OUTPATIENT
Start: 2022-06-22 | End: 2022-06-22

## 2022-06-22 RX ADMIN — ANTICOAGULANT CITRATE DEXTROSE SOLUTION FORMULA A 575 ML: 12.25; 11; 3.65 SOLUTION INTRAVENOUS at 09:49

## 2022-06-22 RX ADMIN — HYDROXYZINE HYDROCHLORIDE 25 MG: 25 TABLET, FILM COATED ORAL at 08:29

## 2022-06-22 NOTE — PROCEDURES
Laboratory Medicine and Pathology  Transfusion Medicine - Apheresis Procedure Note    Ricky Pak MRN# [id number]   YOB: 1995 Age: 26 year old   Date of Procedure: 6/22/2022    Procedure:  RBCx   Reason for Procedure: Sickle Cell Disease         Assessment and Plan:   Ricky Pak is a 26 year old male  with sickle cell anemia who underwent maintenance red blood cell exchange. Due to issues with diphenhydramine, Atarax was used as the premedication for Red Blood cell exchange. He tolerated the procedure well.      He has used peripheral veins in the past and these were used for access for the procedure.   Attestation:   This patient has been seen and evaluated by me, Spencer Harris. .        History of Present Illness   Ricky Pak is a 26 year old male  who presents for red blood cell exchange. His past medical history includes gallstones, status post cholecystectomy, and sickle cell anemia complicated by a CVA with macular infarct and iron overload secondary to repeated blood transfusions.     He began apheresis exchanges around 2013 following his issues with iron overload.  Per previous documentation, these exchanges have done an excellent job preventing sickle cell crises. He had a hypotensive episode following a recent  apheresis procedure here which was a depletion followed by an exchange. Therefore, we plan to proceed with an exchange alone.        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             Past Surgical History:     Past Surgical History:   Procedure Laterality Date     CHOLECYSTECTOMY N/A 2011     LIVER BIOPSY N/A 2007    iron overload monitoring      "LIVER BIOPSY N/A 2008    monitoring for iron overload              Social History:     Social History     Tobacco Use     Smoking status: Never Smoker     Smokeless tobacco: Never Used   Substance Use Topics     Alcohol use: Yes     Alcohol/week: 2.0 standard drinks     Types: 2 Standard drinks or equivalent per week     Comment: \"A few beers every other weekend.\"            Allergies:     Allergies   Allergen Reactions     Diphenhydramine Hives             Medications:     Current Outpatient Medications   Medication Sig     acetaminophen-codeine (TYLENOL W/CODEINE #3) 300-30 MG per tablet Take 1-2 tablets by mouth as needed     HYDROcodone-acetaminophen (NORCO) 5-325 MG tablet Take 1-2 tablets by mouth as needed     No current facility-administered medications for this encounter.             Abbreviated Physical Exam:   /75   Pulse 77   Temp 99  F (37.2  C) (Oral)   Resp 16   Ht 1.82 m (5' 11.65\")   Wt 102.3 kg (225 lb 8.5 oz)   BMI 30.88 kg/m    Patient Alert & Oriented and in No Acute Distress          Laboratory Data:   BMPNo lab results found in last 7 days.  CBC  Recent Labs   Lab 06/22/22  1107 06/22/22  0907 06/20/22  1220   WBC  --   --  12.9*   RBC  --   --  3.44*   HGB 9.4* 9.4* 9.9*   HCT 27.8* 26.6* 28.2*   MCV  --   --  82   MCH  --   --  28.8   MCHC  --   --  35.1   RDW  --   --  22.8*   PLT  --   --  395            Procedure Summary:   A red blood cell exchange was performed with a Spectra Optia cell separator.  The vascular access was peripheral IV.  ACD-A was used for anticoagulation. The replacement fluid was 10 red blood cell units.  The patient's vital signs were stable throughout.  The patient tolerated the procedure well  Attestation:   This patient has been seen and evaluated by me, Spencer Harris.   Spencer Harris MD   Division of Transfusion Medicine   Department [place] [address]   Pager: [phone]           "

## 2022-06-24 LAB
HGB S BLD QL: NORMAL
HGB S BLD QL: NORMAL

## 2022-07-11 ENCOUNTER — ONCOLOGY VISIT (OUTPATIENT)
Dept: ONCOLOGY | Facility: CLINIC | Age: 27
End: 2022-07-11
Attending: PEDIATRICS
Payer: COMMERCIAL

## 2022-07-11 ENCOUNTER — APPOINTMENT (OUTPATIENT)
Dept: LAB | Facility: CLINIC | Age: 27
End: 2022-07-11
Attending: PEDIATRICS
Payer: COMMERCIAL

## 2022-07-11 VITALS
WEIGHT: 229 LBS | DIASTOLIC BLOOD PRESSURE: 80 MMHG | OXYGEN SATURATION: 99 % | SYSTOLIC BLOOD PRESSURE: 120 MMHG | BODY MASS INDEX: 31.36 KG/M2 | RESPIRATION RATE: 18 BRPM | TEMPERATURE: 98.5 F | HEART RATE: 77 BPM

## 2022-07-11 DIAGNOSIS — Z86.73 HISTORY OF CVA (CEREBROVASCULAR ACCIDENT): Primary | ICD-10-CM

## 2022-07-11 DIAGNOSIS — D57.1 HB-SS DISEASE WITHOUT CRISIS (H): ICD-10-CM

## 2022-07-11 DIAGNOSIS — D57.1 SICKLE CELL DISEASE WITHOUT CRISIS (H): ICD-10-CM

## 2022-07-11 LAB
ABO/RH(D): NORMAL
ALBUMIN SERPL-MCNC: 4 G/DL (ref 3.4–5)
ALP SERPL-CCNC: 105 U/L (ref 40–150)
ALT SERPL W P-5'-P-CCNC: 71 U/L (ref 0–70)
ANION GAP SERPL CALCULATED.3IONS-SCNC: 7 MMOL/L (ref 3–14)
ANTIBODY SCREEN: NEGATIVE
AST SERPL W P-5'-P-CCNC: 55 U/L (ref 0–45)
BASOPHILS # BLD AUTO: 0.1 10E3/UL (ref 0–0.2)
BASOPHILS NFR BLD AUTO: 2 %
BILIRUB SERPL-MCNC: 1.3 MG/DL (ref 0.2–1.3)
BUN SERPL-MCNC: 8 MG/DL (ref 7–30)
CALCIUM SERPL-MCNC: 9.2 MG/DL (ref 8.5–10.1)
CHLORIDE BLD-SCNC: 106 MMOL/L (ref 94–109)
CO2 SERPL-SCNC: 26 MMOL/L (ref 20–32)
CREAT SERPL-MCNC: 0.62 MG/DL (ref 0.66–1.25)
EOSINOPHIL # BLD AUTO: 0.5 10E3/UL (ref 0–0.7)
EOSINOPHIL NFR BLD AUTO: 8 %
ERYTHROCYTE [DISTWIDTH] IN BLOOD BY AUTOMATED COUNT: 17.9 % (ref 10–15)
GFR SERPL CREATININE-BSD FRML MDRD: >90 ML/MIN/1.73M2
GLUCOSE BLD-MCNC: 94 MG/DL (ref 70–99)
HCT VFR BLD AUTO: 30.2 % (ref 40–53)
HGB BLD-MCNC: 10.3 G/DL (ref 13.3–17.7)
IMM GRANULOCYTES # BLD: 0 10E3/UL
IMM GRANULOCYTES NFR BLD: 0 %
LYMPHOCYTES # BLD AUTO: 1.8 10E3/UL (ref 0.8–5.3)
LYMPHOCYTES NFR BLD AUTO: 28 %
MCH RBC QN AUTO: 28.3 PG (ref 26.5–33)
MCHC RBC AUTO-ENTMCNC: 34.1 G/DL (ref 31.5–36.5)
MCV RBC AUTO: 83 FL (ref 78–100)
MONOCYTES # BLD AUTO: 0.8 10E3/UL (ref 0–1.3)
MONOCYTES NFR BLD AUTO: 12 %
NEUTROPHILS # BLD AUTO: 3.3 10E3/UL (ref 1.6–8.3)
NEUTROPHILS NFR BLD AUTO: 50 %
NRBC # BLD AUTO: 0.1 10E3/UL
NRBC BLD AUTO-RTO: 1 /100
PLATELET # BLD AUTO: 547 10E3/UL (ref 150–450)
POTASSIUM BLD-SCNC: 4.2 MMOL/L (ref 3.4–5.3)
PROT SERPL-MCNC: 8.2 G/DL (ref 6.8–8.8)
RBC # BLD AUTO: 3.64 10E6/UL (ref 4.4–5.9)
RETICS # AUTO: 0.22 10E6/UL (ref 0.03–0.1)
RETICS/RBC NFR AUTO: 6.1 % (ref 0.5–2)
SODIUM SERPL-SCNC: 139 MMOL/L (ref 133–144)
SPECIMEN EXPIRATION DATE: NORMAL
WBC # BLD AUTO: 6.5 10E3/UL (ref 4–11)

## 2022-07-11 PROCEDURE — 85045 AUTOMATED RETICULOCYTE COUNT: CPT | Performed by: PEDIATRICS

## 2022-07-11 PROCEDURE — 36415 COLL VENOUS BLD VENIPUNCTURE: CPT | Performed by: PEDIATRICS

## 2022-07-11 PROCEDURE — 99215 OFFICE O/P EST HI 40 MIN: CPT | Performed by: PEDIATRICS

## 2022-07-11 PROCEDURE — 83021 HEMOGLOBIN CHROMOTOGRAPHY: CPT | Performed by: PEDIATRICS

## 2022-07-11 PROCEDURE — 82306 VITAMIN D 25 HYDROXY: CPT | Performed by: PEDIATRICS

## 2022-07-11 PROCEDURE — 85025 COMPLETE CBC W/AUTO DIFF WBC: CPT | Performed by: PEDIATRICS

## 2022-07-11 PROCEDURE — G0463 HOSPITAL OUTPT CLINIC VISIT: HCPCS

## 2022-07-11 PROCEDURE — 86850 RBC ANTIBODY SCREEN: CPT | Performed by: PEDIATRICS

## 2022-07-11 PROCEDURE — 80053 COMPREHEN METABOLIC PANEL: CPT | Performed by: PEDIATRICS

## 2022-07-11 ASSESSMENT — PAIN SCALES - GENERAL: PAINLEVEL: NO PAIN (0)

## 2022-07-11 NOTE — LETTER
7/11/2022         RE: Ricky Pak  [address] [address]  [facility] [address]        Dear Colleague,    Thank you for referring your patient, Ricky Pak, to the [facility]. Please see a copy of my visit note below.    Adult Sickle Cell Outpatient Clinic Note        Date of Visit: 07/11/2022    Ricky Pak is a 26 year old male who is being seen as a follow up for SCD care. He intermittently gets care in our system when his work schedules him in [place]. He started back here in June 2022 for a 3 month work stint.     Interval History  Ricky was last here from Dec 2021 through March 2022. After that, he went to [place] but he was not able to get in for regular exchange transfusions so he did not get any for three months until getting back here 3 weeks ago. He did get into his exchange regimen back here without any issues. 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 He has had a good summer so far. A few more aches and pains since he was off exchanges but nothing that has warranted any emergency visits. His mom surprised him and came up to visit right after the 4th of July which was exciting for him. No other major health issues but he does want to get looped into a dentist and an opththalmologist (last seen in [place] in 2019).  He has not had any recent illnesses. No new eye pain. No other pain crises. He did remark on how cold it is up in [place], even compared to the [facility] closer to where he was last time. Thus, he was very happy did not have a cold-induced crisis so far.    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 He was on monthly transfusions from 2583-9511 with chelation and in 2013 he was switched to peripheral RBC exchange which has kept him in great health since then. He no  longer needs chelation and is able to keep his HbS ~30% or below with regularity. He grew up in [place] and has spent most of his life in the [facility]. 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 He just got sent to [facility] and moved here one week ago. He denies regular pain and has not had a pain crisis since he was a teenager. He has kept up with his exchange transfusions and feels quite healthy overall. He regained the vision after having a macular infarct at age 10 and has no further neurologic symptoms since. He does not take any regular medications and has not had any opioids in many years either. He has no complaints today. He met with the apheresis team before this visit and is squared away to be maintained on his exchange schedule here.    ---------------------------------------  Ricky Pak's Goals (discussed 07/11/2022 )    1-3 month goal:      6 month goal:  Dental, eye exam    12 month goal:      Disease-specific goal(s):  Maintain good exchange regimens despite moving about the country  ---------------------------------------      Sickle Cell Disease Comprehensive Checklist    Bone Health/Avascular Necrosis Screening/Symptoms (each visit): 11/6/2020, none    Leg Ulcer evaluation (every visit): 11/6/2020, none    Hypertension (every visit): 7/11/22    Last ophthalmologic exam: Due (last seen in 2019)    Last urinalysis for microalbuminuria/CKD (annually): Needs at next visit    Last pulmonary evaluation (asthma, WILFREDO, pulm HTN): unknown    Stroke/silent cerebral infarct Hx (Y/N): macular infarct ~2006, vision now normalized    Last PCP Visit: just moved to [place]    Vaccines:   Reported up to date. Flu shot (2021-22) from work    Plan last reviewed with patient: 12/20/21    Patient background: 25 yo male who is from [place], who  initially came to [place] in late 2020. 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 No children or significant others    Sickle Cell Disease History  Primary Hematologist: Janiya  Genotype: SS  Acute Pain Crisis Treatment: (Opioid-naive)    ER/Acute Care/Infusion Clinic:   o Morphine 1 mg IVP/SC Q1H X 3 doses  o Other: Benadryl PO and Zofran 8 mg IV    Inpatient:  o Opioid: Morphine 1 mg IV Q1H PRN until PCA starts  o PCA plan:  - PCA button dose: Morphine 1 mg  - Lockout: 20 minutes  - Continuous Infusion: consider 0.5 mg/hr  o Other Medications: Benadryl, Zofran  o Supportive Care: Docusate, Senna  Chronic Pain Medications:    none  Baseline Hemoglobin: 12 mg/dl  Hydroxyurea use: no  H/O blood transfusions: Yes (partial exchange since 2013, full transfusion protocol 2006-13, now fully chelated)    H/O Transfusion Reactions: no    Antibodies: none known  H/O Acute Chest Syndrome: none    Last episode: n/a    ICU/intubation: no  H/O Stroke: Yes (macular infarct ~2006, vision normalized)  H/O VTE: no  H/O Cholecystectomy or Splenectomy: Cholecystectomy (2011)  H/O Asthma, Pulm HTN, AVN, Leg Ulcers, Nephropathy, Retinopathy, etc: none    Review Of Systems:  General: Good energy and appetite. No fevers. No concerns for pain.   HEENT: Denies concerns with vision or hearing.  No yellowing of the whites of eyes.  Respiratory: No SOB or orthopnea. No cough. No wheezing.   Cardiovascular: No chest pain, dizziness  nor palpitations.   Endocrine: No hot/cold intolerance. No increase thirst or urination.   GI: No n/v/d/d nor abdominal pain. No splenic pain  : No difficulty with urination. No hematuria.   Skin: No current rashes, bruises, petechiae or other skin lesions noted.   Neuro: No weakness or numbness. No HA.  MSK: No change in ROM or function.     Past Medical History:   Past Medical History:   Diagnosis Date     Gallstones 2011    s/p cholecystectomy     Hb-SS  "disease without crisis (H)      History of CVA (cerebrovascular accident) 07/2006    macular infarct, but regained vision     Iron overload due to repeated red blood cell transfusions 2013    s/p chelation     Sleep apnea     Treated with CPAP.       Past Surgical History:  Past Surgical History:   Procedure Laterality Date     CHOLECYSTECTOMY N/A 2011     LIVER BIOPSY N/A 2007    iron overload monitoring     LIVER BIOPSY N/A 2008    monitoring for iron overload       Past Family History:   Family History   Problem Relation Age of Onset     Sickle Cell Trait Mother      Sickle Cell Trait Father      No Known Problems Sister      No Known Problems Sister      Sickle Cell Trait Brother      Breast Cancer Maternal Grandmother      Breast Cancer Maternal Great-Grandmother        Social History:   Social History     Socioeconomic History     Marital status: Single     Spouse name: Not on file     Number of children: Not on file     Years of education: Not on file     Highest education level: Not on file   Occupational History     Not on file   Tobacco Use     Smoking status: Never Smoker     Smokeless tobacco: Never Used   Substance and Sexual Activity     Alcohol use: Yes     Alcohol/week: 2.0 standard drinks     Types: 2 Standard drinks or equivalent per week     Comment: \"A few beers every other weekend.\"     Drug use: Never     Sexual activity: Not on file   Other Topics Concern     Not on file   Social History Narrative    Recently moved to [place]. 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     Social Determinants of Health     Financial Resource Strain: Not on file   Food Insecurity: Not on file   Transportation Needs: Not on file   Physical Activity: Not on file   Stress: Not on file   Social Connections: Not on file   Intimate Partner Violence: Not on file   Housing Stability: Not on file       Current Medications:    Current Outpatient Medications   Medication     " acetaminophen-codeine (TYLENOL W/CODEINE #3) 300-30 MG per tablet     HYDROcodone-acetaminophen (NORCO) 5-325 MG tablet     No current facility-administered medications for this visit.           Physical Exam:  /80   Pulse 77   Temp 98.5  F (36.9  C)   Resp 18   Wt 103.9 kg (229 lb)   SpO2 99%   BMI 31.36 kg/m     Gen: healthy gentleman, conversant, no acute distress  HEENT: normocephalic, atraumatic, no icterus  LYMPH: no cervical adenopathy  CV: RRR, no murmurs  RESP: CTA bilaterally  ABD: soft, nondistended  MSK: normal gait    Labs:   Latest Reference Range & Units 07/11/22 11:14   Sodium 133 - 144 mmol/L 139   Potassium 3.4 - 5.3 mmol/L 4.2   Chloride 94 - 109 mmol/L 106   Carbon Dioxide 20 - 32 mmol/L 26   Urea Nitrogen 7 - 30 mg/dL 8   Creatinine 0.66 - 1.25 mg/dL 0.62 (L)   GFR Estimate >60 mL/min/1.73m2 >90   Calcium 8.5 - 10.1 mg/dL 9.2   Anion Gap 3 - 14 mmol/L 7   Albumin 3.4 - 5.0 g/dL 4.0   Protein Total 6.8 - 8.8 g/dL 8.2   Alkaline Phosphatase 40 - 150 U/L 105   ALT 0 - 70 U/L 71 (H)   AST 0 - 45 U/L 55 (H)   Bilirubin Total 0.2 - 1.3 mg/dL 1.3   Glucose 70 - 99 mg/dL 94   WBC 4.0 - 11.0 10e3/uL 6.5   Hemoglobin 13.3 - 17.7 g/dL 10.3 (L)   Hematocrit 40.0 - 53.0 % 30.2 (L)   Platelet Count 150 - 450 10e3/uL 547 (H)   RBC Count 4.40 - 5.90 10e6/uL 3.64 (L)   MCV 78 - 100 fL 83   MCH 26.5 - 33.0 pg 28.3   MCHC 31.5 - 36.5 g/dL 34.1   RDW 10.0 - 15.0 % 17.9 (H)   % Neutrophils % 50   % Lymphocytes % 28   % Monocytes % 12   % Eosinophils % 8   % Basophils % 2   Absolute Basophils 0.0 - 0.2 10e3/uL 0.1   Absolute Eosinophils 0.0 - 0.7 10e3/uL 0.5   Absolute Immature Granulocytes <=0.4 10e3/uL 0.0   Absolute Lymphocytes 0.8 - 5.3 10e3/uL 1.8   Absolute Monocytes 0.0 - 1.3 10e3/uL 0.8   % Immature Granulocytes % 0   Absolute Neutrophils 1.6 - 8.3 10e3/uL 3.3   Absolute NRBCs 10e3/uL 0.1   NRBCs per 100 WBC <1 /100 1 (H)   % Retic 0.5 - 2.0 % 6.1 (H)   Absolute Retic 0.025 - 0.095 10e6/uL 0.221  (H)   (L): Data is abnormally low  (H): Data is abnormally high    Assessment:   Ricky Pak is a 26 year old male with HbSS Disease and a history of macular infarct who recently moved back to [place] for a third time for work. He has been here for a month and will be here through September. He is back on a transfusion regimen after being off while in [place] for three months. It does sound like he was mildly symptomatic during that time.      Plan:  1) Labs: CBC, BMP, retic  2) Referral: ophthalmology, recommended dental as well  3) Follow up: 2 months prior to moving away again    I spent a total of 43 minutes during the video visit and documentation along with lab review on the day of the appointment          Again, thank you for allowing me to participate in the care of your patient.      Sincerely,    Manny Denson MD

## 2022-07-11 NOTE — PROGRESS NOTES
Adult Sickle Cell Outpatient Clinic Note        Date of Visit: 07/11/2022    Ricky Pak is a 26 year old male who is being seen as a follow up for SCD care. He intermittently gets care in our system when his work schedules him in [place]. He started back here in June 2022 for a 3 month work stint.     Interval History  Ricky was last here from Dec 2021 through March 2022. After that, he went to [place] but he was not able to get in for regular exchange transfusions so he did not get any for three months until getting back here 3 weeks ago. He did get into his exchange regimen back here without any issues. 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 He has had a good summer so far. A few more aches and pains since he was off exchanges but nothing that has warranted any emergency visits. His mom surprised him and came up to visit right after the 4th of July which was exciting for him. No other major health issues but he does want to get looped into a dentist and an opththalmologist (last seen in [place] in 2019).  He has not had any recent illnesses. No new eye pain. No other pain crises. He did remark on how cold it is up in [place], even compared to the [facility] closer to where he was last time. Thus, he was very happy did not have a cold-induced crisis so far.    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 He was on monthly transfusions from 1707-0253 with chelation and in 2013 he was switched to peripheral RBC exchange which has kept him in great health since then. He no longer needs chelation and is able to keep his HbS ~30% or below with regularity. He grew up in [place] and has spent most of his life in the [facility]. 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 He just got sent to [facility] and moved here one week ago. He denies regular pain and has not had a pain crisis since he was a teenager. He has kept up with his exchange transfusions and feels quite healthy overall. He regained the vision after having a macular infarct at age 10 and has no further neurologic symptoms since. He does not take any regular medications and has not had any opioids in many years either. He has no complaints today. He met with the apheresis team before this visit and is squared away to be maintained on his exchange schedule here.    ---------------------------------------  Ricky Pak's Goals (discussed 07/11/2022 )    1-3 month goal:      6 month goal:  Dental, eye exam    12 month goal:      Disease-specific goal(s):  Maintain good exchange regimens despite moving about the country  ---------------------------------------      Sickle Cell Disease Comprehensive Checklist    Bone Health/Avascular Necrosis Screening/Symptoms (each visit): 11/6/2020, none    Leg Ulcer evaluation (every visit): 11/6/2020, none    Hypertension (every visit): 7/11/22    Last ophthalmologic exam: Due (last seen in 2019)    Last urinalysis for microalbuminuria/CKD (annually): Needs at next visit    Last pulmonary evaluation (asthma, WILFREDO, pulm HTN): unknown    Stroke/silent cerebral infarct Hx (Y/N): macular infarct ~2006, vision now normalized    Last PCP Visit: just moved to [place]    Vaccines:   Reported up to date. Flu shot (2021-22) from work    Plan last reviewed with patient: 12/20/21    Patient background: 27 yo male who is from [place], who initially came to [place] in late 2020. 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 No children or significant others    Sickle Cell Disease History  Primary Hematologist: Janiya  Genotype: SS  Acute Pain Crisis  Treatment: (Opioid-naive)    ER/Acute Care/Infusion Clinic:   o Morphine 1 mg IVP/SC Q1H X 3 doses  o Other: Benadryl PO and Zofran 8 mg IV    Inpatient:  o Opioid: Morphine 1 mg IV Q1H PRN until PCA starts  o PCA plan:  - PCA button dose: Morphine 1 mg  - Lockout: 20 minutes  - Continuous Infusion: consider 0.5 mg/hr  o Other Medications: Benadryl, Zofran  o Supportive Care: Docusate, Senna  Chronic Pain Medications:    none  Baseline Hemoglobin: 12 mg/dl  Hydroxyurea use: no  H/O blood transfusions: Yes (partial exchange since 2013, full transfusion protocol 2006-13, now fully chelated)    H/O Transfusion Reactions: no    Antibodies: none known  H/O Acute Chest Syndrome: none    Last episode: n/a    ICU/intubation: no  H/O Stroke: Yes (macular infarct ~2006, vision normalized)  H/O VTE: no  H/O Cholecystectomy or Splenectomy: Cholecystectomy (2011)  H/O Asthma, Pulm HTN, AVN, Leg Ulcers, Nephropathy, Retinopathy, etc: none    Review Of Systems:  General: Good energy and appetite. No fevers. No concerns for pain.   HEENT: Denies concerns with vision or hearing.  No yellowing of the whites of eyes.  Respiratory: No SOB or orthopnea. No cough. No wheezing.   Cardiovascular: No chest pain, dizziness  nor palpitations.   Endocrine: No hot/cold intolerance. No increase thirst or urination.   GI: No n/v/d/d nor abdominal pain. No splenic pain  : No difficulty with urination. No hematuria.   Skin: No current rashes, bruises, petechiae or other skin lesions noted.   Neuro: No weakness or numbness. No HA.  MSK: No change in ROM or function.     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       Past Surgical  "History:  Past Surgical History:   Procedure Laterality Date     CHOLECYSTECTOMY N/A 2011     LIVER BIOPSY N/A 2007    iron overload monitoring     LIVER BIOPSY N/A 2008    monitoring for iron overload       Past Family History:   Family History   Problem Relation Age of Onset     Sickle Cell Trait Mother      Sickle Cell Trait Father      No Known Problems Sister      No Known Problems Sister      Sickle Cell Trait Brother      Breast Cancer Maternal Grandmother      Breast Cancer Maternal Great-Grandmother        Social History:   Social History     Socioeconomic History     Marital status: Single     Spouse name: Not on file     Number of children: Not on file     Years of education: Not on file     Highest education level: Not on file   Occupational History     Not on file   Tobacco Use     Smoking status: Never Smoker     Smokeless tobacco: Never Used   Substance and Sexual Activity     Alcohol use: Yes     Alcohol/week: 2.0 standard drinks     Types: 2 Standard drinks or equivalent per week     Comment: \"A few beers every other weekend.\"     Drug use: Never     Sexual activity: Not on file   Other Topics Concern     Not on file   Social History Narrative    Recently moved to [place]. 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     Social Determinants of Health     Financial Resource Strain: Not on file   Food Insecurity: Not on file   Transportation Needs: Not on file   Physical Activity: Not on file   Stress: Not on file   Social Connections: Not on file   Intimate Partner Violence: Not on file   Housing Stability: Not on file       Current Medications:    Current Outpatient Medications   Medication     acetaminophen-codeine (TYLENOL W/CODEINE #3) 300-30 MG per tablet     HYDROcodone-acetaminophen (NORCO) 5-325 MG tablet     No current facility-administered medications for this visit.           Physical Exam:  /80   Pulse 77   Temp 98.5  F (36.9  C)   Resp 18   Wt 103.9 kg " (229 lb)   SpO2 99%   BMI 31.36 kg/m     Gen: healthy gentleman, conversant, no acute distress  HEENT: normocephalic, atraumatic, no icterus  LYMPH: no cervical adenopathy  CV: RRR, no murmurs  RESP: CTA bilaterally  ABD: soft, nondistended  MSK: normal gait    Labs:   Latest Reference Range & Units 07/11/22 11:14   Sodium 133 - 144 mmol/L 139   Potassium 3.4 - 5.3 mmol/L 4.2   Chloride 94 - 109 mmol/L 106   Carbon Dioxide 20 - 32 mmol/L 26   Urea Nitrogen 7 - 30 mg/dL 8   Creatinine 0.66 - 1.25 mg/dL 0.62 (L)   GFR Estimate >60 mL/min/1.73m2 >90   Calcium 8.5 - 10.1 mg/dL 9.2   Anion Gap 3 - 14 mmol/L 7   Albumin 3.4 - 5.0 g/dL 4.0   Protein Total 6.8 - 8.8 g/dL 8.2   Alkaline Phosphatase 40 - 150 U/L 105   ALT 0 - 70 U/L 71 (H)   AST 0 - 45 U/L 55 (H)   Bilirubin Total 0.2 - 1.3 mg/dL 1.3   Glucose 70 - 99 mg/dL 94   WBC 4.0 - 11.0 10e3/uL 6.5   Hemoglobin 13.3 - 17.7 g/dL 10.3 (L)   Hematocrit 40.0 - 53.0 % 30.2 (L)   Platelet Count 150 - 450 10e3/uL 547 (H)   RBC Count 4.40 - 5.90 10e6/uL 3.64 (L)   MCV 78 - 100 fL 83   MCH 26.5 - 33.0 pg 28.3   MCHC 31.5 - 36.5 g/dL 34.1   RDW 10.0 - 15.0 % 17.9 (H)   % Neutrophils % 50   % Lymphocytes % 28   % Monocytes % 12   % Eosinophils % 8   % Basophils % 2   Absolute Basophils 0.0 - 0.2 10e3/uL 0.1   Absolute Eosinophils 0.0 - 0.7 10e3/uL 0.5   Absolute Immature Granulocytes <=0.4 10e3/uL 0.0   Absolute Lymphocytes 0.8 - 5.3 10e3/uL 1.8   Absolute Monocytes 0.0 - 1.3 10e3/uL 0.8   % Immature Granulocytes % 0   Absolute Neutrophils 1.6 - 8.3 10e3/uL 3.3   Absolute NRBCs 10e3/uL 0.1   NRBCs per 100 WBC <1 /100 1 (H)   % Retic 0.5 - 2.0 % 6.1 (H)   Absolute Retic 0.025 - 0.095 10e6/uL 0.221 (H)   (L): Data is abnormally low  (H): Data is abnormally high    Assessment:   Ricky Pak is a 26 year old male with HbSS Disease and a history of macular infarct who recently moved back to [place] for a third time for work. He has been here for a month and will be here through  September. He is back on a transfusion regimen after being off while in [place] for three months. It does sound like he was mildly symptomatic during that time.      Plan:  1) Labs: CBC, BMP, retic  2) Referral: ophthalmology, recommended dental as well  3) Follow up: 2 months prior to moving away again    I spent a total of 43 minutes during the video visit and documentation along with lab review on the day of the appointment      Manny Denson MD  Hematologist  Division of Hematology, Oncology, and Transplantation  [facility]  [facility] [place]  Pager: [phone]

## 2022-07-11 NOTE — NURSING NOTE
Chief Complaint   Patient presents with     Labs Only     Venipuncture, vitals checked     Danielle Garsia RN on 7/11/2022 at 11:16 AM

## 2022-07-11 NOTE — NURSING NOTE
"Oncology Rooming Note    July 11, 2022 11:52 AM   Ricky Pak is a 26 year old male who presents for:    Chief Complaint   Patient presents with     Labs Only     Venipuncture, vitals checked     Oncology Clinic Visit     Sickle cell disease without crisis (H); Hb-SS disease without crisis (H)      Initial Vitals: /80   Pulse 77   Temp 98.5  F (36.9  C)   Resp 18   Wt 103.9 kg (229 lb)   SpO2 99%   BMI 31.36 kg/m   Estimated body mass index is 31.36 kg/m  as calculated from the following:    Height as of 6/22/22: 1.82 m (5' 11.65\").    Weight as of this encounter: 103.9 kg (229 lb). Body surface area is 2.29 meters squared.  No Pain (0) Comment: Data Unavailable   No LMP for male patient.  Allergies reviewed: Yes  Medications reviewed: Yes    Medications: Medication refills not needed today.  Pharmacy name entered into FileThis: [facility] [place], [place] - [address] [place] [address] [id number]    Clinical concerns: None       Lawrence Ohara            "

## 2022-07-12 LAB — DEPRECATED CALCIDIOL+CALCIFEROL SERPL-MC: 26 UG/L (ref 20–75)

## 2022-07-13 LAB — HGB S BLD QL: NORMAL

## 2022-07-20 ENCOUNTER — LAB (OUTPATIENT)
Dept: LAB | Facility: CLINIC | Age: 27
End: 2022-07-20
Attending: PEDIATRICS
Payer: COMMERCIAL

## 2022-07-20 DIAGNOSIS — D57.1 HB-SS DISEASE WITHOUT CRISIS (H): Primary | ICD-10-CM

## 2022-07-20 DIAGNOSIS — D57.1 SICKLE CELL DISEASE WITHOUT CRISIS (H): ICD-10-CM

## 2022-07-20 DIAGNOSIS — D57.1 HB-SS DISEASE WITHOUT CRISIS (H): ICD-10-CM

## 2022-07-20 LAB
ABO/RH(D): NORMAL
ANTIBODY SCREEN: NEGATIVE
BASOPHILS # BLD AUTO: 0.2 10E3/UL (ref 0–0.2)
BASOPHILS NFR BLD AUTO: 2 %
EOSINOPHIL # BLD AUTO: 0.7 10E3/UL (ref 0–0.7)
EOSINOPHIL NFR BLD AUTO: 8 %
ERYTHROCYTE [DISTWIDTH] IN BLOOD BY AUTOMATED COUNT: 18.6 % (ref 10–15)
HCT VFR BLD AUTO: 30.6 % (ref 40–53)
HGB BLD-MCNC: 10.3 G/DL (ref 13.3–17.7)
IMM GRANULOCYTES # BLD: 0 10E3/UL
IMM GRANULOCYTES NFR BLD: 0 %
LYMPHOCYTES # BLD AUTO: 1.8 10E3/UL (ref 0.8–5.3)
LYMPHOCYTES NFR BLD AUTO: 22 %
MCH RBC QN AUTO: 28.2 PG (ref 26.5–33)
MCHC RBC AUTO-ENTMCNC: 33.7 G/DL (ref 31.5–36.5)
MCV RBC AUTO: 84 FL (ref 78–100)
MONOCYTES # BLD AUTO: 1.1 10E3/UL (ref 0–1.3)
MONOCYTES NFR BLD AUTO: 13 %
NEUTROPHILS # BLD AUTO: 4.7 10E3/UL (ref 1.6–8.3)
NEUTROPHILS NFR BLD AUTO: 55 %
NRBC # BLD AUTO: 0.1 10E3/UL
NRBC BLD AUTO-RTO: 1 /100
PLATELET # BLD AUTO: 500 10E3/UL (ref 150–450)
RBC # BLD AUTO: 3.65 10E6/UL (ref 4.4–5.9)
RETICS # AUTO: 0.23 10E6/UL (ref 0.03–0.1)
RETICS/RBC NFR AUTO: 6.3 % (ref 0.5–2)
SPECIMEN EXPIRATION DATE: NORMAL
WBC # BLD AUTO: 8.4 10E3/UL (ref 4–11)

## 2022-07-20 PROCEDURE — 85045 AUTOMATED RETICULOCYTE COUNT: CPT

## 2022-07-20 PROCEDURE — 83021 HEMOGLOBIN CHROMOTOGRAPHY: CPT

## 2022-07-20 PROCEDURE — 36415 COLL VENOUS BLD VENIPUNCTURE: CPT

## 2022-07-20 PROCEDURE — 86901 BLOOD TYPING SEROLOGIC RH(D): CPT

## 2022-07-20 PROCEDURE — 85004 AUTOMATED DIFF WBC COUNT: CPT

## 2022-07-20 PROCEDURE — 86923 COMPATIBILITY TEST ELECTRIC: CPT

## 2022-07-20 NOTE — NURSING NOTE
Chief Complaint   Patient presents with     Blood Draw     Labs collected from venipuncture by RN.      Labs collected from venipuncture by RN.    Gladys DE LA ROSA RN PHN BSN  BMT/Oncology Lab    
Alert and oriented to person, place, time/situation. normal mood and affect. no apparent risk to self or others.

## 2022-07-21 RX ORDER — HYDROXYZINE HYDROCHLORIDE 25 MG/1
25 TABLET, FILM COATED ORAL ONCE
Status: CANCELLED | OUTPATIENT
Start: 2022-07-21

## 2022-07-22 ENCOUNTER — HOSPITAL ENCOUNTER (OUTPATIENT)
Dept: LAB | Facility: CLINIC | Age: 27
Discharge: HOME | End: 2022-07-22
Attending: PEDIATRICS | Admitting: PEDIATRICS
Payer: COMMERCIAL

## 2022-07-22 VITALS
TEMPERATURE: 98.3 F | DIASTOLIC BLOOD PRESSURE: 75 MMHG | WEIGHT: 229.06 LBS | BODY MASS INDEX: 31.37 KG/M2 | SYSTOLIC BLOOD PRESSURE: 116 MMHG | RESPIRATION RATE: 20 BRPM | HEART RATE: 85 BPM

## 2022-07-22 LAB
BLD PROD TYP BPU: NORMAL
BLOOD COMPONENT TYPE: NORMAL
CODING SYSTEM: NORMAL
CROSSMATCH: NORMAL
HCT VFR BLD AUTO: 29.2 % (ref 40–53)
HGB BLD-MCNC: 9.8 G/DL (ref 13.3–17.7)
ISSUE DATE AND TIME: NORMAL
UNIT ABO/RH: NORMAL
UNIT NUMBER: NORMAL
UNIT STATUS: NORMAL
UNIT TYPE ISBT: 9500

## 2022-07-22 PROCEDURE — 250N000013 HC RX MED GY IP 250 OP 250 PS 637

## 2022-07-22 PROCEDURE — 999N000248 HC STATISTIC IV INSERT WITH US BY RN

## 2022-07-22 PROCEDURE — 85014 HEMATOCRIT: CPT

## 2022-07-22 PROCEDURE — P9016 RBC LEUKOCYTES REDUCED: HCPCS

## 2022-07-22 PROCEDURE — 36415 COLL VENOUS BLD VENIPUNCTURE: CPT

## 2022-07-22 PROCEDURE — 83021 HEMOGLOBIN CHROMOTOGRAPHY: CPT

## 2022-07-22 PROCEDURE — 250N000009 HC RX 250

## 2022-07-22 PROCEDURE — 36512 APHERESIS RBC: CPT

## 2022-07-22 RX ORDER — HYDROXYZINE HYDROCHLORIDE 25 MG/1
25 TABLET, FILM COATED ORAL ONCE
Status: COMPLETED | OUTPATIENT
Start: 2022-07-22 | End: 2022-07-22

## 2022-07-22 RX ADMIN — HYDROXYZINE HYDROCHLORIDE 25 MG: 25 TABLET, FILM COATED ORAL at 08:18

## 2022-07-22 RX ADMIN — ANTICOAGULANT CITRATE DEXTROSE SOLUTION FORMULA A 489 ML: 12.25; 11; 3.65 SOLUTION INTRAVENOUS at 11:44

## 2022-07-22 NOTE — DISCHARGE INSTRUCTIONS
Ap[facility]is [facility] Post Instructions  You may feel tired after your procedure today.   Please call your doctor if you have:  bleeding that doesn t stop, fever, pain where a needle or tube (catheter) was placed, seizures, trouble breathing, red urine, nausea or vomiting, other health concerns.     If your symptoms are severe, call [phone].  Your veins were used, keep the bandages on for 2-4 hours.  Avoid heavy lifting with your arms.  If bleeding occurs from these sites, apply firm pressure for 5-10 minutes.  Call your physician if bleeding continues.    The Apheresis/Blood Donor Center is open Monday-Friday 7:30 a.m. to 5 p.m.  The phone number is [phone].  A Transfusion Medicine physician can be reached after 5:00 p.m. weekdays and on weekends /Holidays by calling [phone], and asking for the physician on call.      Red blood cell exchange:   Friday, 7/22/2022, you received blood products (red blood cells) as part of your treatment, you need to be aware that transfusion reactions can occur up to several hours after they have been given to you.  Call your physician if you experience any symptoms in the next 48 hours, including breathing problems, rash, itching, hives, nausea or vomiting, fever or chills, blood in your urine or stools, or joint pain.  Please inform the Transfusion Medicine Physician by calling [phone] and asking for the physician on call.

## 2022-07-22 NOTE — PROCEDURES
Laboratory Medicine and Pathology  Transfusion Medicine - Apheresis Procedure Note    Ricky Pak MRN# [id number]   YOB: 1995 Age: 26 year old   Date of Procedure: 6/22/2022    Procedure:  RBCx   Reason for Procedure: Sickle Cell Disease         Assessment and Plan:   Ricky Pak is a 26 year old male  with sickle cell anemia who underwent maintenance red blood cell exchange. Peripheral veins were used for access.  He tolerated the procedure well.  His starting hematocrit was 30.6 (from 7/20) and his immediate pre-procedural hematocrit was 29.2%.  We targeted 30% for the end of the procedure, however we had a problem obtaining the post-procedural labs.       History of Present Illness   Ricky Pak is a 26 year old male  who presents for red blood cell exchange. His past medical history includes gallstones, status post cholecystectomy, and sickle cell anemia complicated by a CVA with macular infarct and iron overload secondary to repeated blood transfusions.     He began apheresis exchanges around 2013 following his issues with iron overload.  Per previous documentation, these exchanges have done an excellent job preventing sickle cell crises. He had a hypotensive episode following a recent  apheresis procedure here which was a depletion followed by an exchange. Therefore, we plan to proceed with an exchange alone.        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             Past Surgical History:     Past Surgical History:   Procedure Laterality Date     CHOLECYSTECTOMY N/A 2011     LIVER BIOPSY N/A 2007    iron overload monitoring     LIVER BIOPSY N/A 2008    monitoring  "for iron overload              Social History:     Social History     Tobacco Use     Smoking status: Never Smoker     Smokeless tobacco: Never Used   Substance Use Topics     Alcohol use: Yes     Alcohol/week: 2.0 standard drinks     Types: 2 Standard drinks or equivalent per week     Comment: \"A few beers every other weekend.\"            Allergies:     Allergies   Allergen Reactions     Diphenhydramine Hives             Medications:     Current Outpatient Medications   Medication Sig     acetaminophen-codeine (TYLENOL W/CODEINE #3) 300-30 MG per tablet Take 1-2 tablets by mouth as needed     HYDROcodone-acetaminophen (NORCO) 5-325 MG tablet Take 1-2 tablets by mouth as needed     No current facility-administered medications for this encounter.             Abbreviated Physical Exam:   /75   Pulse 85   Temp 98.3  F (36.8  C) (Oral)   Resp 20   Wt 103.9 kg (229 lb 0.9 oz)   BMI 31.37 kg/m    Patient Alert & Oriented and in No Acute Distress          Laboratory Data:   BMPNo lab results found in last 7 days.  CBC  Recent Labs   Lab 07/22/22  0858 07/20/22  0838   WBC  --  8.4   RBC  --  3.65*   HGB 9.8* 10.3*   HCT 29.2* 30.6*   MCV  --  84   MCH  --  28.2   MCHC  --  33.7   RDW  --  18.6*   PLT  --  500*            Procedure Summary:   A red blood cell exchange was performed with a Spectra Optia cell separator.  The vascular access was peripheral IV.  ACD-A was used for anticoagulation. The replacement fluid was 10 red blood cell units.  The patient's vital signs were stable throughout.  The patient tolerated the procedure well  ATTESTATION STATEMENT:   During the procedure this patient was directly seen and evaluated by me , Sofi Ochoa MD, PhD.      Sofi Ochoa MD, PhD  Transfusion Medicine Attending  Medical Director, Blood Bank Laboratory  Pager [phone]             "

## 2022-07-27 LAB
HGB S BLD QL: NORMAL
HGB S BLD QL: NORMAL

## 2022-08-02 ENCOUNTER — OFFICE VISIT (OUTPATIENT)
Dept: OPHTHALMOLOGY | Facility: CLINIC | Age: 27
End: 2022-08-02
Attending: PEDIATRICS
Payer: COMMERCIAL

## 2022-08-02 DIAGNOSIS — D57.1 HB-SS DISEASE WITHOUT CRISIS (H): Primary | ICD-10-CM

## 2022-08-02 DIAGNOSIS — H52.13 MYOPIA OF BOTH EYES: ICD-10-CM

## 2022-08-02 DIAGNOSIS — Z86.73 HISTORY OF CVA (CEREBROVASCULAR ACCIDENT): ICD-10-CM

## 2022-08-02 PROCEDURE — 92015 DETERMINE REFRACTIVE STATE: CPT

## 2022-08-02 PROCEDURE — 92134 CPTRZ OPH DX IMG PST SGM RTA: CPT | Performed by: OPHTHALMOLOGY

## 2022-08-02 PROCEDURE — 92083 EXTENDED VISUAL FIELD XM: CPT | Performed by: OPHTHALMOLOGY

## 2022-08-02 PROCEDURE — G0463 HOSPITAL OUTPT CLINIC VISIT: HCPCS

## 2022-08-02 PROCEDURE — 92235 FLUORESCEIN ANGRPH MLTIFRAME: CPT | Performed by: OPHTHALMOLOGY

## 2022-08-02 PROCEDURE — 92004 COMPRE OPH EXAM NEW PT 1/>: CPT | Performed by: OPHTHALMOLOGY

## 2022-08-02 ASSESSMENT — VISUAL ACUITY
OD_PH_SC: 20/20
OD_SC: 20/50
OS_PH_SC: 20/25
OD_SC+: -2
OS_SC: 20/40
METHOD: SNELLEN - LINEAR
OS_SC+: -1

## 2022-08-02 ASSESSMENT — TONOMETRY
OS_IOP_MMHG: 15
IOP_METHOD: TONOPEN
OD_IOP_MMHG: 16

## 2022-08-02 ASSESSMENT — SLIT LAMP EXAM - LIDS
COMMENTS: NORMAL
COMMENTS: NORMAL

## 2022-08-02 ASSESSMENT — REFRACTION_MANIFEST
OD_AXIS: 060
OS_SPHERE: -0.25
OD_SPHERE: -1.50
OD_CYLINDER: +0.25

## 2022-08-02 ASSESSMENT — EXTERNAL EXAM - LEFT EYE: OS_EXAM: NORMAL

## 2022-08-02 ASSESSMENT — CONF VISUAL FIELD
OD_NORMAL: 1
METHOD: COUNTING FINGERS
OS_NORMAL: 1

## 2022-08-02 ASSESSMENT — CUP TO DISC RATIO
OS_RATIO: 0.3
OD_RATIO: 0.25

## 2022-08-02 ASSESSMENT — EXTERNAL EXAM - RIGHT EYE: OD_EXAM: NORMAL

## 2022-08-02 NOTE — NURSING NOTE
Chief Complaints and History of Present Illnesses   Patient presents with     New Patient     Chief Complaint(s) and History of Present Illness(es)     New Patient     Laterality: both eyes    Quality: blurred vision    Context: driving    Associated symptoms: Negative for photophobia, flashes and floaters    Treatments tried: no treatments    Pain scale: 0/10              Comments     New patient .  The patient states his night vision is blurry.  Jacinta Combs, COA, COA 8:21 AM 08/02/2022

## 2022-08-17 ENCOUNTER — LAB (OUTPATIENT)
Dept: LAB | Facility: CLINIC | Age: 27
End: 2022-08-17
Attending: PEDIATRICS
Payer: COMMERCIAL

## 2022-08-17 DIAGNOSIS — D57.1 HB-SS DISEASE WITHOUT CRISIS (H): ICD-10-CM

## 2022-08-17 LAB
ABO/RH(D): NORMAL
ANTIBODY SCREEN: NEGATIVE
BASOPHILS # BLD AUTO: 0.2 10E3/UL (ref 0–0.2)
BASOPHILS NFR BLD AUTO: 2 %
EOSINOPHIL # BLD AUTO: 0.7 10E3/UL (ref 0–0.7)
EOSINOPHIL NFR BLD AUTO: 7 %
ERYTHROCYTE [DISTWIDTH] IN BLOOD BY AUTOMATED COUNT: 18.8 % (ref 10–15)
HCT VFR BLD AUTO: 31.9 % (ref 40–53)
HGB BLD-MCNC: 10.7 G/DL (ref 13.3–17.7)
IMM GRANULOCYTES # BLD: 0 10E3/UL
IMM GRANULOCYTES NFR BLD: 0 %
LYMPHOCYTES # BLD AUTO: 2.3 10E3/UL (ref 0.8–5.3)
LYMPHOCYTES NFR BLD AUTO: 22 %
MCH RBC QN AUTO: 26.8 PG (ref 26.5–33)
MCHC RBC AUTO-ENTMCNC: 33.5 G/DL (ref 31.5–36.5)
MCV RBC AUTO: 80 FL (ref 78–100)
MONOCYTES # BLD AUTO: 1.2 10E3/UL (ref 0–1.3)
MONOCYTES NFR BLD AUTO: 12 %
NEUTROPHILS # BLD AUTO: 5.9 10E3/UL (ref 1.6–8.3)
NEUTROPHILS NFR BLD AUTO: 57 %
NRBC # BLD AUTO: 0 10E3/UL
NRBC BLD AUTO-RTO: 0 /100
PLATELET # BLD AUTO: 491 10E3/UL (ref 150–450)
RBC # BLD AUTO: 3.99 10E6/UL (ref 4.4–5.9)
RETICS # AUTO: 0.17 10E6/UL (ref 0.03–0.1)
RETICS/RBC NFR AUTO: 4.2 % (ref 0.5–2)
SPECIMEN EXPIRATION DATE: NORMAL
WBC # BLD AUTO: 10.2 10E3/UL (ref 4–11)

## 2022-08-17 PROCEDURE — 86901 BLOOD TYPING SEROLOGIC RH(D): CPT

## 2022-08-17 PROCEDURE — 85025 COMPLETE CBC W/AUTO DIFF WBC: CPT

## 2022-08-17 PROCEDURE — 36415 COLL VENOUS BLD VENIPUNCTURE: CPT

## 2022-08-17 PROCEDURE — 83021 HEMOGLOBIN CHROMOTOGRAPHY: CPT

## 2022-08-17 PROCEDURE — 86923 COMPATIBILITY TEST ELECTRIC: CPT | Performed by: PATHOLOGY

## 2022-08-17 PROCEDURE — 85045 AUTOMATED RETICULOCYTE COUNT: CPT

## 2022-08-17 NOTE — NURSING NOTE
Chief Complaint   Patient presents with     Labs Only     Labs drawn from  by RN in lab.     Eliseo Rosario RN

## 2022-08-19 ENCOUNTER — HOSPITAL ENCOUNTER (OUTPATIENT)
Dept: LAB | Facility: CLINIC | Age: 27
Discharge: HOME | End: 2022-08-19
Attending: PEDIATRICS | Admitting: PEDIATRICS
Payer: COMMERCIAL

## 2022-08-19 VITALS
DIASTOLIC BLOOD PRESSURE: 62 MMHG | SYSTOLIC BLOOD PRESSURE: 125 MMHG | BODY MASS INDEX: 31.67 KG/M2 | WEIGHT: 231.26 LBS | HEART RATE: 82 BPM | TEMPERATURE: 98.7 F | RESPIRATION RATE: 20 BRPM

## 2022-08-19 LAB
HCT VFR BLD AUTO: 31.8 % (ref 40–53)
HCT VFR BLD AUTO: 32.2 % (ref 40–53)
HGB BLD-MCNC: 10.5 G/DL (ref 13.3–17.7)
HGB BLD-MCNC: 10.9 G/DL (ref 13.3–17.7)
HGB S BLD QL: NORMAL

## 2022-08-19 PROCEDURE — 250N000009 HC RX 250: Performed by: PATHOLOGY

## 2022-08-19 PROCEDURE — P9016 RBC LEUKOCYTES REDUCED: HCPCS

## 2022-08-19 PROCEDURE — 999N000248 HC STATISTIC IV INSERT WITH US BY RN

## 2022-08-19 PROCEDURE — 36415 COLL VENOUS BLD VENIPUNCTURE: CPT | Performed by: PATHOLOGY

## 2022-08-19 PROCEDURE — 36512 APHERESIS RBC: CPT

## 2022-08-19 PROCEDURE — 83021 HEMOGLOBIN CHROMOTOGRAPHY: CPT | Performed by: PATHOLOGY

## 2022-08-19 PROCEDURE — 85014 HEMATOCRIT: CPT | Performed by: PATHOLOGY

## 2022-08-19 PROCEDURE — 250N000013 HC RX MED GY IP 250 OP 250 PS 637: Performed by: PATHOLOGY

## 2022-08-19 RX ORDER — ACETAMINOPHEN 325 MG/1
650 TABLET ORAL EVERY 4 HOURS PRN
Status: DISCONTINUED | OUTPATIENT
Start: 2022-08-19 | End: 2022-08-20 | Stop reason: HOSPADM

## 2022-08-19 RX ORDER — ACETAMINOPHEN 325 MG/1
325 TABLET ORAL EVERY 4 HOURS PRN
Status: DISCONTINUED | OUTPATIENT
Start: 2022-08-19 | End: 2022-08-19

## 2022-08-19 RX ORDER — HYDROXYZINE HYDROCHLORIDE 25 MG/1
25 TABLET, FILM COATED ORAL ONCE
Status: COMPLETED | OUTPATIENT
Start: 2022-08-19 | End: 2022-08-19

## 2022-08-19 RX ORDER — HYDROXYZINE HYDROCHLORIDE 50 MG/1
50 TABLET, FILM COATED ORAL ONCE
Status: COMPLETED | OUTPATIENT
Start: 2022-08-19 | End: 2022-08-19

## 2022-08-19 RX ADMIN — ACETAMINOPHEN 650 MG: 325 TABLET ORAL at 08:50

## 2022-08-19 RX ADMIN — HYDROXYZINE HYDROCHLORIDE 25 MG: 50 TABLET, FILM COATED ORAL at 08:52

## 2022-08-19 RX ADMIN — ANTICOAGULANT CITRATE DEXTROSE SOLUTION FORMULA A 498 ML: 12.25; 11; 3.65 SOLUTION INTRAVENOUS at 09:38

## 2022-08-19 NOTE — DISCHARGE INSTRUCTIONS
Apheresis Blood Donor CeRed blood cell exchange:   You received blood products (red blood cells) as part of your treatment, you need to be aware that transfusion reactions can occur up to several hours after they have been given to you.  Call your physician if you experience any symptoms in the next 48 hours, including breathing problems, rash, itching, hives, nausea or vomiting, fever or chills, blood in your urine or stools, or joint pain.  Please inform the Transfusion Medicine Physician by calling [phone] and asking for the physician on call.nter Post Instructions  You may feel tired after your procedure today.   Please call your doctor if you have:  bleeding that doesn t stop, fever, pain where a needle or tube (catheter) was placed, seizures, trouble breathing, red urine, nausea or vomiting, other health concerns.     If your symptoms are severe, call [phone].  Your veins were used, keep the bandages on for 2-4 hours.  Avoid heavy lifting with your arms.  If bleeding occurs from these sites, apply firm pressure for 5-10 minutes.  Call your physician if bleeding continues.    The Apheresis/Blood Donor Center is open Monday-Friday 7:30 a.m. to 5 p.m.  The phone number is [phone].  A Transfusion Medicine physician can be reached after 5:00 p.m. weekdays and on weekends /Holidays by calling [phone], and asking for the physician on call.

## 2022-08-19 NOTE — PROCEDURES
Laboratory Medicine and Pathology  Transfusion Medicine - Apheresis Procedure Note    Ricky Pak MRN# [id number]   YOB: 1995 Age: 26 year old   Date of Procedure: 8/19/2022  Procedure:  Red blood cell (RBC) exchange   Reason for Procedure: Sickle Cell Disease (SCD)         Assessment and Plan:   Ricky Pak is a 26 year old male with SCD who underwent maintenance RBC exchange. Peripheral veins were used for access.  He was pre-medicated with Atarax 25 mg po.  He tolerated the procedure well, resting through much of it.  His starting hematocrit was 32.2% (31.9% on 8/17/22).  We targeted a hematocrit of 32% for the end of the procedure.  Post-RBC exchange labs pending.         History of Present Illness   Ricky Pak is a 26 year old male  who presents for RBC exchange. 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    He began apheresis-based RBC exchanges around 2013 following his issues with iron overload.  Per previous documentation, these exchanges have done an excellent job preventing sickle cell crises. He had a hypotensive episode following a previous apheresis procedure here which was a depletion followed by an exchange. Therefore, we plan to proceed with the exchange alone approach for all RBC exchanges for him.          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          Past Surgical History:     Past Surgical History:   Procedure Laterality Date     CHOLECYSTECTOMY N/A 2011     LIVER BIOPSY N/A 2007    iron overload monitoring  "    LIVER BIOPSY N/A 2008    monitoring for iron overload          Social History:     Social History     Tobacco Use     Smoking status: Never Smoker     Smokeless tobacco: Never Used   Substance Use Topics     Alcohol use: Yes     Alcohol/week: 2.0 standard drinks     Types: 2 Standard drinks or equivalent per week     Comment: \"A few beers every other weekend.\"          Allergies:     Allergies   Allergen Reactions     Diphenhydramine Hives          Medications:     Current Outpatient Medications   Medication Sig     acetaminophen-codeine (TYLENOL W/CODEINE #3) 300-30 MG per tablet Take 1-2 tablets by mouth as needed     HYDROcodone-acetaminophen (NORCO) 5-325 MG tablet Take 1-2 tablets by mouth as needed     Current Facility-Administered Medications   Medication     acetaminophen (TYLENOL) tablet 650 mg             Abbreviated Physical Exam:     Vitals:    08/19/22 0840 08/19/22 0935 08/19/22 0948 08/19/22 1008   BP: 119/68 113/55 116/75 115/73   Pulse: 60 68 69 71   Resp: 20 20 20 20   Temp: 98.9  F (37.2  C) 99  F (37.2  C) 98.9  F (37.2  C) 98.6  F (37  C)   TempSrc: Oral Oral Oral Oral   Weight:         Alert, oriented, no distress.          Laboratory Data:   CBC  Recent Labs   Lab 08/19/22  0946 08/17/22  0840   WBC  --  10.2   RBC  --  3.99*   HGB 10.5* 10.7*   HCT 32.2* 31.9*   MCV  --  80   MCH  --  26.8   MCHC  --  33.5   RDW  --  18.8*   PLT  --  491*            Procedure Summary:   A RBC exchange was performed with a Spectra Optia cell separator.  The vascular access was peripheral IV.  ACD-A was used for anticoagulation. The replacement fluid was 10 RBC units.  The patient's vital signs were stable throughout the procedure.  The patient tolerated the procedure well.    ATTESTATION STATEMENT:   During the procedure this patient was directly seen and evaluated by me, Spencer Mejia M.D..    Spencer Mejia M.D.  Professor, Transfusion Medicine  Laboratory Medicine & Pathology  Pager: " [phone]

## 2022-08-22 LAB
HGB S BLD QL: NORMAL
HGB S BLD QL: NORMAL

## 2022-09-11 LAB
ABO/RH(D): NORMAL
ANTIBODY SCREEN: NEGATIVE
SPECIMEN EXPIRATION DATE: NORMAL

## 2022-09-12 ENCOUNTER — ONCOLOGY VISIT (OUTPATIENT)
Dept: ONCOLOGY | Facility: CLINIC | Age: 27
End: 2022-09-12
Attending: PEDIATRICS
Payer: COMMERCIAL

## 2022-09-12 ENCOUNTER — APPOINTMENT (OUTPATIENT)
Dept: LAB | Facility: CLINIC | Age: 27
End: 2022-09-12
Payer: COMMERCIAL

## 2022-09-12 VITALS
OXYGEN SATURATION: 100 % | RESPIRATION RATE: 16 BRPM | SYSTOLIC BLOOD PRESSURE: 126 MMHG | TEMPERATURE: 99.2 F | WEIGHT: 228.1 LBS | HEART RATE: 69 BPM | DIASTOLIC BLOOD PRESSURE: 80 MMHG | BODY MASS INDEX: 31.24 KG/M2

## 2022-09-12 DIAGNOSIS — D57.1 HB-SS DISEASE WITHOUT CRISIS (H): Primary | ICD-10-CM

## 2022-09-12 LAB
BASOPHILS # BLD AUTO: 0.1 10E3/UL (ref 0–0.2)
BASOPHILS NFR BLD AUTO: 2 %
EOSINOPHIL # BLD AUTO: 0.4 10E3/UL (ref 0–0.7)
EOSINOPHIL NFR BLD AUTO: 4 %
ERYTHROCYTE [DISTWIDTH] IN BLOOD BY AUTOMATED COUNT: 17.9 % (ref 10–15)
HCT VFR BLD AUTO: 33.7 % (ref 40–53)
HGB BLD-MCNC: 11.2 G/DL (ref 13.3–17.7)
IMM GRANULOCYTES # BLD: 0 10E3/UL
IMM GRANULOCYTES NFR BLD: 0 %
LYMPHOCYTES # BLD AUTO: 2.2 10E3/UL (ref 0.8–5.3)
LYMPHOCYTES NFR BLD AUTO: 27 %
MCH RBC QN AUTO: 27.2 PG (ref 26.5–33)
MCHC RBC AUTO-ENTMCNC: 33.2 G/DL (ref 31.5–36.5)
MCV RBC AUTO: 82 FL (ref 78–100)
MONOCYTES # BLD AUTO: 0.7 10E3/UL (ref 0–1.3)
MONOCYTES NFR BLD AUTO: 9 %
NEUTROPHILS # BLD AUTO: 4.7 10E3/UL (ref 1.6–8.3)
NEUTROPHILS NFR BLD AUTO: 58 %
NRBC # BLD AUTO: 0.1 10E3/UL
NRBC BLD AUTO-RTO: 1 /100
PLATELET # BLD AUTO: 559 10E3/UL (ref 150–450)
RBC # BLD AUTO: 4.12 10E6/UL (ref 4.4–5.9)
RETICS # AUTO: 0.16 10E6/UL (ref 0.03–0.1)
RETICS/RBC NFR AUTO: 3.8 % (ref 0.5–2)
WBC # BLD AUTO: 8.1 10E3/UL (ref 4–11)

## 2022-09-12 PROCEDURE — 83021 HEMOGLOBIN CHROMOTOGRAPHY: CPT | Performed by: PEDIATRICS

## 2022-09-12 PROCEDURE — G0463 HOSPITAL OUTPT CLINIC VISIT: HCPCS

## 2022-09-12 PROCEDURE — 85025 COMPLETE CBC W/AUTO DIFF WBC: CPT | Performed by: PEDIATRICS

## 2022-09-12 PROCEDURE — 85045 AUTOMATED RETICULOCYTE COUNT: CPT | Performed by: PEDIATRICS

## 2022-09-12 PROCEDURE — 36415 COLL VENOUS BLD VENIPUNCTURE: CPT | Performed by: PEDIATRICS

## 2022-09-12 PROCEDURE — 86850 RBC ANTIBODY SCREEN: CPT | Performed by: PEDIATRICS

## 2022-09-12 PROCEDURE — 86901 BLOOD TYPING SEROLOGIC RH(D): CPT | Performed by: PEDIATRICS

## 2022-09-12 PROCEDURE — 99213 OFFICE O/P EST LOW 20 MIN: CPT | Mod: GC | Performed by: PEDIATRICS

## 2022-09-12 ASSESSMENT — PAIN SCALES - GENERAL: PAINLEVEL: NO PAIN (0)

## 2022-09-12 NOTE — NURSING NOTE
Chief Complaint   Patient presents with     Blood Draw     Labs drawn via  by RN. VS taken.     Labs drawn with  by rn.  Pt tolerated well.  VS taken.  Pt checked in for next appt.    Jonel Elizondo RN

## 2022-09-12 NOTE — NURSING NOTE
"Oncology Rooming Note    September 12, 2022 1:45 PM   Ricky Pak is a 26 year old male who presents for:    Chief Complaint   Patient presents with     Blood Draw     Labs drawn via  by RN. VS taken.     Oncology Clinic Visit     Sickle cell     Initial Vitals: /80   Pulse 69   Temp 99.2  F (37.3  C) (Oral)   Resp 16   Wt 103.5 kg (228 lb 1.6 oz)   SpO2 100%   BMI 31.24 kg/m   Estimated body mass index is 31.24 kg/m  as calculated from the following:    Height as of 6/22/22: 1.82 m (5' 11.65\").    Weight as of this encounter: 103.5 kg (228 lb 1.6 oz). Body surface area is 2.29 meters squared.  No Pain (0) Comment: Data Unavailable   No LMP for male patient.  Allergies reviewed: Yes  Medications reviewed: Yes    Medications: Medication refills not needed today.  Pharmacy name entered into [employer]: [place], [place] - [address] [facility] [phone]    Clinical concerns: none       Tami Jaramillo"

## 2022-09-12 NOTE — LETTER
9/12/2022         RE: Ricky Pak  [address] [id number]  [facility] [address]        Dear Colleague,    Thank you for referring your patient, Ricky Pak, to the [facility]. Please see a copy of my visit note below.    Adult Sickle Cell Outpatient Clinic Note        Date of Visit: 09/12/2022    Ricky Pak is a 26 year old male who is being seen as a follow up for SCD care. He intermittently gets care in our system when his work schedules him in [place].  He started back here in June 2022 and plans to stay for 3 months.    Interval History  Shows side is planning on leaving [place] on September 23.  He states that his next work assignment will start in October in [facility].  He had an exchange transfusion last month, and is scheduled for his next transfusion 9/16/22.  He reports no issues with tolerating recent transfusions.      He reports that traditionally his sickle cell pain is felt in his extremities.  He cannot recall the last time he had pain like this, but estimates that it was over 2 years ago.  He was evaluated in the emergency room on 8/30/2022 for chest pain.  He states that the chest pain started while at work and consisted of a right sided pain that was sharp in nature.  Initially he thought that it was a work-related injury however pain persisted so he presented to the emergency room.  He tried to relieve pain with Aleve but did not experience relief.  He noted that the chest pain was constant but became more intense with movement and deep breaths.  He denies shortness of breath, cough, fever, or recent sick contacts.  Troponin was negative, and D-dimer was elevated (0.58).  CT PE was completed and showed no evidence of pulmonary embolus.  After emergency room visit he says that pain persisted for a day or 2 and then resolved.  He has not had similar pain episode since that time.    Since last visit he has been seen by optometry and has started wearing  glasses.    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 He was on monthly transfusions from [id number] with chelation and in 2013 he was switched to peripheral RBC exchange which has kept him in great health since then. He no longer needs chelation and is able to keep his HbS ~30% or below with regularity. He grew up in [place] and has spent most of his life in the [facility]. 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 He just got sent to [facility] and moved here one week ago. He denies regular pain and has not had a pain crisis since he was a teenager. He has kept up with his exchange transfusions and feels quite healthy overall. He regained the vision after having a macular infarct at age 10 and has no further neurologic symptoms since. He does not take any regular medications and has not had any opioids in many years either. He has no complaints today. He met with the apheresis team before this visit and is squared away to be maintained on his exchange schedule here.    ---------------------------------------  Ricky Pak's Goals (discussed 09/12/2022 )    1-3 month goal:      6 month goal:  Dental exam    12 month goal:  Find a place in the [place] that he would like to settle down permanently.  (He does not want to stay in [place] long-term)    Disease-specific goal(s):  Maintain good exchange regimens despite moving about the country  ---------------------------------------      Sickle Cell Disease Comprehensive Checklist    Bone Health/Avascular Necrosis Screening/Symptoms (each visit):  none    Leg Ulcer evaluation (every visit): , none    Hypertension (every visit):  None    Last ophthalmologic exam: Summer 2022    Last urinalysis for microalbuminuria/CKD (annually): Needs at next visit    Last pulmonary evaluation (asthma, WILFREDO, pulm HTN): unknown    Stroke/silent cerebral infarct Hx (Y/N): macular infarct ~2006, vision now normalized    Last PCP Visit: No established PCP    Vaccines:   Reported up to date. Flu shot (2021-22) from work    Plan last reviewed with patient: 12/20/21    Patient background: 27 yo male who is from [place], who initially came to [place] in late 2020. 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 No children or significant others    Sickle Cell Disease History  Primary Hematologist: Janiya  Genotype: SS  Acute Pain Crisis Treatment: (Opioid-naive)    ER/Acute Care/Infusion Clinic:   o Morphine 1 mg IVP/SC Q1H X 3 doses  o Other: Benadryl PO and Zofran 8 mg IV    Inpatient:  o Opioid: Morphine 1 mg IV Q1H PRN until PCA starts  o PCA plan:  - PCA button dose: Morphine 1 mg  - Lockout: 20 minutes  - Continuous Infusion: consider 0.5 mg/hr  o Other Medications: Benadryl, Zofran  o Supportive Care: Docusate, Senna  Chronic Pain Medications:    none  Baseline Hemoglobin: 12 mg/dl  Hydroxyurea use: no  H/O blood transfusions: Yes (partial exchange since 2013, full transfusion protocol 2006-13, now fully chelated)    H/O Transfusion Reactions: no    Antibodies: none known  H/O Acute Chest Syndrome: none    Last episode: n/a    ICU/intubation: no  H/O Stroke: Yes (macular infarct ~2006, vision normalized)  H/O VTE: no  H/O Cholecystectomy or Splenectomy: Cholecystectomy (2011)  H/O Asthma, Pulm HTN, AVN, Leg Ulcers, Nephropathy, Retinopathy, etc: none    Review Of Systems:  General: Good energy and appetite. No fevers. No concerns for pain.   HEENT: Improvement in vision since getting glasses.  Denies concerns with vision or hearing.  No yellowing of the whites of eyes.  Respiratory: No SOB or orthopnea. No  "cough. No wheezing.   Cardiovascular: No dizziness  or palpitations.   Endocrine: No hot/cold intolerance. No increase thirst or urination.   GI: No nausea/vomiting, diarrhea, constipation, or abdominal pain. No splenic pain  : No difficulty with urination. No hematuria.   Skin: No current rashes, bruises, petechiae or other skin lesions noted.   Neuro: No weakness or numbness. No HA.  MSK: No change in ROM or function.     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       Past Surgical History:  Past Surgical History:   Procedure Laterality Date     CHOLECYSTECTOMY N/A 2011     LIVER BIOPSY N/A 2007    iron overload monitoring     LIVER BIOPSY N/A 2008    monitoring for iron overload       Past Family History:   Family History   Problem Relation Age of Onset     Sickle Cell Trait Mother      Sickle Cell Trait Father      No Known Problems Sister      No Known Problems Sister      Sickle Cell Trait Brother      Breast Cancer Maternal Grandmother      Breast Cancer Maternal Great-Grandmother        Social History:   Social History     Socioeconomic History     Marital status: Single     Spouse name: Not on file     Number of children: Not on file     Years of education: Not on file     Highest education level: Not on file   Occupational History     Not on file   Tobacco Use     Smoking status: Never Smoker     Smokeless tobacco: Never Used   Substance and Sexual Activity     Alcohol use: Yes     Alcohol/week: 2.0 standard drinks     Types: 2 Standard drinks or equivalent per week     Comment: \"A few beers every other weekend.\"     Drug use: Never     Sexual activity: Not on file   Other Topics Concern "     Not on file   Social History Narrative    Recently moved to [place]. 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     Social Determinants of Health     Financial Resource Strain: Not on file   Food Insecurity: Not on file   Transportation Needs: Not on file   Physical Activity: Not on file   Stress: Not on file   Social Connections: Not on file   Intimate Partner Violence: Not At Risk     Fear of Current or Ex-Partner: No     Emotionally Abused: No     Physically Abused: No     Sexually Abused: No   Housing Stability: Not on file       Current Medications:    Current Outpatient Medications   Medication     acetaminophen-codeine (TYLENOL W/CODEINE #3) 300-30 MG per tablet     HYDROcodone-acetaminophen (NORCO) 5-325 MG tablet     No current facility-administered medications for this visit.           Physical Exam:  /80   Pulse 69   Temp 99.2  F (37.3  C) (Oral)   Resp 16   Wt 103.5 kg (228 lb 1.6 oz)   SpO2 100%   BMI 31.24 kg/m     Gen: healthy gentleman, conversant, no acute distress  HEENT: normocephalic, atraumatic, no icterus  LYMPH: no cervical adenopathy  CV: RRR, no murmurs, no reproducible chest wall tenderness  RESP: Nonlabored breathing on room air, clear to auscultation bilaterally   ABD: soft, nondistended, nontender to palpation, no organomegaly  MSK: normal gait, moves all 4 extremities equally and independently  Skin: No rashes or lesions on exposed skin  Neuro: Speech fluent mentation intact  Psych: Mood appropriate affect normal    Labs:   Latest Reference Range & Units 09/12/22 13:06   WBC 4.0 - 11.0 10e3/uL 8.1   Hemoglobin 13.3 - 17.7 g/dL 11.2 (L)   Hematocrit 40.0 - 53.0 % 33.7 (L)   Platelet Count 150 - 450 10e3/uL 559 (H)   RBC Count 4.40 - 5.90 10e6/uL 4.12 (L)   MCV 78 - 100 fL 82   MCH 26.5 - 33.0 pg 27.2   MCHC 31.5 - 36.5 g/dL 33.2   RDW 10.0 - 15.0 % 17.9 (H)   % Neutrophils % 58   % Lymphocytes % 27   % Monocytes % 9   % Eosinophils % 4   %  Basophils % 2   Absolute Basophils 0.0 - 0.2 10e3/uL 0.1   Absolute Eosinophils 0.0 - 0.7 10e3/uL 0.4   Absolute Immature Granulocytes <=0.4 10e3/uL 0.0   Absolute Lymphocytes 0.8 - 5.3 10e3/uL 2.2   Absolute Monocytes 0.0 - 1.3 10e3/uL 0.7   % Immature Granulocytes % 0   Absolute Neutrophils 1.6 - 8.3 10e3/uL 4.7   Absolute NRBCs 10e3/uL 0.1   NRBCs per 100 WBC <1 /100 1 (H)   % Retic 0.5 - 2.0 % 3.8 (H)   Absolute Retic 0.025 - 0.095 10e6/uL 0.156 (H)   (L): Data is abnormally low  (H): Data is abnormally high    Assessment:   Ricky Pak is a 26 year old male with HbSS Disease and a history of macular infarct who recently moved back to [place] for a third time for work (June 2022 through September 2022).  Since moving back to [place] he was restarted on exchange transfusion regimen (he was off prior to this while in [place] and was mildly symptomatic). He receives Atarax PO as a premed and generally gets 10 units via apheresis every 4 weeks via PIV. He had a hypotensive episode following a previous apheresis procedure here which was a depletion followed by an exchange so he does not get depletion any longer.    Discussed recent emergency room visit with patient.  History and symptomatology most consistent with costochondritis.  Explained to patient that this could be caused by vaso-occlusion and small vasculature.  At this time there are no further concerns for evaluation.  Offered prescription for pain medication at this time patient declines.    Plan:  1) Labs: CBC, BMP, retic at apheresis visit  2) Referral: Will help patient coordinate transfer of care to [facility] for exchange transfusions (Dr. Bradshaw, [facility])  3) Follow up: To be set up next time he is in [place], however he can reach out in the interim with questions or concerns.    Patient was seen by and discussed with attending physician Dr. Denson who agrees with assessment and plan.    Keily Diego MD  Hematology fellow    I, Manny Denson,  MD, saw this patient with the fellow and agree with the fellow's finding and plan of care as documented. I personally reviewed vital signs, medications, and labs and performed a pertinent physical exam. Key findings and additional documentation were highlighted in bold.    Duration of visit: 25 minutes    Manny Denson MD  Pediatric Hematologist  Division of Pediatric Hematology/Oncology  [facility]  Pager: [phone]        Again, thank you for allowing me to participate in the care of your patient.        Sincerely,        Manny Denson MD

## 2022-09-13 LAB
ABO/RH(D): NORMAL
ANTIBODY SCREEN: NEGATIVE
SPECIMEN EXPIRATION DATE: NORMAL

## 2022-09-14 ENCOUNTER — LAB (OUTPATIENT)
Dept: LAB | Facility: CLINIC | Age: 27
End: 2022-09-14
Attending: PEDIATRICS
Payer: COMMERCIAL

## 2022-09-14 DIAGNOSIS — D57.1 HB-SS DISEASE WITHOUT CRISIS (H): ICD-10-CM

## 2022-09-14 LAB
BASOPHILS # BLD AUTO: 0.1 10E3/UL (ref 0–0.2)
BASOPHILS NFR BLD AUTO: 2 %
EOSINOPHIL # BLD AUTO: 0.3 10E3/UL (ref 0–0.7)
EOSINOPHIL NFR BLD AUTO: 4 %
ERYTHROCYTE [DISTWIDTH] IN BLOOD BY AUTOMATED COUNT: 18.2 % (ref 10–15)
HCT VFR BLD AUTO: 31.8 % (ref 40–53)
HGB BLD-MCNC: 10.6 G/DL (ref 13.3–17.7)
HGB S BLD QL: NORMAL
IMM GRANULOCYTES # BLD: 0 10E3/UL
IMM GRANULOCYTES NFR BLD: 0 %
LYMPHOCYTES # BLD AUTO: 1.6 10E3/UL (ref 0.8–5.3)
LYMPHOCYTES NFR BLD AUTO: 24 %
MCH RBC QN AUTO: 27.1 PG (ref 26.5–33)
MCHC RBC AUTO-ENTMCNC: 33.3 G/DL (ref 31.5–36.5)
MCV RBC AUTO: 81 FL (ref 78–100)
MONOCYTES # BLD AUTO: 0.7 10E3/UL (ref 0–1.3)
MONOCYTES NFR BLD AUTO: 11 %
NEUTROPHILS # BLD AUTO: 3.9 10E3/UL (ref 1.6–8.3)
NEUTROPHILS NFR BLD AUTO: 59 %
NRBC # BLD AUTO: 0 10E3/UL
NRBC BLD AUTO-RTO: 0 /100
PLATELET # BLD AUTO: 534 10E3/UL (ref 150–450)
RBC # BLD AUTO: 3.91 10E6/UL (ref 4.4–5.9)
WBC # BLD AUTO: 6.7 10E3/UL (ref 4–11)

## 2022-09-14 PROCEDURE — 36415 COLL VENOUS BLD VENIPUNCTURE: CPT

## 2022-09-14 PROCEDURE — 86923 COMPATIBILITY TEST ELECTRIC: CPT

## 2022-09-14 PROCEDURE — 86901 BLOOD TYPING SEROLOGIC RH(D): CPT

## 2022-09-14 PROCEDURE — 85025 COMPLETE CBC W/AUTO DIFF WBC: CPT

## 2022-09-14 NOTE — NURSING NOTE
Chief Complaint   Patient presents with     Labs Only     Venipuncture labs     Pt here today for lab only appointment. Labs drawn via venipuncture in right AC with 23g butterfly needle.     Olivia Mcgee RN on 9/14/2022 at 8:36 AM

## 2022-09-14 NOTE — PROGRESS NOTES
Adult Sickle Cell Outpatient Clinic Note        Date of Visit: 09/12/2022    Ricky Pak is a 26 year old male who is being seen as a follow up for SCD care. He intermittently gets care in our system when his work schedules him in [place].  He started back here in June 2022 and plans to stay for 3 months.    Interval History  Shows side is planning on leaving [place] on September 23.  He states that his next work assignment will start in October in [employer].  He had an exchange transfusion last month, and is scheduled for his next transfusion 9/16/22.  He reports no issues with tolerating recent transfusions.      He reports that traditionally his sickle cell pain is felt in his extremities.  He cannot recall the last time he had pain like this, but estimates that it was over 2 years ago.  He was evaluated in the emergency room on 8/30/2022 for chest pain.  He states that the chest pain started while at work and consisted of a right sided pain that was sharp in nature.  Initially he thought that it was a work-related injury however pain persisted so he presented to the emergency room.  He tried to relieve pain with Aleve but did not experience relief.  He noted that the chest pain was constant but became more intense with movement and deep breaths.  He denies shortness of breath, cough, fever, or recent sick contacts.  Troponin was negative, and D-dimer was elevated (0.58).  CT PE was completed and showed no evidence of pulmonary embolus.  After emergency room visit he says that pain persisted for a day or 2 and then resolved.  He has not had similar pain episode since that time.    Since last visit he has been seen by optometry and has started wearing glasses.    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 He was on monthly transfusions from  9742-3070 with chelation and in 2013 he was switched to peripheral RBC exchange which has kept him in great health since then. He no longer needs chelation and is able to keep his HbS ~30% or below with regularity. He grew up in [place] and has spent most of his life in the [facility]. 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 He just got sent to [facility] and moved here one week ago. He denies regular pain and has not had a pain crisis since he was a teenager. He has kept up with his exchange transfusions and feels quite healthy overall. He regained the vision after having a macular infarct at age 10 and has no further neurologic symptoms since. He does not take any regular medications and has not had any opioids in many years either. He has no complaints today. He met with the apheresis team before this visit and is squared away to be maintained on his exchange schedule here.    ---------------------------------------  Ricky Pak's Goals (discussed 09/12/2022 )    1-3 month goal:      6 month goal:  Dental exam    12 month goal:  Find a place in the [place] that he would like to settle down permanently.  (He does not want to stay in [place] long-term)    Disease-specific goal(s):  Maintain good exchange regimens despite moving about the country  ---------------------------------------      Sickle Cell Disease Comprehensive Checklist    Bone Health/Avascular Necrosis Screening/Symptoms (each visit):  none    Leg Ulcer evaluation (every visit): , none    Hypertension (every visit): None    Last ophthalmologic exam: Summer 2022    Last urinalysis for microalbuminuria/CKD (annually): Needs at next visit    Last pulmonary evaluation (asthma, WILFREDO, pulm HTN): unknown    Stroke/silent cerebral infarct Hx (Y/N): macular infarct ~2006, vision  now normalized    Last PCP Visit: No established PCP    Vaccines:   Reported up to date. Flu shot (2021-22) from work    Plan last reviewed with patient: 1/24/2023    Patient background: 28 yo male who recently moved from [place] to [place] in late 2020. 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 No children or significant others    Sickle Cell Disease History  Primary Hematologist: Janiya  Genotype: SS  Acute Pain Crisis Treatment: (Opioid-naive)    ER/Acute Care/Infusion Clinic:   o Morphine 1 mg IVP/SC Q1H X 3 doses  o Toradol 30 mg IV x1  o LR 1 L  o Other: Zofran 8 mg IV    Inpatient:  o Opioid: Morphine 1 mg IV Q1H PRN until PCA starts  o Toradol 30 mg q6h x 3-4 days  o PCA plan:  - PCA button dose: Morphine 1 mg  - Lockout: 20 minutes  - Continuous Infusion: consider 0.5 mg/hr  o Other Medications: Robaxin PRN, Zofran  o Supportive Care: Docusate, Senna  Chronic Pain Medications:    none  Baseline Hemoglobin: 12 mg/dl  Hydroxyurea use: no  H/O blood transfusions: Yes (partial exchange since 2013, full transfusion protocol 2006-13, now fully chelated)    H/O Transfusion Reactions: no    Antibodies: none known  H/O Acute Chest Syndrome: none    Last episode: n/a    ICU/intubation: no  H/O Stroke: Yes (macular infarct ~2006, vision normalized)  H/O VTE: no  H/O Cholecystectomy or Splenectomy: Cholecystectomy (2011)  H/O Asthma, Pulm HTN, AVN, Leg Ulcers, Nephropathy, Retinopathy, etc: none      Review Of Systems:  General: Good energy and appetite. No fevers. No concerns for pain.   HEENT: Improvement in vision since getting glasses.  Denies concerns with vision or hearing.  No yellowing of the whites of eyes.  Respiratory: No SOB or orthopnea. No cough. No wheezing.   Cardiovascular: No dizziness  or palpitations.   Endocrine: No hot/cold intolerance. No increase thirst or urination.   GI: No nausea/vomiting, diarrhea, constipation, or abdominal pain. No splenic pain  : No  "difficulty with urination. No hematuria.   Skin: No current rashes, bruises, petechiae or other skin lesions noted.   Neuro: No weakness or numbness. No HA.  MSK: No change in ROM or function.     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       Past Surgical History:  Past Surgical History:   Procedure Laterality Date     CHOLECYSTECTOMY N/A 2011     LIVER BIOPSY N/A 2007    iron overload monitoring     LIVER BIOPSY N/A 2008    monitoring for iron overload       Past Family History:   Family History   Problem Relation Age of Onset     Sickle Cell Trait Mother      Sickle Cell Trait Father      No Known Problems Sister      No Known Problems Sister      Sickle Cell Trait Brother      Breast Cancer Maternal Grandmother      Breast Cancer Maternal Great-Grandmother        Social History:   Social History     Socioeconomic History     Marital status: Single     Spouse name: Not on file     Number of children: Not on file     Years of education: Not on file     Highest education level: Not on file   Occupational History     Not on file   Tobacco Use     Smoking status: Never Smoker     Smokeless tobacco: Never Used   Substance and Sexual Activity     Alcohol use: Yes     Alcohol/week: 2.0 standard drinks     Types: 2 Standard drinks or equivalent per week     Comment: \"A few beers every other weekend.\"     Drug use: Never     Sexual activity: Not on file   Other Topics Concern     Not on file   Social History Narrative    Recently moved to [place]. 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     Social Determinants of Health     Financial Resource Strain: Not on file   Food " Insecurity: Not on file   Transportation Needs: Not on file   Physical Activity: Not on file   Stress: Not on file   Social Connections: Not on file   Intimate Partner Violence: Not At Risk     Fear of Current or Ex-Partner: No     Emotionally Abused: No     Physically Abused: No     Sexually Abused: No   Housing Stability: Not on file       Current Medications:    Current Outpatient Medications   Medication     acetaminophen-codeine (TYLENOL W/CODEINE #3) 300-30 MG per tablet     HYDROcodone-acetaminophen (NORCO) 5-325 MG tablet     No current facility-administered medications for this visit.           Physical Exam:  /80   Pulse 69   Temp 99.2  F (37.3  C) (Oral)   Resp 16   Wt 103.5 kg (228 lb 1.6 oz)   SpO2 100%   BMI 31.24 kg/m     Gen: healthy gentleman, conversant, no acute distress  HEENT: normocephalic, atraumatic, no icterus  LYMPH: no cervical adenopathy  CV: RRR, no murmurs, no reproducible chest wall tenderness  RESP: Nonlabored breathing on room air, clear to auscultation bilaterally   ABD: soft, nondistended, nontender to palpation, no organomegaly  MSK: normal gait, moves all 4 extremities equally and independently  Skin: No rashes or lesions on exposed skin  Neuro: Speech fluent mentation intact  Psych: Mood appropriate affect normal    Labs:   Latest Reference Range & Units 09/12/22 13:06   WBC 4.0 - 11.0 10e3/uL 8.1   Hemoglobin 13.3 - 17.7 g/dL 11.2 (L)   Hematocrit 40.0 - 53.0 % 33.7 (L)   Platelet Count 150 - 450 10e3/uL 559 (H)   RBC Count 4.40 - 5.90 10e6/uL 4.12 (L)   MCV 78 - 100 fL 82   MCH 26.5 - 33.0 pg 27.2   MCHC 31.5 - 36.5 g/dL 33.2   RDW 10.0 - 15.0 % 17.9 (H)   % Neutrophils % 58   % Lymphocytes % 27   % Monocytes % 9   % Eosinophils % 4   % Basophils % 2   Absolute Basophils 0.0 - 0.2 10e3/uL 0.1   Absolute Eosinophils 0.0 - 0.7 10e3/uL 0.4   Absolute Immature Granulocytes <=0.4 10e3/uL 0.0   Absolute Lymphocytes 0.8 - 5.3 10e3/uL 2.2   Absolute Monocytes 0.0 - 1.3  10e3/uL 0.7   % Immature Granulocytes % 0   Absolute Neutrophils 1.6 - 8.3 10e3/uL 4.7   Absolute NRBCs 10e3/uL 0.1   NRBCs per 100 WBC <1 /100 1 (H)   % Retic 0.5 - 2.0 % 3.8 (H)   Absolute Retic 0.025 - 0.095 10e6/uL 0.156 (H)   (L): Data is abnormally low  (H): Data is abnormally high    Assessment:   Ricky Pak is a 26 year old male with HbSS Disease and a history of macular infarct who recently moved back to [place] for a third time for work (June 2022 through September 2022).  Since moving back to [place] he was restarted on exchange transfusion regimen (he was off prior to this while in [place] and was mildly symptomatic). He receives Atarax PO as a premed and generally gets 10 units via apheresis every 4 weeks via PIV. He had a hypotensive episode following a previous apheresis procedure here which was a depletion followed by an exchange so he does not get depletion any longer.    Discussed recent emergency room visit with patient.  History and symptomatology most consistent with costochondritis.  Explained to patient that this could be caused by vaso-occlusion and small vasculature.  At this time there are no further concerns for evaluation.  Offered prescription for pain medication at this time patient declines.    Plan:  1) Labs: CBC, BMP, retic at apheresis visit  2) Referral: Will help patient coordinate transfer of care to [facility] for exchange transfusions (Dr. Bradshaw, [facility])  3) Follow up: To be set up next time he is in [place], however he can reach out in the interim with questions or concerns.    Patient was seen by and discussed with attending physician Dr. Denson who agrees with assessment and plan.    Keily Diego MD  Hematology fellow    I, Manny Denson MD, saw this patient with the fellow and agree with the fellow's finding and plan of care as documented. I personally reviewed vital signs, medications, and labs and performed a pertinent physical exam. Key findings and  additional documentation were highlighted in bold.    Duration of visit: 25 minutes    Manny Denson MD  Pediatric Hematologist  Division of Pediatric Hematology/Oncology  [facility]  Pager: [phone]    Addendum: updated pain plan 1/25/23 to account for admission at [facility] ([facility]), discussed with Ricky

## 2022-09-15 ENCOUNTER — PATIENT OUTREACH (OUTPATIENT)
Dept: ONCOLOGY | Facility: CLINIC | Age: 27
End: 2022-09-15

## 2022-09-15 RX ORDER — HYDROXYZINE HYDROCHLORIDE 25 MG/1
25 TABLET, FILM COATED ORAL ONCE
Status: CANCELLED | OUTPATIENT
Start: 2022-09-16

## 2022-09-15 NOTE — PROGRESS NOTES
[facility]: Cancer Care                                                                                          Referral and face sheet faxed to [facility]. Awaiting signed NICKI from pt to send notes.         Signature:  Paula Viera RN

## 2022-09-16 ENCOUNTER — HOSPITAL ENCOUNTER (OUTPATIENT)
Dept: LAB | Facility: CLINIC | Age: 27
Discharge: HOME | End: 2022-09-16
Attending: PEDIATRICS | Admitting: PEDIATRICS
Payer: COMMERCIAL

## 2022-09-16 ENCOUNTER — PATIENT OUTREACH (OUTPATIENT)
Dept: ONCOLOGY | Facility: CLINIC | Age: 27
End: 2022-09-16

## 2022-09-16 VITALS
TEMPERATURE: 98.4 F | RESPIRATION RATE: 20 BRPM | HEART RATE: 71 BPM | WEIGHT: 229.94 LBS | DIASTOLIC BLOOD PRESSURE: 74 MMHG | SYSTOLIC BLOOD PRESSURE: 117 MMHG | BODY MASS INDEX: 31.49 KG/M2

## 2022-09-16 LAB
HCT VFR BLD AUTO: 29.9 % (ref 40–53)
HCT VFR BLD AUTO: 30 % (ref 40–53)
HGB BLD-MCNC: 10 G/DL (ref 13.3–17.7)
HGB BLD-MCNC: 10.3 G/DL (ref 13.3–17.7)

## 2022-09-16 PROCEDURE — 36512 APHERESIS RBC: CPT

## 2022-09-16 PROCEDURE — 36415 COLL VENOUS BLD VENIPUNCTURE: CPT | Performed by: PATHOLOGY

## 2022-09-16 PROCEDURE — 999N000127 HC STATISTIC PERIPHERAL IV START W US GUIDANCE

## 2022-09-16 PROCEDURE — 85014 HEMATOCRIT: CPT | Performed by: PATHOLOGY

## 2022-09-16 PROCEDURE — 83021 HEMOGLOBIN CHROMOTOGRAPHY: CPT | Performed by: PATHOLOGY

## 2022-09-16 PROCEDURE — 250N000009 HC RX 250: Performed by: PATHOLOGY

## 2022-09-16 PROCEDURE — P9016 RBC LEUKOCYTES REDUCED: HCPCS

## 2022-09-16 PROCEDURE — 250N000013 HC RX MED GY IP 250 OP 250 PS 637: Performed by: PATHOLOGY

## 2022-09-16 RX ORDER — HYDROXYZINE HYDROCHLORIDE 25 MG/1
25 TABLET, FILM COATED ORAL ONCE
Status: COMPLETED | OUTPATIENT
Start: 2022-09-16 | End: 2022-09-16

## 2022-09-16 RX ADMIN — HYDROXYZINE HYDROCHLORIDE 25 MG: 25 TABLET, FILM COATED ORAL at 08:37

## 2022-09-16 RX ADMIN — ANTICOAGULANT CITRATE DEXTROSE SOLUTION FORMULA A 489 ML: 12.25; 11; 3.65 SOLUTION INTRAVENOUS at 09:00

## 2022-09-16 NOTE — PROCEDURES
Transfusion Medicine Procedure - RBC Exchange    Ricky Pak MRN# [id number]   YOB: 1995 Age: 26 year old   Date of Admission: 9/16/2022     Procedure: RBC Exchange           Assessment and Plan:   Ricky Pak is a 26 year old male with SCD who underwent maintenance RBC exchange. Peripheral veins were used for access.  He was pre-medicated with Atarax 25 mg po.  He tolerated the procedure well, resting through much of it.  His starting hematocrit was 30.0%  And post-RBC exchange his hematocrit was 29.9%.              History of Present Illness:   Ricky Pak is a 26 year old male  who presents for RBC exchange. 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     He began apheresis-based RBC exchanges around 2013 following his issues with iron overload.  Per previous documentation, these exchanges have done an excellent job preventing sickle cell crises. He had a hypotensive episode following a previous apheresis procedure here which was a depletion followed by an exchange. Therefore, we plan to proceed with the exchange alone approach for all RBC exchanges for him.              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             Past Surgical History:     Past Surgical History:   Procedure Laterality Date     CHOLECYSTECTOMY N/A 2011     LIVER BIOPSY N/A 2007    iron overload monitoring     LIVER BIOPSY N/A 2008    monitoring for iron overload              Social History:     Social History     Tobacco Use     Smoking status: Never Smoker  "    Smokeless tobacco: Never Used   Substance Use Topics     Alcohol use: Yes     Alcohol/week: 2.0 standard drinks     Types: 2 Standard drinks or equivalent per week     Comment: \"A few beers every other weekend.\"             Family History:     Family History   Problem Relation Age of Onset     Sickle Cell Trait Mother      Sickle Cell Trait Father      No Known Problems Sister      No Known Problems Sister      Sickle Cell Trait Brother      Breast Cancer Maternal Grandmother      Breast Cancer Maternal Great-Grandmother              Immunizations:     Immunization History   Administered Date(s) Administered     COVID-19,PF,Pfizer (12+ Yrs) 04/02/2021, 04/23/2021             Allergies:     Allergies   Allergen Reactions     Diphenhydramine Hives             Medications:     Current Outpatient Medications   Medication Sig     acetaminophen-codeine (TYLENOL W/CODEINE #3) 300-30 MG per tablet Take 1-2 tablets by mouth as needed     HYDROcodone-acetaminophen (NORCO) 5-325 MG tablet Take 1-2 tablets by mouth as needed     No current facility-administered medications for this encounter.                   Vital Signs:   Vitals were reviewed  Temp: 98.4  F (36.9  C) Temp src: Oral BP: 117/74 Pulse: 71   Resp: 20                   Data:      Blood type Rh(D)   O POS RH(D)   Date Value Ref Range Status   01/12/2021 Pos  Final         Last CBC:  Lab Results   Component Value Date    WBC 6.7 09/14/2022    HGB 10.0 (L) 09/16/2022    HCT 29.9 (L) 09/16/2022    MCV 81 09/14/2022     (H) 09/14/2022          Procedure Summary:   A RBC exchange was performed with a Spectra Optia cell separator.  The vascular access was peripheral IV.  ACD-A was used for anticoagulation. The replacement fluid was 10 RBC units.  The patient's vital signs were stable throughout the procedure.  The patient tolerated the procedure well.    ATTESTATION STATEMENT:   During the procedure this patient was directly seen and evaluated by Sofi henry " RJ Ochoa MD, PhD.      Sofi Ochoa MD, PhD  Transfusion Medicine Attending  Medical Director, Blood Bank Laboratory  Pager [phone]

## 2022-09-16 NOTE — PROGRESS NOTES
[facility]: Cancer Care                                                                                          Signed NICKI faxed to [facility] to send records to [phone].     Signature:  Paula Viera RN

## 2022-09-16 NOTE — DISCHARGE INSTRUCTIONS
Apheresis Blood Donor Center Post Instructions  You may feel tired after your procedure today.   Please call your doctor if you have:  bleeding that doesn t stop, fever, pain where a needle/IV's were placed, seizures, trouble breathing, red urine, nausea or vomiting, other health concerns.     If your symptoms are severe, call [phone].  Your veins were used, keep the bandages on for 2-4 hours.  Avoid heavy lifting with your arms.  If bleeding occurs from these sites, apply firm pressure for 5-10 minutes.  Call your physician if bleeding continues.    The Apheresis/Blood Donor Center is open Monday-Friday 7:30 a.m. to 5 p.m.  The phone number is [phone].  A Transfusion Medicine physician can be reached after 5:00 p.m. weekdays and on weekends /Holidays by calling [phone], and asking for the physician on call.      Red blood cell exchange:   You received blood products (red blood cells) as part of your treatment, you need to be aware that transfusion reactions can occur up to several hours after they have been given to you.  Call your physician if you experience any symptoms in the next 48 hours, including breathing problems, rash, itching, hives, nausea or vomiting, fever or chills, blood in your urine or stools, or joint pain.  Please inform the Transfusion Medicine Physician by calling [phone] and asking for the physician on call.

## 2022-09-18 LAB
HGB S BLD QL: NORMAL
HGB S BLD QL: NORMAL

## 2023-01-31 LAB
ABO/RH(D): NORMAL
ANTIBODY SCREEN: NEGATIVE
SPECIMEN EXPIRATION DATE: NORMAL

## 2023-02-01 ENCOUNTER — LAB (OUTPATIENT)
Dept: LAB | Facility: CLINIC | Age: 28
End: 2023-02-01
Attending: PEDIATRICS
Payer: COMMERCIAL

## 2023-02-01 DIAGNOSIS — D57.1 HB-SS DISEASE WITHOUT CRISIS (H): ICD-10-CM

## 2023-02-01 LAB
BASOPHILS # BLD AUTO: 0.1 10E3/UL (ref 0–0.2)
BASOPHILS NFR BLD AUTO: 1 %
EOSINOPHIL # BLD AUTO: 1.7 10E3/UL (ref 0–0.7)
EOSINOPHIL NFR BLD AUTO: 11 %
ERYTHROCYTE [DISTWIDTH] IN BLOOD BY AUTOMATED COUNT: 25 % (ref 10–15)
HCT VFR BLD AUTO: 24.2 % (ref 40–53)
HGB BLD-MCNC: 8.3 G/DL (ref 13.3–17.7)
IMM GRANULOCYTES # BLD: 0.2 10E3/UL
IMM GRANULOCYTES NFR BLD: 1 %
LYMPHOCYTES # BLD AUTO: 3.2 10E3/UL (ref 0.8–5.3)
LYMPHOCYTES NFR BLD AUTO: 22 %
MCH RBC QN AUTO: 30.1 PG (ref 26.5–33)
MCHC RBC AUTO-ENTMCNC: 34.3 G/DL (ref 31.5–36.5)
MCV RBC AUTO: 88 FL (ref 78–100)
MONOCYTES # BLD AUTO: 1.4 10E3/UL (ref 0–1.3)
MONOCYTES NFR BLD AUTO: 10 %
NEUTROPHILS # BLD AUTO: 8.1 10E3/UL (ref 1.6–8.3)
NEUTROPHILS NFR BLD AUTO: 55 %
NRBC # BLD AUTO: 0.8 10E3/UL
NRBC BLD AUTO-RTO: 5 /100
PLATELET # BLD AUTO: 402 10E3/UL (ref 150–450)
RBC # BLD AUTO: 2.76 10E6/UL (ref 4.4–5.9)
RETICS # AUTO: 0.35 10E6/UL (ref 0.03–0.1)
RETICS/RBC NFR AUTO: 12.7 % (ref 0.5–2)
WBC # BLD AUTO: 14.7 10E3/UL (ref 4–11)

## 2023-02-01 PROCEDURE — 83021 HEMOGLOBIN CHROMOTOGRAPHY: CPT

## 2023-02-01 PROCEDURE — 86850 RBC ANTIBODY SCREEN: CPT

## 2023-02-01 PROCEDURE — 86901 BLOOD TYPING SEROLOGIC RH(D): CPT

## 2023-02-01 PROCEDURE — 36415 COLL VENOUS BLD VENIPUNCTURE: CPT

## 2023-02-01 PROCEDURE — 86923 COMPATIBILITY TEST ELECTRIC: CPT

## 2023-02-01 PROCEDURE — 85045 AUTOMATED RETICULOCYTE COUNT: CPT

## 2023-02-01 PROCEDURE — 85025 COMPLETE CBC W/AUTO DIFF WBC: CPT

## 2023-02-01 NOTE — NURSING NOTE
Chief Complaint   Patient presents with     Lab Only     Labs drawn with  by rn.     Labs drawn with  by rn.  Pt tolerated well.     Sheila Ferrer RN

## 2023-02-02 RX ORDER — HYDROXYZINE HYDROCHLORIDE 25 MG/1
25 TABLET, FILM COATED ORAL ONCE
Status: CANCELLED | OUTPATIENT
Start: 2023-02-02

## 2023-02-03 ENCOUNTER — HOSPITAL ENCOUNTER (OUTPATIENT)
Dept: LAB | Facility: CLINIC | Age: 28
Discharge: HOME | End: 2023-02-03
Attending: PEDIATRICS | Admitting: PEDIATRICS
Payer: COMMERCIAL

## 2023-02-03 VITALS
BODY MASS INDEX: 30.55 KG/M2 | RESPIRATION RATE: 18 BRPM | SYSTOLIC BLOOD PRESSURE: 130 MMHG | TEMPERATURE: 98.9 F | DIASTOLIC BLOOD PRESSURE: 82 MMHG | HEART RATE: 91 BPM | WEIGHT: 223.11 LBS

## 2023-02-03 LAB
HCT VFR BLD AUTO: 23.7 % (ref 40–53)
HCT VFR BLD AUTO: 25 % (ref 40–53)
HGB BLD-MCNC: 8.2 G/DL (ref 13.3–17.7)
HGB BLD-MCNC: 8.7 G/DL (ref 13.3–17.7)
HGB S BLD QL: NORMAL

## 2023-02-03 PROCEDURE — 36512 APHERESIS RBC: CPT

## 2023-02-03 PROCEDURE — 999N000127 HC STATISTIC PERIPHERAL IV START W US GUIDANCE

## 2023-02-03 PROCEDURE — 250N000009 HC RX 250: Performed by: PATHOLOGY

## 2023-02-03 PROCEDURE — 85014 HEMATOCRIT: CPT | Performed by: PATHOLOGY

## 2023-02-03 PROCEDURE — 250N000013 HC RX MED GY IP 250 OP 250 PS 637: Performed by: PATHOLOGY

## 2023-02-03 PROCEDURE — P9016 RBC LEUKOCYTES REDUCED: HCPCS

## 2023-02-03 PROCEDURE — 36415 COLL VENOUS BLD VENIPUNCTURE: CPT | Performed by: PATHOLOGY

## 2023-02-03 PROCEDURE — 36592 COLLECT BLOOD FROM PICC: CPT | Performed by: PATHOLOGY

## 2023-02-03 PROCEDURE — 83021 HEMOGLOBIN CHROMOTOGRAPHY: CPT | Performed by: PATHOLOGY

## 2023-02-03 RX ORDER — HYDROXYZINE HYDROCHLORIDE 25 MG/1
25 TABLET, FILM COATED ORAL ONCE
Status: COMPLETED | OUTPATIENT
Start: 2023-02-03 | End: 2023-02-03

## 2023-02-03 RX ORDER — ACETAMINOPHEN 325 MG/1
650 TABLET ORAL ONCE
Status: COMPLETED | OUTPATIENT
Start: 2023-02-03 | End: 2023-02-03

## 2023-02-03 RX ADMIN — ACETAMINOPHEN 650 MG: 325 TABLET ORAL at 09:16

## 2023-02-03 RX ADMIN — ANTICOAGULANT CITRATE DEXTROSE SOLUTION FORMULA A 618 ML: 12.25; 11; 3.65 SOLUTION INTRAVENOUS at 09:50

## 2023-02-03 RX ADMIN — HYDROXYZINE HYDROCHLORIDE 25 MG: 25 TABLET, FILM COATED ORAL at 08:35

## 2023-02-03 NOTE — PROCEDURES
Transfusion Medicine  Apheresis Procedure Note    Ricky Pak [id number]   YOB: 1995 Age: 27 year old         Procedure:  Red blood cell exchange (RBCX)           Assessment and Plan:   Ricky Pak is a 26 year old male with SCD who underwent maintenance RBC exchange. Peripheral veins were used for access.  He was pre-medicated with Atarax 25 mg po.  He tolerated the procedure well, resting through much of it.     He travels around for work, and will get exchanges in different locations.  He notes that he has not had a red blood cell exchange since he was last here.  He has been having more issues with pain.  He notes that his pain is OK today.  Denies fevers, chills.                 History of Present Illness:   Ricky Pak is a 26 year old male  who presents for RBC exchange. 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     He began apheresis-based RBC exchanges around 2013 following his issues with iron overload.  Per previous documentation, these exchanges have done an excellent job preventing sickle cell crises. He had a hypotensive episode following a previous apheresis procedure here which was a depletion followed by an exchange. Therefore, we plan to proceed with the exchange alone approach for all RBC exchanges for him              Past Medical History:     Past Medical History:   Diagnosis Date     Gallstones 2011    s/p cholecystectomy     Hb-SS disease without crisis (H)      History of CVA (cerebrovascular accident) 07/2006    macular infarct, but regained vision     Iron overload due to repeated red blood cell transfusions 2013    s/p chelation     Sleep apnea     Treated with CPAP.             Past Surgical History:     Past Surgical History:   Procedure Laterality Date     CHOLECYSTECTOMY N/A 2011     LIVER BIOPSY N/A 2007    iron  overload monitoring     LIVER BIOPSY N/A 2008    monitoring for iron overload             Allergies:     Allergies   Allergen Reactions     Diphenhydramine Hives             Medications:     Current Outpatient Medications   Medication Sig     acetaminophen-codeine (TYLENOL W/CODEINE #3) 300-30 MG per tablet Take 1-2 tablets by mouth as needed     HYDROcodone-acetaminophen (NORCO) 5-325 MG tablet Take 1-2 tablets by mouth as needed     No current facility-administered medications for this encounter.             Review of Systems:   See above           Exam:   /82   Pulse 91   Temp 98.9  F (37.2  C) (Oral)   Resp 18   Wt 101.2 kg (223 lb 1.7 oz)   BMI 30.55 kg/m    Alert, no apparent distress  Breathing appears comfortable on room air  Peripheral IV access for the procedure.            Data:     Results for orders placed or performed during the hospital encounter of 02/03/23   Hemoglobin and hematocrit     Status: Abnormal   Result Value Ref Range    Hemoglobin 8.2 (L) 13.3 - 17.7 g/dL    Hematocrit 23.7 (L) 40.0 - 53.0 %   Hemoglobin and hematocrit     Status: Abnormal   Result Value Ref Range    Hemoglobin 8.7 (L) 13.3 - 17.7 g/dL    Hematocrit 25.0 (L) 40.0 - 53.0 %         CBCRecent Labs   Lab 02/03/23  1223 02/03/23  0949 02/01/23  1135   WBC  --   --  14.7*   RBC  --   --  2.76*   HGB 8.7* 8.2* 8.3*   HCT 25.0* 23.7* 24.2*   MCV  --   --  88   MCH  --   --  30.1   MCHC  --   --  34.3   RDW  --   --  25.0*   PLT  --   --  402           Procedure Summary:   A red blood cell exchange was performed using donor red blood cells as the replacement product.    Peripheral veins were used for access and allowed for appropriate flow during the procedure.  ACD-A was used for anticoagulation.   The patient's vital signs were stable throughout.  The patient tolerated the procedure well without complication.  See apheresis flowsheet for additional details.        Attestation: During the procedure the patient was  directly seen and evaluated by me, Maikel Del Toro MD.    Maikel Del Toro MD  Transfusion Medicine Attending  Laboratory Medicine & Pathology  Pager: [phone]

## 2023-02-03 NOTE — DISCHARGE INSTRUCTIONS
Apheresis Blood Donor Center Post Instructions  You may feel tired after your procedure today.   Please call your doctor if you have:  bleeding that doesn t stop, fever, pain where a needle or tube (catheter) was placed, seizures, trouble breathing, red urine, nausea or vomiting, other health concerns.     If your symptoms are severe, call [phone].  Your veins were used, keep the bandages on for 2-4 hours.  Avoid heavy lifting with your arms.  If bleeding occurs from these sites, apply firm pressure for 5-10 minutes.  Call your physician if bleeding continues.    The Apheresis/Blood Donor Center is open Monday-Friday 7:30 a.m. to 5 p.m.  The phone number is [phone].  A Transfusion Medicine physician can be reached after 5:00 p.m. weekdays and on weekends /Holidays by calling [phone], and asking for the physician on call.      Red blood cell exchange:   You received blood products (red blood cells) as part of your treatment, you need to be aware that transfusion reactions can occur up to several hours after they have been given to you.  Call your physician if you experience any symptoms in the next 48 hours, including breathing problems, rash, itching, hives, nausea or vomiting, fever or chills, blood in your urine or stools, or joint pain.  Please inform the Transfusion Medicine Physician by calling [phone] and asking for the physician on call.

## 2023-02-05 LAB
HGB S BLD QL: NORMAL
HGB S BLD QL: NORMAL

## 2023-03-02 LAB
ABO/RH(D): NORMAL
ANTIBODY SCREEN: NEGATIVE
SPECIMEN EXPIRATION DATE: NORMAL

## 2023-03-03 ENCOUNTER — LAB (OUTPATIENT)
Dept: LAB | Facility: CLINIC | Age: 28
End: 2023-03-03
Attending: PEDIATRICS
Payer: COMMERCIAL

## 2023-03-03 DIAGNOSIS — D57.1 HB-SS DISEASE WITHOUT CRISIS (H): ICD-10-CM

## 2023-03-03 LAB
BASOPHILS # BLD AUTO: 0.1 10E3/UL (ref 0–0.2)
BASOPHILS NFR BLD AUTO: 1 %
EOSINOPHIL # BLD AUTO: 0.7 10E3/UL (ref 0–0.7)
EOSINOPHIL NFR BLD AUTO: 8 %
ERYTHROCYTE [DISTWIDTH] IN BLOOD BY AUTOMATED COUNT: 19.3 % (ref 10–15)
HCT VFR BLD AUTO: 30.2 % (ref 40–53)
HGB BLD-MCNC: 10.3 G/DL (ref 13.3–17.7)
IMM GRANULOCYTES # BLD: 0 10E3/UL
IMM GRANULOCYTES NFR BLD: 0 %
LYMPHOCYTES # BLD AUTO: 2.6 10E3/UL (ref 0.8–5.3)
LYMPHOCYTES NFR BLD AUTO: 32 %
MCH RBC QN AUTO: 28.2 PG (ref 26.5–33)
MCHC RBC AUTO-ENTMCNC: 34.1 G/DL (ref 31.5–36.5)
MCV RBC AUTO: 83 FL (ref 78–100)
MONOCYTES # BLD AUTO: 0.7 10E3/UL (ref 0–1.3)
MONOCYTES NFR BLD AUTO: 8 %
NEUTROPHILS # BLD AUTO: 4 10E3/UL (ref 1.6–8.3)
NEUTROPHILS NFR BLD AUTO: 51 %
NRBC # BLD AUTO: 0 10E3/UL
NRBC BLD AUTO-RTO: 0 /100
PLATELET # BLD AUTO: 401 10E3/UL (ref 150–450)
RBC # BLD AUTO: 3.65 10E6/UL (ref 4.4–5.9)
RETICS # AUTO: 0.21 10E6/UL (ref 0.03–0.1)
RETICS/RBC NFR AUTO: 5.8 % (ref 0.5–2)
WBC # BLD AUTO: 8.1 10E3/UL (ref 4–11)

## 2023-03-03 PROCEDURE — 85045 AUTOMATED RETICULOCYTE COUNT: CPT

## 2023-03-03 PROCEDURE — 36415 COLL VENOUS BLD VENIPUNCTURE: CPT

## 2023-03-03 PROCEDURE — 85025 COMPLETE CBC W/AUTO DIFF WBC: CPT

## 2023-03-03 PROCEDURE — 86923 COMPATIBILITY TEST ELECTRIC: CPT

## 2023-03-03 PROCEDURE — 86850 RBC ANTIBODY SCREEN: CPT

## 2023-03-03 PROCEDURE — 83021 HEMOGLOBIN CHROMOTOGRAPHY: CPT

## 2023-03-03 NOTE — NURSING NOTE
Chief Complaint   Patient presents with     Labs Only     Labs collected from venipuncture by RN. No vitals taken.     Barbi Sahu RN

## 2023-03-05 LAB
BLD PROD TYP BPU: NORMAL
BLOOD COMPONENT TYPE: NORMAL
CODING SYSTEM: NORMAL
CROSSMATCH: NORMAL
HGB S BLD QL: NORMAL
ISSUE DATE AND TIME: NORMAL
UNIT ABO/RH: NORMAL
UNIT NUMBER: NORMAL
UNIT STATUS: NORMAL
UNIT TYPE ISBT: 9500

## 2023-03-06 ENCOUNTER — HOSPITAL ENCOUNTER (OUTPATIENT)
Dept: LAB | Facility: CLINIC | Age: 28
Discharge: HOME | End: 2023-03-06
Attending: PEDIATRICS | Admitting: PATHOLOGY
Payer: COMMERCIAL

## 2023-03-06 VITALS
SYSTOLIC BLOOD PRESSURE: 120 MMHG | HEART RATE: 82 BPM | DIASTOLIC BLOOD PRESSURE: 84 MMHG | TEMPERATURE: 97.8 F | RESPIRATION RATE: 18 BRPM | HEIGHT: 72 IN | WEIGHT: 216.71 LBS | BODY MASS INDEX: 29.35 KG/M2

## 2023-03-06 LAB
HCT VFR BLD AUTO: 30.6 % (ref 40–53)
HCT VFR BLD AUTO: 30.6 % (ref 40–53)
HGB BLD-MCNC: 10.4 G/DL (ref 13.3–17.7)
HGB BLD-MCNC: 10.4 G/DL (ref 13.3–17.7)

## 2023-03-06 PROCEDURE — 36415 COLL VENOUS BLD VENIPUNCTURE: CPT

## 2023-03-06 PROCEDURE — 250N000013 HC RX MED GY IP 250 OP 250 PS 637: Performed by: PATHOLOGY

## 2023-03-06 PROCEDURE — 85014 HEMATOCRIT: CPT

## 2023-03-06 PROCEDURE — 999N000127 HC STATISTIC PERIPHERAL IV START W US GUIDANCE

## 2023-03-06 PROCEDURE — 83021 HEMOGLOBIN CHROMOTOGRAPHY: CPT

## 2023-03-06 PROCEDURE — P9016 RBC LEUKOCYTES REDUCED: HCPCS

## 2023-03-06 PROCEDURE — 36512 APHERESIS RBC: CPT

## 2023-03-06 PROCEDURE — 250N000009 HC RX 250

## 2023-03-06 RX ORDER — HYDROXYZINE HYDROCHLORIDE 25 MG/1
25 TABLET, FILM COATED ORAL
Status: CANCELLED | OUTPATIENT
Start: 2023-03-06

## 2023-03-06 RX ORDER — HYDROXYZINE HYDROCHLORIDE 25 MG/1
25 TABLET, FILM COATED ORAL ONCE
Status: COMPLETED | OUTPATIENT
Start: 2023-03-06 | End: 2023-03-06

## 2023-03-06 RX ORDER — HYDROXYZINE HYDROCHLORIDE 50 MG/1
50 TABLET, FILM COATED ORAL
Status: CANCELLED | OUTPATIENT
Start: 2023-03-06

## 2023-03-06 RX ORDER — HYDROXYZINE HYDROCHLORIDE 25 MG/ML
25 INJECTION, SOLUTION INTRAMUSCULAR ONCE
Status: CANCELLED | OUTPATIENT
Start: 2023-03-06

## 2023-03-06 RX ADMIN — ANTICOAGULANT CITRATE DEXTROSE SOLUTION FORMULA A 517 ML: 12.25; 11; 3.65 SOLUTION INTRAVENOUS at 09:45

## 2023-03-06 RX ADMIN — HYDROXYZINE HYDROCHLORIDE 25 MG: 25 TABLET, FILM COATED ORAL at 08:55

## 2023-03-06 NOTE — PROCEDURES
Transfusion Medicine  Apheresis Procedure Note     Ricky Pak [id number]   YOB: 1995 Age: 27 year old         Procedure:  Red blood cell exchange (RBC exchange)           Assessment and Plan:   Ricky Pak is a 27 year old male with SCD who is undergoing maintenance RBC exchange this AM. Peripheral veins are used for access.  He was pre-medicated with Atarax 25 mg po.  He is tolerating the procedure well.  No concerns/complaints voiced.  No changes in health since last RBC exchange in Feb 2023.  His SCD remains under good control with regular RBC exchange.  No pain crises or other effects of SCD recently.  Continue with plan for roughly monthly RBC exchanges.           History of Present Illness:   Ricky Pak is a 27 year old male  who presents for RBC exchange. 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     He began apheresis-based RBC exchanges around 2013 following his issues with iron overload.  Per previous documentation, these exchanges have done an excellent job preventing sickle cell crises. He had a hypotensive episode following a previous apheresis procedure here which was a depletion followed by an exchange. Therefore, we plan to proceed with the exchange alone approach for all RBC exchanges for him.          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            Past Surgical History:     Past Surgical History:   Procedure Laterality Date     CHOLECYSTECTOMY N/A 2011     LIVER BIOPSY N/A 2007    iron overload monitoring      LIVER BIOPSY N/A 2008    monitoring for iron overload            Allergies:     Allergies   Allergen Reactions     Diphenhydramine Hives          Medications:      Current Outpatient Medications   Medication     acetaminophen-codeine (TYLENOL W/CODEINE #3) 300-30 MG per tablet     HYDROcodone-acetaminophen (NORCO) 5-325 MG tablet     No current facility-administered medications for this encounter.                 Review of Systems:    Feels well.  No recent pain crises.  No signs/symptoms of viral infection.           Exam:     Vitals:    03/06/23 0945 03/06/23 1008 03/06/23 1017 03/06/23 1038   BP:  (!) 141/71 134/72 (!) 142/69   Pulse:  65 65 61   Resp:  18 18 18   Temp: 98.8  F (37.1  C) 98.9  F (37.2  C) 98.9  F (37.2  C) 98.1  F (36.7  C)   TempSrc:  Oral Oral Oral     Alert, no apparent distress  Breathing appears comfortable on room air  Peripheral IV access for the procedure.            Data:              CBCRecent Labs   Lab 03/06/23  1006 03/03/23  1153   WBC  --  8.1   RBC  --  3.65*   HGB 10.4* 10.3*   HCT 30.6* 30.2*   MCV  --  83   MCH  --  28.2   MCHC  --  34.1   RDW  --  19.3*   PLT  --  401             Procedure Summary:   A red blood cell exchange is being performed using donor red blood cells as the replacement product.    Peripheral veins are used for access, and they are allowing appropriate flow during the procedure.  ACD-A is used for anticoagulation.  The patient's vital signs have been stable throughout the exchange.  The patient is tolerating the procedure well without complication.  See apheresis flowsheet for additional details.    Attestation: During the procedure the patient was directly seen and evaluated by me, Spencer Mejia M.D.    Spencer Mejia M.D.  Professor, Transfusion Medicine  Laboratory Medicine & Pathology  Pager: [phone]

## 2023-03-07 LAB
HGB S BLD QL: NORMAL
HGB S BLD QL: NORMAL

## 2023-03-30 LAB
ABO/RH(D): NORMAL
ANTIBODY SCREEN: NEGATIVE
SPECIMEN EXPIRATION DATE: NORMAL

## 2023-03-31 ENCOUNTER — LAB (OUTPATIENT)
Dept: LAB | Facility: CLINIC | Age: 28
End: 2023-03-31
Attending: PEDIATRICS
Payer: COMMERCIAL

## 2023-03-31 DIAGNOSIS — D57.1 HB-SS DISEASE WITHOUT CRISIS (H): ICD-10-CM

## 2023-03-31 LAB
BASOPHILS # BLD AUTO: 0.1 10E3/UL (ref 0–0.2)
BASOPHILS NFR BLD AUTO: 2 %
EOSINOPHIL # BLD AUTO: 1.4 10E3/UL (ref 0–0.7)
EOSINOPHIL NFR BLD AUTO: 17 %
ERYTHROCYTE [DISTWIDTH] IN BLOOD BY AUTOMATED COUNT: 19.9 % (ref 10–15)
HCT VFR BLD AUTO: 30.2 % (ref 40–53)
HGB BLD-MCNC: 10.1 G/DL (ref 13.3–17.7)
IMM GRANULOCYTES # BLD: 0 10E3/UL
IMM GRANULOCYTES NFR BLD: 0 %
LYMPHOCYTES # BLD AUTO: 2.5 10E3/UL (ref 0.8–5.3)
LYMPHOCYTES NFR BLD AUTO: 31 %
MCH RBC QN AUTO: 27.9 PG (ref 26.5–33)
MCHC RBC AUTO-ENTMCNC: 33.4 G/DL (ref 31.5–36.5)
MCV RBC AUTO: 83 FL (ref 78–100)
MONOCYTES # BLD AUTO: 0.8 10E3/UL (ref 0–1.3)
MONOCYTES NFR BLD AUTO: 10 %
NEUTROPHILS # BLD AUTO: 3.2 10E3/UL (ref 1.6–8.3)
NEUTROPHILS NFR BLD AUTO: 40 %
NRBC # BLD AUTO: 0 10E3/UL
NRBC BLD AUTO-RTO: 1 /100
PLATELET # BLD AUTO: 530 10E3/UL (ref 150–450)
RBC # BLD AUTO: 3.62 10E6/UL (ref 4.4–5.9)
RETICS # AUTO: 0.16 10E6/UL (ref 0.03–0.1)
RETICS/RBC NFR AUTO: 4.5 % (ref 0.5–2)
WBC # BLD AUTO: 8 10E3/UL (ref 4–11)

## 2023-03-31 PROCEDURE — 86850 RBC ANTIBODY SCREEN: CPT

## 2023-03-31 PROCEDURE — 86923 COMPATIBILITY TEST ELECTRIC: CPT

## 2023-03-31 PROCEDURE — 36415 COLL VENOUS BLD VENIPUNCTURE: CPT

## 2023-03-31 PROCEDURE — 85025 COMPLETE CBC W/AUTO DIFF WBC: CPT

## 2023-03-31 PROCEDURE — 83021 HEMOGLOBIN CHROMOTOGRAPHY: CPT

## 2023-03-31 PROCEDURE — 85045 AUTOMATED RETICULOCYTE COUNT: CPT

## 2023-03-31 RX ORDER — HYDROXYZINE HYDROCHLORIDE 25 MG/1
25 TABLET, FILM COATED ORAL ONCE
Status: CANCELLED | OUTPATIENT
Start: 2023-03-31

## 2023-03-31 RX ORDER — HYDROXYZINE HYDROCHLORIDE 50 MG/1
50 TABLET, FILM COATED ORAL ONCE
Status: CANCELLED | OUTPATIENT
Start: 2023-03-31

## 2023-03-31 NOTE — NURSING NOTE
Chief Complaint   Patient presents with     Blood Draw     Vpt blood draw from right arm by lab RN     Iris Stapleton, RN

## 2023-04-03 ENCOUNTER — HOSPITAL ENCOUNTER (OUTPATIENT)
Dept: LAB | Facility: CLINIC | Age: 28
Discharge: HOME | End: 2023-04-03
Attending: PEDIATRICS | Admitting: PEDIATRICS
Payer: COMMERCIAL

## 2023-04-03 VITALS
DIASTOLIC BLOOD PRESSURE: 76 MMHG | RESPIRATION RATE: 16 BRPM | TEMPERATURE: 98 F | WEIGHT: 219.14 LBS | HEART RATE: 78 BPM | SYSTOLIC BLOOD PRESSURE: 140 MMHG | BODY MASS INDEX: 29.52 KG/M2

## 2023-04-03 LAB
HCT VFR BLD AUTO: 30 % (ref 40–53)
HCT VFR BLD AUTO: 30.5 % (ref 40–53)
HGB BLD-MCNC: 10.1 G/DL (ref 13.3–17.7)
HGB BLD-MCNC: 10.2 G/DL (ref 13.3–17.7)

## 2023-04-03 PROCEDURE — 85018 HEMOGLOBIN: CPT

## 2023-04-03 PROCEDURE — P9016 RBC LEUKOCYTES REDUCED: HCPCS

## 2023-04-03 PROCEDURE — 250N000013 HC RX MED GY IP 250 OP 250 PS 637

## 2023-04-03 PROCEDURE — 36512 APHERESIS RBC: CPT

## 2023-04-03 PROCEDURE — 36415 COLL VENOUS BLD VENIPUNCTURE: CPT

## 2023-04-03 PROCEDURE — 86923 COMPATIBILITY TEST ELECTRIC: CPT

## 2023-04-03 PROCEDURE — 83021 HEMOGLOBIN CHROMOTOGRAPHY: CPT

## 2023-04-03 PROCEDURE — 999N000248 HC STATISTIC IV INSERT WITH US BY RN

## 2023-04-03 PROCEDURE — 250N000009 HC RX 250

## 2023-04-03 RX ORDER — HYDROXYZINE HYDROCHLORIDE 25 MG/1
25 TABLET, FILM COATED ORAL ONCE
Status: COMPLETED | OUTPATIENT
Start: 2023-04-03 | End: 2023-04-03

## 2023-04-03 RX ORDER — HYDROXYZINE HYDROCHLORIDE 50 MG/1
50 TABLET, FILM COATED ORAL ONCE
Status: COMPLETED | OUTPATIENT
Start: 2023-04-03 | End: 2023-04-03

## 2023-04-03 RX ADMIN — HYDROXYZINE HYDROCHLORIDE 25 MG: 50 TABLET, FILM COATED ORAL at 12:57

## 2023-04-03 RX ADMIN — ANTICOAGULANT CITRATE DEXTROSE SOLUTION FORMULA A 496 ML: 12.25; 11; 3.65 SOLUTION INTRAVENOUS at 13:30

## 2023-04-03 NOTE — DISCHARGE INSTRUCTIONS
Red blood cell exchange:   If you received blood products (plasma or red blood cells) as part of your treatment, you need to be aware that transfusion reactions can occur up to several hours after they have been given to you.  Call your physician if you experience any symptoms in the next 48 hours, including breathing problems, rash, itching, hives, nausea or vomiting, fever or chills, blood in your urine or stools, or joint pain.  Please inform the Transfusion Medicine Physician by calling [phone] and asking for the physician on call.Apheresis Blood Donor Center Post Instructions  You may feel tired after your procedure today.   Please call your doctor if you have:  bleeding that doesn t stop, fever, pain where a needle or tube (catheter) was placed, seizures, trouble breathing, red urine, nausea or vomiting, other health concerns.     If your symptoms are severe, call [phone].  If your veins were used, keep the bandages on for 2-4 hours.  Avoid heavy lifting with your arms.  If bleeding occurs from these sites, apply firm pressure for 5-10 minutes.  Call your physician if bleeding continues.    The Apheresis/Blood Donor Center is open Monday-Friday 7:30 a.m. to 5 p.m.  The phone number is [phone].  A Transfusion Medicine physician can be reached after 5:00 p.m. weekdays and on weekends /Holidays by calling [phone], and asking for the physician on call.

## 2023-04-04 NOTE — PROCEDURES
Transfusion Medicine  Apheresis Procedure Note     Ricky Pak [id number]   YOB: 1995 Age: 27 year old         Procedure:  Red blood cell exchange (RBC exchange)           Assessment and Plan:   Ricky Pak is a 27 year old male with SCD who underwent maintenance RBC exchange. Peripheral veins are used for access.  He was pre-medicated with Atarax 25 mg po.  He tolerated the procedure well.  No concerns/complaints voiced.  No changes in health since last RBC exchange.  His SCD remains under good control with regular RBC exchange.  No pain crises or other effects of SCD recently.  Continue with plan for roughly monthly RBC exchanges.           History of Present Illness:   Ricky Pak is a 27 year old male  who presents for RBC exchange. 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     He began apheresis-based RBC exchanges around 2013 following his issues with iron overload.  Per previous documentation, these exchanges have done an excellent job preventing sickle cell crises. He had a hypotensive episode following a previous apheresis procedure here which was a depletion followed by an exchange. Therefore, we plan to proceed with the exchange alone approach for all RBC exchanges for him.          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            Past Surgical History:     Past Surgical History:   Procedure Laterality Date     CHOLECYSTECTOMY N/A 2011     LIVER BIOPSY N/A 2007    iron overload monitoring     LIVER BIOPSY N/A 2008     monitoring for iron overload            Allergies:     Allergies   Allergen Reactions     Diphenhydramine Hives          Medications:      Current Outpatient Medications   Medication     acetaminophen-codeine (TYLENOL W/CODEINE #3) 300-30 MG per tablet     HYDROcodone-acetaminophen (NORCO) 5-325 MG tablet     No current facility-administered medications for this encounter.                 Review of Systems:    Feels well.  No recent pain crises.  No signs/symptoms of viral infection.           Exam:     Vitals:    04/03/23 1400 04/03/23 1432 04/03/23 1501 04/03/23 1526   BP: (!) 142/80 (!) 151/69 (!) 141/68 (!) 140/76   Pulse: 78 89 75 78   Resp: 16 16 16 16   Temp: 98  F (36.7  C) 98  F (36.7  C) 98.1  F (36.7  C) 98  F (36.7  C)   TempSrc: Oral Oral Oral Oral   Weight:         Alert, no apparent distress  Breathing appears comfortable on room air  Peripheral IV access for the procedure.            Data:              CBC  Recent Labs   Lab 04/03/23  1526 04/03/23  1322 03/31/23  1159   WBC  --   --  8.0   RBC  --   --  3.62*   HGB 10.2* 10.1* 10.1*   HCT 30.0* 30.5* 30.2*   MCV  --   --  83   MCH  --   --  27.9   MCHC  --   --  33.4   RDW  --   --  19.9*   PLT  --   --  530*           Procedure Summary:   A red blood cell exchange was performed using donor red blood cells as the replacement product.    Peripheral veins were used for access and allowed for appropriate flow during the procedure.  ACD-A was used for anticoagulation.   The patient's vital signs were stable throughout.  The patient tolerated the procedure well without complication.  See apheresis flowsheet for additional details.      Attestation: During the procedure the patient was directly seen and evaluated by me, Maikel Del Toro MD.    Maikel Del Toro MD  Transfusion Medicine Attending  Laboratory Medicine & Pathology

## 2023-04-05 LAB
HGB S BLD QL: NORMAL
HGB S BLD QL: NORMAL

## 2023-05-08 ENCOUNTER — HEALTH MAINTENANCE LETTER (OUTPATIENT)
Age: 28
End: 2023-05-08

## 2023-06-01 LAB
ABO/RH(D): NORMAL
ANTIBODY SCREEN: NEGATIVE
SPECIMEN EXPIRATION DATE: NORMAL

## 2023-06-02 ENCOUNTER — LAB (OUTPATIENT)
Dept: LAB | Facility: CLINIC | Age: 28
End: 2023-06-02
Attending: PEDIATRICS
Payer: COMMERCIAL

## 2023-06-02 DIAGNOSIS — D57.1 HB-SS DISEASE WITHOUT CRISIS (H): ICD-10-CM

## 2023-06-02 LAB
BASOPHILS # BLD AUTO: 0.1 10E3/UL (ref 0–0.2)
BASOPHILS NFR BLD AUTO: 1 %
EOSINOPHIL # BLD AUTO: 0.6 10E3/UL (ref 0–0.7)
EOSINOPHIL NFR BLD AUTO: 7 %
ERYTHROCYTE [DISTWIDTH] IN BLOOD BY AUTOMATED COUNT: 22.1 % (ref 10–15)
HCT VFR BLD AUTO: 28.6 % (ref 40–53)
HGB BLD-MCNC: 9.8 G/DL (ref 13.3–17.7)
IMM GRANULOCYTES # BLD: 0 10E3/UL
IMM GRANULOCYTES NFR BLD: 0 %
LYMPHOCYTES # BLD AUTO: 3.1 10E3/UL (ref 0.8–5.3)
LYMPHOCYTES NFR BLD AUTO: 34 %
MCH RBC QN AUTO: 27.5 PG (ref 26.5–33)
MCHC RBC AUTO-ENTMCNC: 34.3 G/DL (ref 31.5–36.5)
MCV RBC AUTO: 80 FL (ref 78–100)
MONOCYTES # BLD AUTO: 0.9 10E3/UL (ref 0–1.3)
MONOCYTES NFR BLD AUTO: 10 %
NEUTROPHILS # BLD AUTO: 4.4 10E3/UL (ref 1.6–8.3)
NEUTROPHILS NFR BLD AUTO: 48 %
NRBC # BLD AUTO: 0.1 10E3/UL
NRBC BLD AUTO-RTO: 1 /100
PLATELET # BLD AUTO: 428 10E3/UL (ref 150–450)
RBC # BLD AUTO: 3.57 10E6/UL (ref 4.4–5.9)
RETICS # AUTO: 0.28 10E6/UL (ref 0.03–0.1)
RETICS/RBC NFR AUTO: 7.9 % (ref 0.5–2)
WBC # BLD AUTO: 9.2 10E3/UL (ref 4–11)

## 2023-06-02 PROCEDURE — 85025 COMPLETE CBC W/AUTO DIFF WBC: CPT

## 2023-06-02 PROCEDURE — 85045 AUTOMATED RETICULOCYTE COUNT: CPT

## 2023-06-02 PROCEDURE — 86901 BLOOD TYPING SEROLOGIC RH(D): CPT

## 2023-06-02 PROCEDURE — 85660 RBC SICKLE CELL TEST: CPT

## 2023-06-02 PROCEDURE — 83021 HEMOGLOBIN CHROMOTOGRAPHY: CPT

## 2023-06-02 PROCEDURE — 36415 COLL VENOUS BLD VENIPUNCTURE: CPT

## 2023-06-02 PROCEDURE — 86923 COMPATIBILITY TEST ELECTRIC: CPT

## 2023-06-02 RX ORDER — HYDROXYZINE HYDROCHLORIDE 25 MG/1
25 TABLET, FILM COATED ORAL ONCE
Status: CANCELLED | OUTPATIENT
Start: 2023-06-02

## 2023-06-02 NOTE — NURSING NOTE
Chief Complaint   Patient presents with     Blood Draw     Vpt blood draw by lab RN     Iris Stapleton, RN

## 2023-06-04 LAB
BLD PROD TYP BPU: NORMAL
BLOOD COMPONENT TYPE: NORMAL
CODING SYSTEM: NORMAL
CROSSMATCH: NORMAL
HGB S BLD QL: POSITIVE
ISSUE DATE AND TIME: NORMAL
SICKLE SOLUBILITY REFLEX BILL: NORMAL
UNIT ABO/RH: NORMAL
UNIT NUMBER: NORMAL
UNIT STATUS: NORMAL
UNIT TYPE ISBT: 5100
UNIT TYPE ISBT: 9500

## 2023-06-05 ENCOUNTER — HOSPITAL ENCOUNTER (OUTPATIENT)
Dept: LAB | Facility: CLINIC | Age: 28
Discharge: HOME | End: 2023-06-05
Attending: PEDIATRICS | Admitting: PEDIATRICS
Payer: COMMERCIAL

## 2023-06-05 VITALS
DIASTOLIC BLOOD PRESSURE: 72 MMHG | HEIGHT: 72 IN | WEIGHT: 225.31 LBS | BODY MASS INDEX: 30.52 KG/M2 | RESPIRATION RATE: 16 BRPM | HEART RATE: 74 BPM | SYSTOLIC BLOOD PRESSURE: 148 MMHG | TEMPERATURE: 98.4 F

## 2023-06-05 DIAGNOSIS — D57.1 HB-SS DISEASE WITHOUT CRISIS (H): Primary | ICD-10-CM

## 2023-06-05 LAB
HCT VFR BLD AUTO: 30.2 % (ref 40–53)
HCT VFR BLD AUTO: 32 % (ref 40–53)
HGB BLD-MCNC: 10.1 G/DL (ref 13.3–17.7)
HGB BLD-MCNC: 10.6 G/DL (ref 13.3–17.7)

## 2023-06-05 PROCEDURE — 85018 HEMOGLOBIN: CPT | Performed by: PATHOLOGY

## 2023-06-05 PROCEDURE — 999N000127 HC STATISTIC PERIPHERAL IV START W US GUIDANCE

## 2023-06-05 PROCEDURE — 250N000013 HC RX MED GY IP 250 OP 250 PS 637

## 2023-06-05 PROCEDURE — 83021 HEMOGLOBIN CHROMOTOGRAPHY: CPT | Performed by: PATHOLOGY

## 2023-06-05 PROCEDURE — 250N000009 HC RX 250

## 2023-06-05 PROCEDURE — 83021 HEMOGLOBIN CHROMOTOGRAPHY: CPT

## 2023-06-05 PROCEDURE — 85014 HEMATOCRIT: CPT | Performed by: PATHOLOGY

## 2023-06-05 PROCEDURE — 85014 HEMATOCRIT: CPT

## 2023-06-05 PROCEDURE — 36512 APHERESIS RBC: CPT

## 2023-06-05 PROCEDURE — P9016 RBC LEUKOCYTES REDUCED: HCPCS

## 2023-06-05 PROCEDURE — 36415 COLL VENOUS BLD VENIPUNCTURE: CPT

## 2023-06-05 PROCEDURE — 36415 COLL VENOUS BLD VENIPUNCTURE: CPT | Performed by: PATHOLOGY

## 2023-06-05 RX ORDER — HYDROXYZINE HYDROCHLORIDE 25 MG/1
25 TABLET, FILM COATED ORAL ONCE
Status: COMPLETED | OUTPATIENT
Start: 2023-06-05 | End: 2023-06-05

## 2023-06-05 RX ADMIN — HYDROXYZINE HYDROCHLORIDE 25 MG: 25 TABLET, FILM COATED ORAL at 09:07

## 2023-06-05 RX ADMIN — ANTICOAGULANT CITRATE DEXTROSE SOLUTION FORMULA A 533 ML: 12.25; 11; 3.65 SOLUTION INTRAVENOUS at 09:15

## 2023-06-05 NOTE — PROCEDURES
Transfusion Medicine Procedure    Ricky Pak MRN# [id number]   YOB: 1995 Age: 27 year old        Procedure: RBC exchange           Assessment and Plan:   Ricky Pak is a 27 year old male with SCD who underwent maintenance RBC exchange. Peripheral veins were used for access.  He was pre-medicated with Atarax 25 mg po.  He tolerated the procedure well.  No concerns/complaints voiced.  No changes in health since last RBC exchange.  His SCD remains under good control with regular RBC exchange.  No pain crises or other effects of SCD recently.  Continue with plan for roughly monthly RBC exchanges.             History of Present Illness:   Ricky Pak is a 27 year old male  who presents for RBC exchange. 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     He began apheresis-based RBC exchanges around 2013 following his issues with iron overload.  Per previous documentation, these exchanges have done an excellent job preventing sickle cell crises. He had a hypotensive episode following a previous apheresis procedure here which was a depletion followed by an exchange. Therefore, we plan to proceed with the exchange alone approach for all RBC exchanges for him.             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             Past Surgical History:     Past Surgical History:   Procedure Laterality Date     CHOLECYSTECTOMY N/A 2011     LIVER BIOPSY N/A 2007    iron overload monitoring     LIVER BIOPSY N/A 2008    monitoring for iron overload           "    Social History:     Social History     Tobacco Use     Smoking status: Never     Smokeless tobacco: Never   Vaping Use     Vaping status: Not on file   Substance Use Topics     Alcohol use: Yes     Alcohol/week: 2.0 standard drinks of alcohol     Types: 2 Standard drinks or equivalent per week     Comment: \"A few beers every other weekend.\"             Family History:     Family History   Problem Relation Age of Onset     Sickle Cell Trait Mother      Sickle Cell Trait Father      No Known Problems Sister      No Known Problems Sister      Sickle Cell Trait Brother      Breast Cancer Maternal Grandmother      Breast Cancer Maternal Great-Grandmother              Immunizations:     Immunization History   Administered Date(s) Administered     COVID-19 MONOVALENT 12+ ([employer]) 04/02/2021, 04/23/2021             Allergies:     Allergies   Allergen Reactions     Diphenhydramine Hives             Medications:     Current Outpatient Medications   Medication Sig     acetaminophen-codeine (TYLENOL W/CODEINE #3) 300-30 MG per tablet Take 1-2 tablets by mouth as needed     HYDROcodone-acetaminophen (NORCO) 5-325 MG tablet Take 1-2 tablets by mouth as needed     No current facility-administered medications for this encounter.                   Vital Signs:   Vitals were reviewed.  Patient is hypertensive during the procedure.  All other VSS.            Data:      Blood type Rh(D)   O POS RH(D)   Date Value Ref Range Status   01/12/2021 Pos  Final             ROUTINE IP LABS (Last four results)  BMPNo lab results found in last 7 days.  CBCRecent Labs   Lab 06/05/23  0918 06/02/23  1130   WBC  --  9.2   RBC  --  3.57*   HGB 10.1* 9.8*   HCT 30.2* 28.6*   MCV  --  80   MCH  --  27.5   MCHC  --  34.3   RDW  --  22.1*   PLT  --  428     INRNo lab results found in last 7 days.        Procedure Summary:   A red blood cell exchange was performed using donor red blood cells as the replacement product.    Peripheral veins were used " for access and allowed for appropriate flow during the procedure.  ACD-A was used for anticoagulation.   The patient's vital signs were stable throughout.  The patient tolerated the procedure well without complication.  See apheresis flowsheet for additional details.    ATTESTATION STATEMENT:   During the procedure this patient was directly seen and evaluated by me , Sofi Ochoa MD, PhD.      Sofi Ochoa MD, PhD  Transfusion Medicine Attending  Medical Director, Blood Bank Laboratory  Pager [phone]

## 2023-06-05 NOTE — DISCHARGE INSTRUCTIONS
Red blood cell exchange:   If you received blood products (plasma or red blood cells) as part of your treatment, you need to be aware that transfusion reactions can occur up to several hours after they have been given to you.  Call your physician if you experience any symptoms in the next 48 hours, including breathing problems, rash, itching, hives, nausea or vomiting, fever or chills, blood in your urine or stools, or joint pain.  Please inform the Transfusion Medicine Physician by calling [phone] and asking for the physician on call.      After Your Blood Transfusion  Discharge Instructions  After you leave  After you have a blood transfusion,* watch for signs of a transfusion reaction for the next 48 hours.   Signs to watch for:  Shaking or chills  Fever above 100.4 F  Headache  Nausea (feeling sich to your stomach)  Hives  Itching  Swelling of the face or feeling flushed  Ongoing dry cough (nothing is coughed up)  Trouble breathing, or wheezing  Call your clinic or 911, or go to the Emergency Room, if you have any signs of a transfusion reaction.  After the first 48 hours  Some signs of a reaction won't show up for a few days or up to 4 weeks. Call your clinic if you have symptoms of a transfusion reaction.  These may include:  Fatigue (feeling very tired)  Dizziness  Pink or red urine  *A blood transfusion is when you receive red blood cells, platelets, plasma or cryoprecipitate.   For informational purposes only. Not to replace the advice of your health care provider. Copyright   2015 [employer]. All rights reserved. [employer] 469264 - Rev 02/22.

## 2023-06-07 LAB
HGB S BLD QL: NORMAL
HGB S BLD QL: NORMAL

## 2023-07-13 LAB
ABO/RH(D): NORMAL
ANTIBODY SCREEN: NEGATIVE
SPECIMEN EXPIRATION DATE: NORMAL

## 2023-07-14 ENCOUNTER — APPOINTMENT (OUTPATIENT)
Dept: LAB | Facility: CLINIC | Age: 28
End: 2023-07-14
Attending: REGISTERED NURSE
Payer: COMMERCIAL

## 2023-07-14 ENCOUNTER — ONCOLOGY VISIT (OUTPATIENT)
Dept: ONCOLOGY | Facility: CLINIC | Age: 28
End: 2023-07-14
Attending: REGISTERED NURSE
Payer: COMMERCIAL

## 2023-07-14 VITALS
SYSTOLIC BLOOD PRESSURE: 129 MMHG | HEART RATE: 74 BPM | RESPIRATION RATE: 18 BRPM | BODY MASS INDEX: 30.75 KG/M2 | TEMPERATURE: 98.7 F | DIASTOLIC BLOOD PRESSURE: 79 MMHG | WEIGHT: 228.3 LBS | OXYGEN SATURATION: 98 %

## 2023-07-14 DIAGNOSIS — D57.1 SICKLE CELL DISEASE WITHOUT CRISIS (H): Primary | ICD-10-CM

## 2023-07-14 LAB
BASOPHILS # BLD AUTO: 0.2 10E3/UL (ref 0–0.2)
BASOPHILS NFR BLD AUTO: 1 %
EOSINOPHIL # BLD AUTO: 0.8 10E3/UL (ref 0–0.7)
EOSINOPHIL NFR BLD AUTO: 6 %
ERYTHROCYTE [DISTWIDTH] IN BLOOD BY AUTOMATED COUNT: 23.2 % (ref 10–15)
HCT VFR BLD AUTO: 28.7 % (ref 40–53)
HGB BLD-MCNC: 9.6 G/DL (ref 13.3–17.7)
IMM GRANULOCYTES # BLD: 0.1 10E3/UL
IMM GRANULOCYTES NFR BLD: 1 %
LYMPHOCYTES # BLD AUTO: 2.6 10E3/UL (ref 0.8–5.3)
LYMPHOCYTES NFR BLD AUTO: 20 %
MCH RBC QN AUTO: 25.2 PG (ref 26.5–33)
MCHC RBC AUTO-ENTMCNC: 33.4 G/DL (ref 31.5–36.5)
MCV RBC AUTO: 75 FL (ref 78–100)
MONOCYTES # BLD AUTO: 1.3 10E3/UL (ref 0–1.3)
MONOCYTES NFR BLD AUTO: 10 %
NEUTROPHILS # BLD AUTO: 8.2 10E3/UL (ref 1.6–8.3)
NEUTROPHILS NFR BLD AUTO: 62 %
NRBC # BLD AUTO: 0.1 10E3/UL
NRBC BLD AUTO-RTO: 1 /100
PLATELET # BLD AUTO: 445 10E3/UL (ref 150–450)
RBC # BLD AUTO: 3.81 10E6/UL (ref 4.4–5.9)
WBC # BLD AUTO: 13.1 10E3/UL (ref 4–11)

## 2023-07-14 PROCEDURE — 86850 RBC ANTIBODY SCREEN: CPT | Performed by: PEDIATRICS

## 2023-07-14 PROCEDURE — 86901 BLOOD TYPING SEROLOGIC RH(D): CPT | Performed by: PEDIATRICS

## 2023-07-14 PROCEDURE — 86923 COMPATIBILITY TEST ELECTRIC: CPT

## 2023-07-14 PROCEDURE — 99214 OFFICE O/P EST MOD 30 MIN: CPT | Performed by: REGISTERED NURSE

## 2023-07-14 PROCEDURE — G0463 HOSPITAL OUTPT CLINIC VISIT: HCPCS | Performed by: REGISTERED NURSE

## 2023-07-14 PROCEDURE — 36415 COLL VENOUS BLD VENIPUNCTURE: CPT

## 2023-07-14 PROCEDURE — 85025 COMPLETE CBC W/AUTO DIFF WBC: CPT

## 2023-07-14 RX ORDER — HYDROXYZINE HYDROCHLORIDE 25 MG/1
25 TABLET, FILM COATED ORAL ONCE
Status: CANCELLED | OUTPATIENT
Start: 2023-07-14

## 2023-07-14 ASSESSMENT — PAIN SCALES - GENERAL: PAINLEVEL: NO PAIN (0)

## 2023-07-14 NOTE — PROGRESS NOTES
Adult Sickle Cell Outpatient Clinic Note        Date of Visit: 7/14/23    Ricky Pak is a 27 year old male who is being seen as a follow up for SCD care. He has chosen to continue care in our system although his work schedule requires he travel every few months.  He is seen today for routine clinic follow-up.    Interval History  Ricky has been doing well.  He is currently in [facility] for work.  He has elected to return to [place] monthly for his exchange transfusions and follow-up as he prefers the care in our system.  He denies any new concerns.  Frequent pain is not typically an issue for him.  He does note that he experiences intermittent tingling to his hands and believes this may be due to using his hands frequently at work and manipulating instruments.  When he first arrived in [place] 2 months ago he did develop a cold which he states is common once he is arrived to the new place.  His exchange transfusions continue to go well.  They have been using peripheral IV access.  He would like to avoid a port for as long as possible.    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 He was on monthly transfusions from 9829-6425 with chelation and in 2013 he was switched to peripheral RBC exchange which has kept him in great health since then. He no longer needs chelation and is able to keep his HbS ~30% or below with regularity. He grew up in [place] and has spent most of his life in the [facility]. 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 He just got sent to [facility] and moved here one week ago. He denies regular pain and has not had a pain crisis since he was a teenager. He has kept  up with his exchange transfusions and feels quite healthy overall. He regained the vision after having a macular infarct at age 10 and has no further neurologic symptoms since. He does not take any regular medications and has not had any opioids in many years either. He has no complaints today. He met with the apheresis team before this visit and is squared away to be maintained on his exchange schedule here.    ---------------------------------------  Ricky Pak's Goals (discussed 09/12/2022 )    1-3 month goal:      6 month goal:  Dental exam    12 month goal:    Disease-specific goal(s):  Maintain good exchange regimens despite moving about the country. Plans to continue to return to [place] monthly.  ---------------------------------------      Sickle Cell Disease Comprehensive Checklist    Bone Health/Avascular Necrosis Screening/Symptoms (each visit):  none    Leg Ulcer evaluation (every visit): , none    Hypertension (every visit): None    Last ophthalmologic exam: August 2022    Last urinalysis for microalbuminuria/CKD (annually): Needs at next visit    Last pulmonary evaluation (asthma, WILFREDO, pulm HTN): unknown    Stroke/silent cerebral infarct Hx (Y/N): macular infarct ~2006, vision now normalized    Last PCP Visit: No established PCP    Vaccines:   Reported up to date. Flu shot (2021-22) from work    Plan last reviewed with patient: 1/24/2023    Patient background: 26 yo male who recently moved from [facility] in late 2020. 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 No children or significant others    Sickle Cell Disease History  Primary Hematologist: Janiya  Genotype: SS  Acute Pain Crisis Treatment: (Opioid-naive)    ER/Acute Care/Infusion Clinic:   o Morphine 1 mg IVP/SC Q1H X 3 doses  o Toradol 30 mg IV x1  o LR 1 L  o Other: Zofran 8 mg IV    Inpatient:  o Opioid: Morphine 1 mg IV Q1H PRN until PCA starts  o Toradol 30 mg q6h x 3-4 days  o PCA  plan:  - PCA button dose: Morphine 1 mg  - Lockout: 20 minutes  - Continuous Infusion: consider 0.5 mg/hr  o Other Medications: Robaxin PRN, Zofran  o Supportive Care: Docusate, Senna  Chronic Pain Medications:    none  Baseline Hemoglobin: 12 mg/dl  Hydroxyurea use: no  H/O blood transfusions: Yes (partial exchange since 2013, full transfusion protocol 2006-13, now fully chelated)    H/O Transfusion Reactions: no    Antibodies: none known  H/O Acute Chest Syndrome: none    Last episode: n/a    ICU/intubation: no  H/O Stroke: Yes (macular infarct ~2006, vision normalized)  H/O VTE: no  H/O Cholecystectomy or Splenectomy: Cholecystectomy (2011)  H/O Asthma, Pulm HTN, AVN, Leg Ulcers, Nephropathy, Retinopathy, etc: none      Review Of Systems:   ROS: 10 point ROS neg other than the symptoms noted above in the HPI.    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       Past Surgical History:  Past Surgical History:   Procedure Laterality Date     CHOLECYSTECTOMY N/A 2011     LIVER BIOPSY N/A 2007    iron overload monitoring     LIVER BIOPSY N/A 2008    monitoring for iron overload       Past Family History:   Family History   Problem Relation Age of Onset     Sickle Cell Trait Mother      Sickle Cell Trait Father      No Known Problems Sister      No Known Problems Sister      Sickle Cell Trait Brother      Breast Cancer Maternal Grandmother      Breast Cancer Maternal Great-Grandmother        Social History:   Social History     Socioeconomic History     Marital status: Single     Spouse name: Not on file     Number of children: Not on file     Years of education: Not on file     Highest education level: Not  "on file   Occupational History     Not on file   Tobacco Use     Smoking status: Never     Smokeless tobacco: Never   Substance and Sexual Activity     Alcohol use: Yes     Alcohol/week: 2.0 standard drinks of alcohol     Types: 2 Standard drinks or equivalent per week     Comment: \"A few beers every other weekend.\"     Drug use: Never     Sexual activity: Not on file   Other Topics Concern     Not on file   Social History Narrative    Recently moved to [place]. 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     Social Determinants of Health     Financial Resource Strain: Not on file   Food Insecurity: Not on file   Transportation Needs: Not on file   Physical Activity: Not on file   Stress: Not on file   Social Connections: Not on file   Intimate Partner Violence: Not At Risk (9/12/2022)    Humiliation, Afraid, Rape, and Kick questionnaire      Fear of Current or Ex-Partner: No      Emotionally Abused: No      Physically Abused: No      Sexually Abused: No   Housing Stability: Not on file       Current Medications:    Current Outpatient Medications   Medication     acetaminophen-codeine (TYLENOL W/CODEINE #3) 300-30 MG per tablet     HYDROcodone-acetaminophen (NORCO) 5-325 MG tablet     No current facility-administered medications for this visit.       Physical Exam:  /79   Pulse 74   Temp 98.7  F (37.1  C) (Oral)   Resp 18   Wt 103.6 kg (228 lb 4.8 oz)   SpO2 98%   BMI 30.75 kg/m     General: Well-appearing male, NAD  Eyes: EOMI, PERRL. No scleral icterus.  ENT: Oral mucosa is moist without lesions or thrush.   Cardiovascular: RRR No murmurs, gallops, or rubs. No peripheral edema.  Respiratory: CTA bilaterally. No wheezes or crackles.  Neurologic: Grossly nonfocal.   Skin: No rashes, petechiae, or bruising noted on exposed skin.      Labs:   Latest Reference Range & Units 07/14/23 12:38   WBC 4.0 - 11.0 10e3/uL 13.1 (H)   Hemoglobin 13.3 - 17.7 g/dL 9.6 (L)   Hematocrit 40.0 - " 53.0 % 28.7 (L)   Platelet Count 150 - 450 10e3/uL 445   RBC Count 4.40 - 5.90 10e6/uL 3.81 (L)   MCV 78 - 100 fL 75 (L)   MCH 26.5 - 33.0 pg 25.2 (L)   MCHC 31.5 - 36.5 g/dL 33.4   RDW 10.0 - 15.0 % 23.2 (H)   % Neutrophils % 62   % Lymphocytes % 20   % Monocytes % 10   % Eosinophils % 6   % Basophils % 1   Absolute Basophils 0.0 - 0.2 10e3/uL 0.2   Absolute Eosinophils 0.0 - 0.7 10e3/uL 0.8 (H)   Absolute Immature Granulocytes <=0.4 10e3/uL 0.1   Absolute Lymphocytes 0.8 - 5.3 10e3/uL 2.6   Absolute Monocytes 0.0 - 1.3 10e3/uL 1.3   % Immature Granulocytes % 1   Absolute Neutrophils 1.6 - 8.3 10e3/uL 8.2   Absolute NRBCs 10e3/uL 0.1   NRBCs per 100 WBC <1 /100 1 (H)       Assessment:   Ricky Pak is a 27 year old male with HbSS Disease and a history of macular infarct who has elected to continue to receive sickle cell care in [place] routinely. He continues to travel for work every few months, currently residing in [place]. He remains on an exchange transfusion regimen monthly.  He did experience a hypotensive episode following a previous apheresis procedure consisting of depletion followed by exchange.  He now receives regular RBC exchange monthly.    He has no current concerns.  He is due for dental follow-up and admits that he has postponed this.  He will be due for ophthalmology follow-up next month.  We can delay this for a few months to allow for coordination with his pre-existing apheresis visits.    Plan:  1) Labs: CBC, retic, type and screen at apheresis visit. UA and CMP on return.  2) Orders: Adult eye referral  3) Follow up: 4-6 months with Dr. Denson    35 minutes spent on the date of the encounter doing chart review, review of test results, interpretation of tests, patient visit and documentation     Iris Roberts, CORBY

## 2023-07-14 NOTE — NURSING NOTE
Chief Complaint   Patient presents with     Blood Draw     Labs collected from venipuncture by RN. Vitals taken. Checked in for appointment(s).      Barbi Sahu RN

## 2023-07-14 NOTE — LETTER
7/14/2023         RE: Ricky Pak  [address] [id number]  [facility] [address]        Dear Colleague,    Thank you for referring your patient, Ricky Pak, to the [facility]. Please see a copy of my visit note below.    Adult Sickle Cell Outpatient Clinic Note        Date of Visit: 7/14/23    Ricky Pak is a 27 year old male who is being seen as a follow up for SCD care. He has chosen to continue care in our system although his work schedule requires he travel every few months.  He is seen today for routine clinic follow-up.    Interval History  Ricky has been doing well.  He is currently in [facility] for work.  He has elected to return to [place] monthly for his exchange transfusions and follow-up as he prefers the care in our system.  He denies any new concerns.  Frequent pain is not typically an issue for him.  He does note that he experiences intermittent tingling to his hands and believes this may be due to using his hands frequently at work and manipulating instruments.  When he first arrived in [place] 2 months ago he did develop a cold which he states is common once he is arrived to the new place.  His exchange transfusions continue to go well.  They have been using peripheral IV access.  He would like to avoid a port for as long as possible.    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 He was on monthly transfusions from 6159-8302 with chelation and in 2013 he was switched to peripheral RBC exchange which has kept him in great health since then. He no longer needs chelation and is able to keep his HbS ~30% or below with regularity. He grew up in [place] and has spent most of his life in the [facility]. 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 He just got sent to [facility] and moved here one week ago. He denies regular pain and has not had a pain crisis since he was a teenager. He has kept up with his exchange transfusions and feels quite healthy overall. He regained the vision after having a macular infarct at age 10 and has no further neurologic symptoms since. He does not take any regular medications and has not had any opioids in many years either. He has no complaints today. He met with the apheresis team before this visit and is squared away to be maintained on his exchange schedule here.    ---------------------------------------  Ricky Pak's Goals (discussed 09/12/2022 )    1-3 month goal:      6 month goal:  Dental exam    12 month goal:    Disease-specific goal(s):  Maintain good exchange regimens despite moving about the country. Plans to continue to return to [place] monthly.  ---------------------------------------      Sickle Cell Disease Comprehensive Checklist  Bone Health/Avascular Necrosis Screening/Symptoms (each visit):  none  Leg Ulcer evaluation (every visit): , none  Hypertension (every visit): None  Last ophthalmologic exam: August 2022  Last urinalysis for microalbuminuria/CKD (annually): Needs at next visit  Last pulmonary evaluation (asthma, WILFREDO, pulm HTN): unknown  Stroke/silent cerebral infarct Hx (Y/N): macular infarct ~2006, vision now normalized  Last PCP Visit: No established PCP  Vaccines:   Reported up to date. Flu shot (2021-22) from work    Plan last reviewed with patient: 1/24/2023    Patient background: 26 yo male who recently moved from [facility] in late 2020. 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 No children or significant others    Sickle Cell Disease History  Primary Hematologist: Janiya  Genotype: SS  Acute Pain Crisis Treatment: (Opioid-naive)  ER/Acute  Care/Infusion Clinic:   Morphine 1 mg IVP/SC Q1H X 3 doses  Toradol 30 mg IV x1  LR 1 L  Other: Zofran 8 mg IV  Inpatient:  Opioid: Morphine 1 mg IV Q1H PRN until PCA starts  Toradol 30 mg q6h x 3-4 days  PCA plan:  PCA button dose: Morphine 1 mg  Lockout: 20 minutes  Continuous Infusion: consider 0.5 mg/hr  Other Medications: Robaxin PRN, Zofran  Supportive Care: Docusate, Senna  Chronic Pain Medications:  none  Baseline Hemoglobin: 12 mg/dl  Hydroxyurea use: no  H/O blood transfusions: Yes (partial exchange since 2013, full transfusion protocol 2006-13, now fully chelated)  H/O Transfusion Reactions: no  Antibodies: none known  H/O Acute Chest Syndrome: none  Last episode: n/a  ICU/intubation: no  H/O Stroke: Yes (macular infarct ~2006, vision normalized)  H/O VTE: no  H/O Cholecystectomy or Splenectomy: Cholecystectomy (2011)  H/O Asthma, Pulm HTN, AVN, Leg Ulcers, Nephropathy, Retinopathy, etc: none      Review Of Systems:   ROS: 10 point ROS neg other than the symptoms noted above in the HPI.    Past Medical History:   Past Medical History:   Diagnosis Date    Gallstones 2011    s/p cholecystectomy    Hb-SS disease without crisis (H)     History of CVA (cerebrovascular accident) 07/2006    macular infarct, but regained vision    Iron overload due to repeated red blood cell transfusions 2013    s/p chelation    Sleep apnea     Treated with CPAP.       Past Surgical History:  Past Surgical History:   Procedure Laterality Date    CHOLECYSTECTOMY N/A 2011    LIVER BIOPSY N/A 2007    iron overload monitoring    LIVER BIOPSY N/A 2008    monitoring for iron overload       Past Family History:   Family History   Problem Relation Age of Onset    Sickle Cell Trait Mother     Sickle Cell Trait Father     No Known Problems Sister     No Known Problems Sister     Sickle Cell Trait Brother     Breast Cancer Maternal Grandmother     Breast Cancer Maternal Great-Grandmother        Social History:   Social History  "    Socioeconomic History    Marital status: Single     Spouse name: Not on file    Number of children: Not on file    Years of education: Not on file    Highest education level: Not on file   Occupational History    Not on file   Tobacco Use    Smoking status: Never    Smokeless tobacco: Never   Substance and Sexual Activity    Alcohol use: Yes     Alcohol/week: 2.0 standard drinks of alcohol     Types: 2 Standard drinks or equivalent per week     Comment: \"A few beers every other weekend.\"    Drug use: Never    Sexual activity: Not on file   Other Topics Concern    Not on file   Social History Narrative    Recently moved to [place]. 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     Social Determinants of Health     Financial Resource Strain: Not on file   Food Insecurity: Not on file   Transportation Needs: Not on file   Physical Activity: Not on file   Stress: Not on file   Social Connections: Not on file   Intimate Partner Violence: Not At Risk (9/12/2022)    Humiliation, Afraid, Rape, and Kick questionnaire     Fear of Current or Ex-Partner: No     Emotionally Abused: No     Physically Abused: No     Sexually Abused: No   Housing Stability: Not on file       Current Medications:    Current Outpatient Medications   Medication    acetaminophen-codeine (TYLENOL W/CODEINE #3) 300-30 MG per tablet    HYDROcodone-acetaminophen (NORCO) 5-325 MG tablet     No current facility-administered medications for this visit.       Physical Exam:  /79   Pulse 74   Temp 98.7  F (37.1  C) (Oral)   Resp 18   Wt 103.6 kg (228 lb 4.8 oz)   SpO2 98%   BMI 30.75 kg/m     General: Well-appearing male, NAD  Eyes: EOMI, PERRL. No scleral icterus.  ENT: Oral mucosa is moist without lesions or thrush.   Cardiovascular: RRR No murmurs, gallops, or rubs. No peripheral edema.  Respiratory: CTA bilaterally. No wheezes or crackles.  Neurologic: Grossly nonfocal.   Skin: No rashes, petechiae, or bruising noted " on exposed skin.      Labs:   Latest Reference Range & Units 07/14/23 12:38   WBC 4.0 - 11.0 10e3/uL 13.1 (H)   Hemoglobin 13.3 - 17.7 g/dL 9.6 (L)   Hematocrit 40.0 - 53.0 % 28.7 (L)   Platelet Count 150 - 450 10e3/uL 445   RBC Count 4.40 - 5.90 10e6/uL 3.81 (L)   MCV 78 - 100 fL 75 (L)   MCH 26.5 - 33.0 pg 25.2 (L)   MCHC 31.5 - 36.5 g/dL 33.4   RDW 10.0 - 15.0 % 23.2 (H)   % Neutrophils % 62   % Lymphocytes % 20   % Monocytes % 10   % Eosinophils % 6   % Basophils % 1   Absolute Basophils 0.0 - 0.2 10e3/uL 0.2   Absolute Eosinophils 0.0 - 0.7 10e3/uL 0.8 (H)   Absolute Immature Granulocytes <=0.4 10e3/uL 0.1   Absolute Lymphocytes 0.8 - 5.3 10e3/uL 2.6   Absolute Monocytes 0.0 - 1.3 10e3/uL 1.3   % Immature Granulocytes % 1   Absolute Neutrophils 1.6 - 8.3 10e3/uL 8.2   Absolute NRBCs 10e3/uL 0.1   NRBCs per 100 WBC <1 /100 1 (H)       Assessment:   Ricky Pak is a 27 year old male with HbSS Disease and a history of macular infarct who has elected to continue to receive sickle cell care in [place] routinely. He continues to travel for work every few months, currently residing in [place]. He remains on an exchange transfusion regimen monthly.  He did experience a hypotensive episode following a previous apheresis procedure consisting of depletion followed by exchange.  He now receives regular RBC exchange monthly.    He has no current concerns.  He is due for dental follow-up and admits that he has postponed this.  He will be due for ophthalmology follow-up next month.  We can delay this for a few months to allow for coordination with his pre-existing apheresis visits.    Plan:  1) Labs: CBC, retic, type and screen at apheresis visit. UA and CMP on return.  2) Orders: Adult eye referral  3) Follow up: 4-6 months with Dr. Denson    35 minutes spent on the date of the encounter doing chart review, review of test results, interpretation of tests, patient visit and documentation     Iris Roberts CNP

## 2023-07-14 NOTE — NURSING NOTE
"Oncology Rooming Note    July 14, 2023 12:57 PM   Ricky Pak is a 27 year old male who presents for:    Chief Complaint   Patient presents with     Blood Draw     Labs collected from venipuncture by RN. Vitals taken. Checked in for appointment(s).      Oncology Clinic Visit     Sickle Cell Anemia     Initial Vitals: /79   Pulse 74   Temp 98.7  F (37.1  C) (Oral)   Resp 18   Wt 103.6 kg (228 lb 4.8 oz)   SpO2 98%   BMI 30.75 kg/m   Estimated body mass index is 30.75 kg/m  as calculated from the following:    Height as of 6/5/23: 1.835 m (6' 0.24\").    Weight as of this encounter: 103.6 kg (228 lb 4.8 oz). Body surface area is 2.3 meters squared.  No Pain (0) Comment: Data Unavailable   No LMP for male patient.  Allergies reviewed: Yes  Medications reviewed: Yes    Medications: Medication refills not needed today.  Pharmacy name entered into [facility]: [place], [place] - [id number]    Clinical concerns: None        Allison England LPN  7/14/2023              "

## 2023-07-17 ENCOUNTER — HOSPITAL ENCOUNTER (OUTPATIENT)
Dept: LAB | Facility: CLINIC | Age: 28
Discharge: HOME | End: 2023-07-17
Attending: PEDIATRICS | Admitting: PEDIATRICS
Payer: COMMERCIAL

## 2023-07-17 VITALS
SYSTOLIC BLOOD PRESSURE: 160 MMHG | TEMPERATURE: 97.6 F | HEIGHT: 72 IN | BODY MASS INDEX: 30.94 KG/M2 | DIASTOLIC BLOOD PRESSURE: 82 MMHG | HEART RATE: 79 BPM | WEIGHT: 228.4 LBS | RESPIRATION RATE: 16 BRPM

## 2023-07-17 LAB
HCT VFR BLD AUTO: 30.2 % (ref 40–53)
HCT VFR BLD AUTO: 31.4 % (ref 40–53)
HGB BLD-MCNC: 10.1 G/DL (ref 13.3–17.7)
HGB BLD-MCNC: 10.2 G/DL (ref 13.3–17.7)

## 2023-07-17 PROCEDURE — P9016 RBC LEUKOCYTES REDUCED: HCPCS

## 2023-07-17 PROCEDURE — 999N000127 HC STATISTIC PERIPHERAL IV START W US GUIDANCE

## 2023-07-17 PROCEDURE — 85018 HEMOGLOBIN: CPT

## 2023-07-17 PROCEDURE — 250N000013 HC RX MED GY IP 250 OP 250 PS 637

## 2023-07-17 PROCEDURE — 250N000009 HC RX 250

## 2023-07-17 PROCEDURE — 36512 APHERESIS RBC: CPT

## 2023-07-17 PROCEDURE — 83021 HEMOGLOBIN CHROMOTOGRAPHY: CPT

## 2023-07-17 PROCEDURE — 36415 COLL VENOUS BLD VENIPUNCTURE: CPT

## 2023-07-17 RX ORDER — HYDROXYZINE HYDROCHLORIDE 25 MG/1
25 TABLET, FILM COATED ORAL ONCE
Status: COMPLETED | OUTPATIENT
Start: 2023-07-17 | End: 2023-07-17

## 2023-07-17 RX ADMIN — HYDROXYZINE HYDROCHLORIDE 25 MG: 25 TABLET, FILM COATED ORAL at 08:27

## 2023-07-17 RX ADMIN — ANTICOAGULANT CITRATE DEXTROSE SOLUTION FORMULA A 481 ML: 12.25; 11; 3.65 SOLUTION INTRAVENOUS at 09:05

## 2023-07-17 NOTE — DISCHARGE INSTRUCTIONS
Apheresis Blood Donor Center Post Instructions  You may feel tired after your procedure today.   Please call your doctor if you have:  bleeding that doesn t stop, fever, pain where a needle or tube (catheter) was placed, seizures, trouble breathing, red urine, nausea or vomiting, other health concerns.     If your symptoms are severe, call 911.  If your veins were used, keep the bandages on for 2-4 hours.  Avoid heavy lifting with your arms.  If bleeding occurs from these sites, apply firm pressure for 5-10 minutes.  Call your physician if bleeding continues.    The Apheresis/Blood Donor Center is open Monday-Friday 7:30 a.m. to 5 p.m.  The phone number is [phone].  A Transfusion Medicine physician can be reached after 5:00 p.m. weekdays and on weekends /Holidays by calling [phone], and asking for the physician on call.        Red blood cell exchange:   If you received blood products (plasma or red blood cells) as part of your treatment, you need to be aware that transfusion reactions can occur up to several hours after they have been given to you.  Call your physician if you experience any symptoms in the next 48 hours, including breathing problems, rash, itching, hives, nausea or vomiting, fever or chills, blood in your urine or stools, or joint pain.  Please inform the Transfusion Medicine Physician by calling [phone] and asking for the physician on call.      After Your Blood Transfusion  Discharge Instructions  After you leave  After you have a blood transfusion,* watch for signs of a transfusion reaction for the next 48 hours.   Signs to watch for:  Shaking or chills  Fever above 100.4 F  Headache  Nausea (feeling sich to your stomach)  Hives  Itching  Swelling of the face or feeling flushed  Ongoing dry cough (nothing is coughed up)  Trouble breathing, or wheezing  Call your clinic or 911, or go to the Emergency Room, if you have any signs of a transfusion reaction.  After the first 48  hours  Some signs of a reaction won't show up for a few days or up to 4 weeks. Call your clinic if you have symptoms of a transfusion reaction.  These may include:  Fatigue (feeling very tired)  Dizziness  Pink or red urine  *A blood transfusion is when you receive red blood cells, platelets, plasma or cryoprecipitate.   For informational purposes only. Not to replace the advice of your health care provider. Copyright   2015 [place] [facility]. All rights reserved. [employer] [id number] - Rev 02/22.

## 2023-07-17 NOTE — PROCEDURES
Laboratory Medicine and Pathology  Transfusion Medicine - Apheresis Procedure: RBC Exchange    Ricky Pak MRN# [id number]   YOB: 1995 Age: 27 year old   Date of Admission: 7/17/2023     Reason for procedure: Sickle Cell Disease (SCD)           Assessment and Plan:     Ricky Pak is a 27 year old male with sickle cell disease (SCD) who underwent maintenance RBC exchange. Peripheral veins were used for access and he tolerated the procedure well.  No concerns or complaints were reported and he has remained in good health since last RBC exchange.  His SCD remains under good control with regular RBC exchange and the plan is to continue monthly RBC exchanges.             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 He began apheresis-based RBC exchanges around 2013 following his issues with iron overload. These exchanges have done an excellent job preventing sickle cell crises. He did have a hypotensive episode following a previous apheresis procedure here which was a depletion followed by an exchange, so in future exchanges this will be avoided.              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             Past Surgical History:     Past Surgical History:   Procedure Laterality Date    CHOLECYSTECTOMY N/A 2011    LIVER BIOPSY N/A 2007    iron overload monitoring    LIVER BIOPSY N/A 2008    monitoring for iron overload          Allergies:     Allergies   Allergen Reactions     Diphenhydramine Hives           Medications:     Current Outpatient Medications   Medication Sig    acetaminophen-codeine (TYLENOL W/CODEINE #3) 300-30 MG per tablet Take 1-2 tablets by mouth as needed    HYDROcodone-acetaminophen (NORCO) 5-325 MG tablet Take 1-2 tablets by mouth as needed     No current facility-administered medications for this encounter.          Data:     Results for orders placed or performed during the hospital encounter of 07/17/23 (from the past 24 hour(s))   Hemoglobin and hematocrit   Result Value Ref Range    Hemoglobin 10.2 (L) 13.3 - 17.7 g/dL    Hematocrit 31.4 (L) 40.0 - 53.0 %   Hemoglobin and hematocrit   Result Value Ref Range    Hemoglobin 10.1 (L) 13.3 - 17.7 g/dL    Hematocrit 30.2 (L) 40.0 - 53.0 %         Procedure Summary:   A red blood cell exchange was performed using donor red blood cells as the replacement product.  Peripheral veins were used for access and allowed for appropriate flow during the procedure.  ACD-A was used for anticoagulation. Vital signs were stable.  See apheresis flowsheet for additional details.     ATTESTATION STATEMENT:   During the procedure this patient was directly seen and evaluated by me, Brady Chan MD, PhD and discussed with the apheresis nursing staff.

## 2023-07-19 LAB
HGB S BLD QL: NORMAL
HGB S BLD QL: NORMAL

## 2023-08-06 LAB
ABO/RH(D): NORMAL
ANTIBODY SCREEN: NEGATIVE
SPECIMEN EXPIRATION DATE: NORMAL

## 2023-08-07 ENCOUNTER — LAB (OUTPATIENT)
Dept: LAB | Facility: CLINIC | Age: 28
End: 2023-08-07
Attending: PEDIATRICS
Payer: COMMERCIAL

## 2023-08-07 DIAGNOSIS — D57.1 HB-SS DISEASE WITHOUT CRISIS (H): ICD-10-CM

## 2023-08-07 LAB
BASOPHILS # BLD AUTO: 0.1 10E3/UL (ref 0–0.2)
BASOPHILS NFR BLD AUTO: 1 %
EOSINOPHIL # BLD AUTO: 0.8 10E3/UL (ref 0–0.7)
EOSINOPHIL NFR BLD AUTO: 9 %
ERYTHROCYTE [DISTWIDTH] IN BLOOD BY AUTOMATED COUNT: 22.7 % (ref 10–15)
HCT VFR BLD AUTO: 31.5 % (ref 40–53)
HGB BLD-MCNC: 9.9 G/DL (ref 13.3–17.7)
IMM GRANULOCYTES # BLD: 0 10E3/UL
IMM GRANULOCYTES NFR BLD: 0 %
LYMPHOCYTES # BLD AUTO: 2.2 10E3/UL (ref 0.8–5.3)
LYMPHOCYTES NFR BLD AUTO: 26 %
MCH RBC QN AUTO: 23.7 PG (ref 26.5–33)
MCHC RBC AUTO-ENTMCNC: 31.4 G/DL (ref 31.5–36.5)
MCV RBC AUTO: 76 FL (ref 78–100)
MONOCYTES # BLD AUTO: 0.6 10E3/UL (ref 0–1.3)
MONOCYTES NFR BLD AUTO: 7 %
NEUTROPHILS # BLD AUTO: 4.7 10E3/UL (ref 1.6–8.3)
NEUTROPHILS NFR BLD AUTO: 57 %
NRBC # BLD AUTO: 0.2 10E3/UL
NRBC BLD AUTO-RTO: 2 /100
PLATELET # BLD AUTO: 641 10E3/UL (ref 150–450)
RBC # BLD AUTO: 4.17 10E6/UL (ref 4.4–5.9)
RETICS # AUTO: 0.14 10E6/UL (ref 0.03–0.1)
RETICS/RBC NFR AUTO: 3.4 % (ref 0.5–2)
WBC # BLD AUTO: 8.4 10E3/UL (ref 4–11)

## 2023-08-07 PROCEDURE — 86901 BLOOD TYPING SEROLOGIC RH(D): CPT

## 2023-08-07 PROCEDURE — 86850 RBC ANTIBODY SCREEN: CPT

## 2023-08-07 PROCEDURE — 83021 HEMOGLOBIN CHROMOTOGRAPHY: CPT

## 2023-08-07 PROCEDURE — 86923 COMPATIBILITY TEST ELECTRIC: CPT

## 2023-08-07 PROCEDURE — 36415 COLL VENOUS BLD VENIPUNCTURE: CPT

## 2023-08-07 PROCEDURE — 85025 COMPLETE CBC W/AUTO DIFF WBC: CPT

## 2023-08-07 PROCEDURE — 85045 AUTOMATED RETICULOCYTE COUNT: CPT

## 2023-08-07 NOTE — NURSING NOTE
Chief Complaint   Patient presents with    Labs Only     Blood drawn via VPT by LPN.      RACHEL Bernard LPN

## 2023-08-09 ENCOUNTER — HOSPITAL ENCOUNTER (OUTPATIENT)
Dept: LAB | Facility: CLINIC | Age: 28
Discharge: HOME | End: 2023-08-09
Attending: PEDIATRICS | Admitting: PEDIATRICS
Payer: COMMERCIAL

## 2023-08-09 VITALS
HEIGHT: 72 IN | HEART RATE: 79 BPM | RESPIRATION RATE: 20 BRPM | SYSTOLIC BLOOD PRESSURE: 114 MMHG | TEMPERATURE: 98.4 F | BODY MASS INDEX: 30.91 KG/M2 | DIASTOLIC BLOOD PRESSURE: 75 MMHG | WEIGHT: 228.18 LBS

## 2023-08-09 LAB
BLD PROD TYP BPU: NORMAL
BLOOD COMPONENT TYPE: NORMAL
CODING SYSTEM: NORMAL
CROSSMATCH: NORMAL
HCT VFR BLD AUTO: 31 % (ref 40–53)
HCT VFR BLD AUTO: 33.9 % (ref 40–53)
HGB BLD-MCNC: 10 G/DL (ref 13.3–17.7)
HGB BLD-MCNC: 11.6 G/DL (ref 13.3–17.7)
HGB S BLD QL: NORMAL
ISSUE DATE AND TIME: NORMAL
UNIT ABO/RH: NORMAL
UNIT NUMBER: NORMAL
UNIT STATUS: NORMAL
UNIT TYPE ISBT: 9500

## 2023-08-09 PROCEDURE — 85018 HEMOGLOBIN: CPT | Performed by: PATHOLOGY

## 2023-08-09 PROCEDURE — 250N000009 HC RX 250: Performed by: PATHOLOGY

## 2023-08-09 PROCEDURE — 36415 COLL VENOUS BLD VENIPUNCTURE: CPT | Performed by: PATHOLOGY

## 2023-08-09 PROCEDURE — 83021 HEMOGLOBIN CHROMOTOGRAPHY: CPT | Performed by: PATHOLOGY

## 2023-08-09 PROCEDURE — 999N000127 HC STATISTIC PERIPHERAL IV START W US GUIDANCE

## 2023-08-09 PROCEDURE — 250N000013 HC RX MED GY IP 250 OP 250 PS 637: Performed by: PATHOLOGY

## 2023-08-09 PROCEDURE — 36512 APHERESIS RBC: CPT

## 2023-08-09 PROCEDURE — P9016 RBC LEUKOCYTES REDUCED: HCPCS

## 2023-08-09 RX ORDER — HYDROXYZINE HYDROCHLORIDE 50 MG/1
50 TABLET, FILM COATED ORAL EVERY 6 HOURS PRN
Status: DISCONTINUED | OUTPATIENT
Start: 2023-08-09 | End: 2023-08-09

## 2023-08-09 RX ORDER — HYDROXYZINE HYDROCHLORIDE 25 MG/1
25 TABLET, FILM COATED ORAL EVERY 6 HOURS PRN
Status: DISCONTINUED | OUTPATIENT
Start: 2023-08-09 | End: 2023-08-10 | Stop reason: HOSPADM

## 2023-08-09 RX ADMIN — ANTICOAGULANT CITRATE DEXTROSE SOLUTION FORMULA A 536 ML: 12.25; 11; 3.65 SOLUTION INTRAVENOUS at 09:22

## 2023-08-09 RX ADMIN — HYDROXYZINE HYDROCHLORIDE 25 MG: 25 TABLET, FILM COATED ORAL at 08:36

## 2023-08-09 NOTE — DISCHARGE INSTRUCTIONS
Red blood cell exchange:   You received blood products (red blood cells) as part of your treatment, you need to be aware that transfusion reactions can occur up to several hours after they have been given to you.  Call your physician if you experience any symptoms in the next 48 hours, including breathing problems, rash, itching, hives, nausea or vomiting, fever or chills, blood in your urine or stools, or joint pain.  Please inform the Transfusion Medicine Physician by calling [phone] and asking for the physician on call. Apheresis open 7:30AM-5PM [phone]

## 2023-08-09 NOTE — PROCEDURES
Laboratory Medicine and Pathology  Transfusion Medicine - Apheresis Procedure: RBC Exchange    Rciky Pak MRN# [id number]   YOB: 1995 Age: 27 year old   Date of Admission: 8/9/2023     Reason for procedure: Sickle Cell Disease (SCD)           Assessment and Plan:     Ricky Pak is a 27 year old male with sickle cell disease (SCD) who underwent maintenance RBC exchange. Peripheral veins were used for access and he tolerated the procedure well.  No concerns or complaints were reported and he has remained in good health since last RBC exchange.  His SCD remains under good control with regular RBC exchange and the plan is to continue monthly RBC exchanges.             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 He began apheresis-based RBC exchanges around 2013 following his issues with iron overload. These exchanges have done an excellent job preventing sickle cell crises. He did have a hypotensive episode following a previous apheresis procedure here which was a depletion followed by an exchange, so in future exchanges this will be avoided.              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             Past Surgical History:     Past Surgical History:   Procedure Laterality Date    CHOLECYSTECTOMY N/A 2011    LIVER BIOPSY N/A 2007    iron overload monitoring    LIVER BIOPSY N/A 2008    monitoring for iron overload          Allergies:     Allergies   Allergen Reactions     Diphenhydramine Hives           Medications:     Current Outpatient Medications   Medication Sig    acetaminophen-codeine (TYLENOL W/CODEINE #3) 300-30 MG per tablet Take 1-2 tablets by mouth as needed    HYDROcodone-acetaminophen (NORCO) 5-325 MG tablet Take 1-2 tablets by mouth as needed     Current Facility-Administered Medications   Medication    hydrOXYzine (ATARAX) tablet 25 mg    Or    hydrOXYzine (ATARAX) tablet 50 mg          Data:     Results for orders placed or performed during the hospital encounter of 08/09/23 (from the past 24 hour(s))   Hemoglobin and hematocrit   Result Value Ref Range    Hemoglobin 10.0 (L) 13.3 - 17.7 g/dL    Hematocrit 31.0 (L) 40.0 - 53.0 %         Procedure Summary:   A red blood cell exchange was performed using donor red blood cells as the replacement product.  Peripheral veins were used for access and allowed for appropriate flow during the procedure.  ACD-A was used for anticoagulation. Vital signs were stable.  See apheresis flowsheet for additional details.     ATTESTATION STATEMENT:   During the procedure this patient was directly seen and evaluated by me , Sofi Ochoa MD, PhD.    Sofi Ochoa MD, PhD  Transfusion Medicine Attending  Medical Director, Blood Bank Laboratory  Pager [phone]

## 2023-08-11 LAB
HGB S BLD QL: NORMAL
HGB S BLD QL: NORMAL

## 2023-09-21 LAB
ABO/RH(D): NORMAL
ANTIBODY SCREEN: NEGATIVE
SPECIMEN EXPIRATION DATE: NORMAL

## 2023-09-22 ENCOUNTER — LAB (OUTPATIENT)
Dept: LAB | Facility: CLINIC | Age: 28
End: 2023-09-22
Attending: PEDIATRICS
Payer: COMMERCIAL

## 2023-09-22 DIAGNOSIS — D57.1 HB-SS DISEASE WITHOUT CRISIS (H): ICD-10-CM

## 2023-09-22 LAB
BASOPHILS # BLD AUTO: 0.2 10E3/UL (ref 0–0.2)
BASOPHILS NFR BLD AUTO: 2 %
EOSINOPHIL # BLD AUTO: 0.5 10E3/UL (ref 0–0.7)
EOSINOPHIL NFR BLD AUTO: 6 %
ERYTHROCYTE [DISTWIDTH] IN BLOOD BY AUTOMATED COUNT: 27.6 % (ref 10–15)
HCT VFR BLD AUTO: 32.3 % (ref 40–53)
HGB BLD-MCNC: 10.5 G/DL (ref 13.3–17.7)
IMM GRANULOCYTES # BLD: 0 10E3/UL
IMM GRANULOCYTES NFR BLD: 0 %
LYMPHOCYTES # BLD AUTO: 2.9 10E3/UL (ref 0.8–5.3)
LYMPHOCYTES NFR BLD AUTO: 35 %
MCH RBC QN AUTO: 23.9 PG (ref 26.5–33)
MCHC RBC AUTO-ENTMCNC: 32.5 G/DL (ref 31.5–36.5)
MCV RBC AUTO: 74 FL (ref 78–100)
MONOCYTES # BLD AUTO: 0.8 10E3/UL (ref 0–1.3)
MONOCYTES NFR BLD AUTO: 10 %
NEUTROPHILS # BLD AUTO: 4 10E3/UL (ref 1.6–8.3)
NEUTROPHILS NFR BLD AUTO: 47 %
NRBC # BLD AUTO: 0.1 10E3/UL
NRBC BLD AUTO-RTO: 1 /100
PLATELET # BLD AUTO: 534 10E3/UL (ref 150–450)
RBC # BLD AUTO: 4.39 10E6/UL (ref 4.4–5.9)
RETICS # AUTO: 0.16 10E6/UL (ref 0.03–0.1)
RETICS/RBC NFR AUTO: 3.5 % (ref 0.5–2)
WBC # BLD AUTO: 8.3 10E3/UL (ref 4–11)

## 2023-09-22 PROCEDURE — 86923 COMPATIBILITY TEST ELECTRIC: CPT | Performed by: PEDIATRICS

## 2023-09-22 PROCEDURE — 36415 COLL VENOUS BLD VENIPUNCTURE: CPT

## 2023-09-22 PROCEDURE — 83021 HEMOGLOBIN CHROMOTOGRAPHY: CPT

## 2023-09-22 PROCEDURE — 86900 BLOOD TYPING SEROLOGIC ABO: CPT

## 2023-09-22 PROCEDURE — 85025 COMPLETE CBC W/AUTO DIFF WBC: CPT

## 2023-09-22 PROCEDURE — 85045 AUTOMATED RETICULOCYTE COUNT: CPT

## 2023-09-22 RX ORDER — HYDROXYZINE HYDROCHLORIDE 25 MG/1
25 TABLET, FILM COATED ORAL ONCE
Status: CANCELLED | OUTPATIENT
Start: 2023-09-25

## 2023-09-25 ENCOUNTER — HOSPITAL ENCOUNTER (OUTPATIENT)
Dept: LAB | Facility: CLINIC | Age: 28
Discharge: HOME | End: 2023-09-25
Attending: PEDIATRICS | Admitting: PEDIATRICS
Payer: COMMERCIAL

## 2023-09-25 VITALS
TEMPERATURE: 98.4 F | DIASTOLIC BLOOD PRESSURE: 84 MMHG | BODY MASS INDEX: 31.06 KG/M2 | RESPIRATION RATE: 16 BRPM | HEART RATE: 89 BPM | SYSTOLIC BLOOD PRESSURE: 130 MMHG | WEIGHT: 230.6 LBS

## 2023-09-25 LAB
HCT VFR BLD AUTO: 32.8 % (ref 40–53)
HCT VFR BLD AUTO: 33.1 % (ref 40–53)
HGB BLD-MCNC: 11 G/DL (ref 13.3–17.7)
HGB BLD-MCNC: 11.4 G/DL (ref 13.3–17.7)

## 2023-09-25 PROCEDURE — 250N000013 HC RX MED GY IP 250 OP 250 PS 637: Performed by: STUDENT IN AN ORGANIZED HEALTH CARE EDUCATION/TRAINING PROGRAM

## 2023-09-25 PROCEDURE — 36512 APHERESIS RBC: CPT

## 2023-09-25 PROCEDURE — P9016 RBC LEUKOCYTES REDUCED: HCPCS | Performed by: PEDIATRICS

## 2023-09-25 PROCEDURE — 36415 COLL VENOUS BLD VENIPUNCTURE: CPT | Performed by: STUDENT IN AN ORGANIZED HEALTH CARE EDUCATION/TRAINING PROGRAM

## 2023-09-25 PROCEDURE — 250N000009 HC RX 250: Performed by: STUDENT IN AN ORGANIZED HEALTH CARE EDUCATION/TRAINING PROGRAM

## 2023-09-25 PROCEDURE — 85018 HEMOGLOBIN: CPT | Performed by: STUDENT IN AN ORGANIZED HEALTH CARE EDUCATION/TRAINING PROGRAM

## 2023-09-25 PROCEDURE — 83021 HEMOGLOBIN CHROMOTOGRAPHY: CPT | Performed by: STUDENT IN AN ORGANIZED HEALTH CARE EDUCATION/TRAINING PROGRAM

## 2023-09-25 RX ORDER — HYDROXYZINE HYDROCHLORIDE 25 MG/1
25 TABLET, FILM COATED ORAL ONCE
Status: COMPLETED | OUTPATIENT
Start: 2023-09-25 | End: 2023-09-25

## 2023-09-25 RX ADMIN — HYDROXYZINE HYDROCHLORIDE 25 MG: 25 TABLET, FILM COATED ORAL at 08:29

## 2023-09-25 RX ADMIN — ANTICOAGULANT CITRATE DEXTROSE SOLUTION FORMULA A 478 ML: 12.25; 11; 3.65 SOLUTION INTRAVENOUS at 08:50

## 2023-09-25 NOTE — DISCHARGE INSTRUCTIONS
Red blood cell exchange:   If you received blood products (plasma or red blood cells) as part of your treatment, you need to be aware that transfusion reactions can occur up to several hours after they have been given to you.  Call your physician if you experience any symptoms in the next 48 hours, including breathing problems, rash, itching, hives, nausea or vomiting, fever or chills, blood in your urine or stools, or joint pain.  Please inform the Transfusion Medicine Physician by calling [phone] and asking for the physician on call.  After Your Blood Transfusion  Discharge Instructions  After you leave  After you have a blood transfusion,* watch for signs of a transfusion reaction for the next 48 hours.   Signs to watch for:  Shaking or chills  Fever above 100.4 F  Headache  Nausea (feeling sich to your stomach)  Hives  Itching  Swelling of the face or feeling flushed  Ongoing dry cough (nothing is coughed up)  Trouble breathing, or wheezing  Call your clinic or 911, or go to the Emergency Room, if you have any signs of a transfusion reaction.  After the first 48 hours  Some signs of a reaction won't show up for a few days or up to 4 weeks. Call your clinic if you have symptoms of a transfusion reaction.  These may include:  Fatigue (feeling very tired)  Dizziness  Pink or red urine  *A blood transfusion is when you receive red blood cells, platelets, plasma or cryoprecipitate.   For informational purposes only. Not to replace the advice of your health care provider. Copyright   2015 [employer]. All rights reserved. [employer] 881857 - Rev 02/22.    Apheresis Blood Donor Center Post Instructions  You may feel tired after your procedure today.   Please call your doctor if you have:  bleeding that doesn t stop, fever, pain where a needle or tube (catheter) was placed, seizures, trouble breathing, red urine, nausea or vomiting, other health concerns.     If your symptoms are severe, call 911.  If  your veins were used, keep the bandages on for 2-4 hours.  Avoid heavy lifting with your arms.  If bleeding occurs from these sites, apply firm pressure for 5-10 minutes.  Call your physician if bleeding continues.    The Apheresis/Blood Donor Center is open Monday-Friday 7:30 a.m. to 5 p.m.  The phone number is [phone].  A Transfusion Medicine physician can be reached after 5:00 p.m. weekdays and on weekends /Holidays by calling [phone], and asking for the physician on call.

## 2023-09-25 NOTE — PROCEDURES
Laboratory Medicine and Pathology   Transfusion Medicine - Apheresis Procedure: RBC Exchange     Ricky Pak MRN# [id number]   YOB: 1995 Age: 27 year old     Reason for procedure: Red blood cell (RBC) exchange for sickle cell disease (SCD)           Assessment and Plan:      Ricky Pak is a 27 year old male with sickle cell disease (SCD) who underwent maintenance RBC exchange this AM. Peripheral veins were used for access.  After some difficulty with access, he tolerated the procedure well with no concerns/complaints.  He has remained in good health since last RBC exchange.  No pain crises, etc..  His SCD continues to be under good control with regular monthly RBC exchanges.   Continue with current treatment plan as per Hematology.            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 He began apheresis-based RBC exchanges around 2013 following his issues with iron overload. These exchanges have done an excellent job preventing sickle cell crises. He did have a hypotensive episode following a previous apheresis procedure here which was a depletion followed by an exchange, so we have avoided depletion-exchanges and gone with conventional RBC exchanges..              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             Past Surgical History:     Past Surgical History:   Procedure Laterality Date     CHOLECYSTECTOMY N/A 2011     LIVER BIOPSY N/A 2007    iron overload  monitoring     LIVER BIOPSY N/A 2008    monitoring for iron overload             Allergies:        Allergies   Allergen Reactions     Diphenhydramine Hives            Medications:     Current Outpatient Medications   Medication     acetaminophen-codeine (TYLENOL W/CODEINE #3) 300-30 MG per tablet     HYDROcodone-acetaminophen (NORCO) 5-325 MG tablet     No current facility-administered medications for this encounter.            Data:      CBCRecent Labs   Lab 09/25/23  1145 09/25/23  0830 09/22/23  1236   WBC  --   --  8.3   RBC  --   --  4.39*   HGB 11.4* 11.0* 10.5*   HCT 32.8* 33.1* 32.3*   MCV  --   --  74*   MCH  --   --  23.9*   MCHC  --   --  32.5   RDW  --   --  27.6*   PLT  --   --  534*           Procedure Summary:   An RBC exchange was performed using donor red blood cells as the replacement product.  Peripheral veins were used for access, and this allowed for appropriate flow during the procedure.  ACD-A was used for anticoagulation. Vital signs were stable.  See apheresis flowsheet for additional details.     ATTESTATION STATEMENT:   During the procedure this patient was directly seen and evaluated by me, Spencer Mejia M.D..    Spencer Mejia M.D.  Professor, Transfusion Medicine  Laboratory Medicine & Pathology  Pager: [phone]

## 2023-09-27 LAB
HGB S BLD QL: NORMAL
HGB S BLD QL: NORMAL

## 2023-10-26 ENCOUNTER — PATIENT OUTREACH (OUTPATIENT)
Dept: ONCOLOGY | Facility: CLINIC | Age: 28
End: 2023-10-26
Payer: COMMERCIAL

## 2023-10-26 LAB
ABO/RH(D): NORMAL
ANTIBODY SCREEN: NEGATIVE
SPECIMEN EXPIRATION DATE: NORMAL

## 2023-10-26 NOTE — PROGRESS NOTES
[facility]: Cancer Care                                                                                          LM for pt to call.  Janiya needs Ricky to reschedule his 11/6/23 appt.  Offered pt to either schedule with Iris on 10/30 after his apheresis or at a later date with Janiya.  Will await call back.  10/30/23 appt placed on hold of Iris's schedule.      Signature:  Paula Viera RN

## 2023-10-27 ENCOUNTER — LAB (OUTPATIENT)
Dept: LAB | Facility: CLINIC | Age: 28
End: 2023-10-27
Attending: PEDIATRICS
Payer: COMMERCIAL

## 2023-10-27 DIAGNOSIS — D57.1 HB-SS DISEASE WITHOUT CRISIS (H): ICD-10-CM

## 2023-10-27 LAB
BASOPHILS # BLD AUTO: 0.1 10E3/UL (ref 0–0.2)
BASOPHILS NFR BLD AUTO: 1 %
EOSINOPHIL # BLD AUTO: 0.5 10E3/UL (ref 0–0.7)
EOSINOPHIL NFR BLD AUTO: 5 %
ERYTHROCYTE [DISTWIDTH] IN BLOOD BY AUTOMATED COUNT: 24.1 % (ref 10–15)
HCT VFR BLD AUTO: 32.8 % (ref 40–53)
HGB BLD-MCNC: 10.8 G/DL (ref 13.3–17.7)
IMM GRANULOCYTES # BLD: 0 10E3/UL
IMM GRANULOCYTES NFR BLD: 0 %
LYMPHOCYTES # BLD AUTO: 2.9 10E3/UL (ref 0.8–5.3)
LYMPHOCYTES NFR BLD AUTO: 32 %
MCH RBC QN AUTO: 24.7 PG (ref 26.5–33)
MCHC RBC AUTO-ENTMCNC: 32.9 G/DL (ref 31.5–36.5)
MCV RBC AUTO: 75 FL (ref 78–100)
MONOCYTES # BLD AUTO: 1 10E3/UL (ref 0–1.3)
MONOCYTES NFR BLD AUTO: 11 %
NEUTROPHILS # BLD AUTO: 4.6 10E3/UL (ref 1.6–8.3)
NEUTROPHILS NFR BLD AUTO: 51 %
NRBC # BLD AUTO: 0.1 10E3/UL
NRBC BLD AUTO-RTO: 1 /100
PLATELET # BLD AUTO: 553 10E3/UL (ref 150–450)
RBC # BLD AUTO: 4.38 10E6/UL (ref 4.4–5.9)
RETICS # AUTO: 0.19 10E6/UL (ref 0.03–0.1)
RETICS/RBC NFR AUTO: 4.2 % (ref 0.5–2)
WBC # BLD AUTO: 9.1 10E3/UL (ref 4–11)

## 2023-10-27 PROCEDURE — 36415 COLL VENOUS BLD VENIPUNCTURE: CPT

## 2023-10-27 PROCEDURE — 85045 AUTOMATED RETICULOCYTE COUNT: CPT

## 2023-10-27 PROCEDURE — 86901 BLOOD TYPING SEROLOGIC RH(D): CPT

## 2023-10-27 PROCEDURE — 86923 COMPATIBILITY TEST ELECTRIC: CPT

## 2023-10-27 PROCEDURE — 83021 HEMOGLOBIN CHROMOTOGRAPHY: CPT

## 2023-10-27 PROCEDURE — 85025 COMPLETE CBC W/AUTO DIFF WBC: CPT

## 2023-10-27 NOTE — NURSING NOTE
Chief Complaint   Patient presents with    Labs Only     Labs drawn via  by RN in lab.         Labs collected from venipuncture by RN.     Sammi Teixeira RN

## 2023-10-30 ENCOUNTER — ONCOLOGY VISIT (OUTPATIENT)
Dept: ONCOLOGY | Facility: CLINIC | Age: 28
End: 2023-10-30
Attending: REGISTERED NURSE
Payer: COMMERCIAL

## 2023-10-30 ENCOUNTER — HOSPITAL ENCOUNTER (OUTPATIENT)
Dept: LAB | Facility: CLINIC | Age: 28
Discharge: HOME | End: 2023-10-30
Attending: PEDIATRICS
Payer: COMMERCIAL

## 2023-10-30 VITALS
HEART RATE: 91 BPM | BODY MASS INDEX: 31.12 KG/M2 | SYSTOLIC BLOOD PRESSURE: 116 MMHG | DIASTOLIC BLOOD PRESSURE: 80 MMHG | RESPIRATION RATE: 18 BRPM | WEIGHT: 231.04 LBS | TEMPERATURE: 98.1 F

## 2023-10-30 DIAGNOSIS — D57.1 SICKLE CELL DISEASE WITHOUT CRISIS (H): Primary | ICD-10-CM

## 2023-10-30 DIAGNOSIS — D57.1 HB-SS DISEASE WITHOUT CRISIS (H): ICD-10-CM

## 2023-10-30 LAB
HCT VFR BLD AUTO: 31.9 % (ref 40–53)
HCT VFR BLD AUTO: 33.3 % (ref 40–53)
HGB BLD-MCNC: 10.4 G/DL (ref 13.3–17.7)
HGB BLD-MCNC: 10.7 G/DL (ref 13.3–17.7)

## 2023-10-30 PROCEDURE — P9016 RBC LEUKOCYTES REDUCED: HCPCS

## 2023-10-30 PROCEDURE — 36415 COLL VENOUS BLD VENIPUNCTURE: CPT | Performed by: PATHOLOGY

## 2023-10-30 PROCEDURE — 999N000127 HC STATISTIC PERIPHERAL IV START W US GUIDANCE

## 2023-10-30 PROCEDURE — 83021 HEMOGLOBIN CHROMOTOGRAPHY: CPT | Performed by: PATHOLOGY

## 2023-10-30 PROCEDURE — 250N000013 HC RX MED GY IP 250 OP 250 PS 637: Performed by: PATHOLOGY

## 2023-10-30 PROCEDURE — 36512 APHERESIS RBC: CPT

## 2023-10-30 PROCEDURE — 250N000009 HC RX 250: Performed by: PATHOLOGY

## 2023-10-30 PROCEDURE — 99214 OFFICE O/P EST MOD 30 MIN: CPT | Performed by: REGISTERED NURSE

## 2023-10-30 PROCEDURE — 85014 HEMATOCRIT: CPT | Performed by: PATHOLOGY

## 2023-10-30 RX ORDER — HYDROXYZINE HYDROCHLORIDE 25 MG/1
25 TABLET, FILM COATED ORAL ONCE
Status: COMPLETED | OUTPATIENT
Start: 2023-10-30 | End: 2023-10-30

## 2023-10-30 RX ORDER — HYDROXYZINE HYDROCHLORIDE 50 MG/1
50 TABLET, FILM COATED ORAL ONCE
Status: COMPLETED | OUTPATIENT
Start: 2023-10-30 | End: 2023-10-30

## 2023-10-30 RX ADMIN — HYDROXYZINE HYDROCHLORIDE 25 MG: 25 TABLET, FILM COATED ORAL at 08:53

## 2023-10-30 RX ADMIN — ANTICOAGULANT CITRATE DEXTROSE SOLUTION FORMULA A 450 ML: 12.25; 11; 3.65 SOLUTION INTRAVENOUS at 09:10

## 2023-10-30 NOTE — PROGRESS NOTES
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 He tolerated today's procedure well.  His/Her next procedure is tentatively scheduled ECP is for 11/29/2023.    Attestation:   This patient has been seen and evaluated by me, Spencer Harris. .        History of Present Illness   Ricky Pak is a 27 year old male with hemoglobin SS disease and a history of remote macular infarct (2006,  vision normalized) who has a history of chronic transfusions. He was on monthly transfusions from 2186-0484 with chelation and in 2013 he was switched to peripheral RBC exchange which has kept him in good health . He no longer needs chelation and is able to keep his HbS ~30% or below with regularity. He grew up in [place] and has spent most of his life in the [facility]. 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 He just got sent to [facility] and moved here one week ago. He denies regular pain and has not had a pain crisis since he was a teenager. He has kept up with his exchange transfusions and feels quite healthy overall. He regained the vision after having a macular infarct at age 10 and has no further neurologic symptoms since. He does not take any regular medications and has not had any opioids in many years either.      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             Past Surgical History:     Past Surgical History:   Procedure Laterality Date    CHOLECYSTECTOMY N/A 2011    LIVER BIOPSY N/A 2007    iron overload monitoring    LIVER BIOPSY N/A 2008    monitoring for iron overload          Social History:     Social History     Tobacco Use    Smoking status: Never    Smokeless tobacco: Never   Substance Use Topics    Alcohol use: Yes     Alcohol/week: 2.0 standard drinks of alcohol     Types: 2 Standard drinks or equivalent per week     Comment: \"A few beers every other weekend.\"            Allergies:     Allergies   Allergen Reactions    Diphenhydramine Hives             Medications:     Current Outpatient Medications   Medication Sig    acetaminophen-codeine (TYLENOL W/CODEINE #3) 300-30 MG per tablet Take 1-2 tablets by mouth as needed    HYDROcodone-acetaminophen (NORCO) 5-325 MG tablet Take 1-2 tablets by mouth as needed     No current facility-administered medications for this encounter.           Abbreviated Physical Exam:   /80   Pulse 91   Temp 98.1  F (36.7  C) (Oral)   Resp 18   Wt 104.8 kg (231 lb 0.7 oz)   BMI 31.12 kg/m    Patient was alert and oriented and in NAD.         Laboratory Data:   BMPNo lab results found in last 7 days.    CBC  Recent Labs   Lab 10/30/23  1105 10/30/23  0910 10/27/23  1225   WBC  --   --  9.1   RBC  --   --  4.38*   HGB 10.4* 10.7* 10.8*   HCT 31.9* 33.3* 32.8*   MCV  --   --  75*   MCH  --   --  24.7*   MCHC  --   --  32.9   RDW  --   --  24.1*   PLT  " --   --  553*            Procedure Summary:   A RBC exchange was performed with ~10 PRBC units .  Peripheral veins were used for access. ACD-A was for anticoagulation.   The patient's vital signs were stable throughout and he  tolerated the procedure well.    Attestation:   This patient has been seen and evaluated by me, Spencer Harris.   Spencer Harris MD   Division of Transfusion Medicine   Department [place] [address]   Pager: [phone]

## 2023-10-30 NOTE — DISCHARGE INSTRUCTIONS
Apheresis Blood Donor Center Post Instructions  You may feel tired after your procedure today.   Please call your doctor if you have:  bleeding that doesn t stop, fever, pain where a needle or tube (catheter) was placed, seizures, trouble breathing, red urine, nausea or vomiting, other health concerns.     If your symptoms are severe, call [phone].  If your veins were used, keep the bandages on for 4 hours.  Avoid heavy lifting with your arms.  If bleeding occurs from these sites, apply firm pressure for 5-10 minutes.  Call your physician if bleeding continues.    If you get a bruise:  1)  Apply ice to the area intermittently for 10-15 minutes during the first 24 hours.  2)  Thereafter, apply intermittent warm moist heat for 10-15 minutes to the area.  3)  A rainbow of colors may occur for about 10 days.    If you get a bruise larger than 2-3 inches in diameter, redness, swelling, or pain where the needle was, or tingling in your fingers or arm, contact the Apheresis/Blood Donor Center @ [phone].    The Apheresis/Blood Donor Center is open Monday-Friday 7:30 a.m. to 5 p.m.  The phone number is [phone].  A Transfusion Medicine physician can be reached after 5:00 p.m. weekdays and on weekends /Holidays by calling [phone], and asking for the physician on call.      Red blood cell exchange:   If you received blood products (plasma or red blood cells) as part of your treatment, you need to be aware that transfusion reactions can occur up to several hours after they have been given to you.  Call your physician if you experience any symptoms in the next 48 hours, including breathing problems, rash, itching, hives, nausea or vomiting, fever or chills, blood in your urine or stools, or joint pain.  Please inform the Transfusion Medicine Physician by calling [phone] and asking for the physician on call.

## 2023-10-30 NOTE — PROGRESS NOTES
Adult Sickle Cell Outpatient Clinic Note        Date of Visit: 10/30/23    Ricky Pak is a 27 year old male who is being seen as a follow up for SCD care. He has chosen to continue care in our system although his work schedule requires he travel every few months.  He is seen today for routine clinic follow-up while in apheresis for an exchange transfusion.    Interval History  Ricky is seen in apheresis today during his monthly exchange transfusion. He reports feeling well overall. He is currently in [place] for work and will be there for at least a few more weeks. He is not sure which state he will be transitioning to next. He denies any significant physical concerns. Pain is not typically an issue for him although he has been experiencing soreness around his ankles, particularly along his bilateral achilles tendons. This discomfort is mild and intermittent. He is on his feet often for work and notices this more when he's been standing for long period of time. He denies any focal warmth or swelling in the ankles or feet. Denies any calf pain or tightness. After moving to [place] he did develop viral cold symptoms which resolved within 1 week. The intermittent numbness and tingling that he was previously experiencing in his extremities has now resolved. He has recently received both a COVID booster and influenza vaccine through his employer. He has not yet caught up on his dental visits.    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 He was on monthly transfusions from 9792-7523 with chelation and in 2013 he was switched to peripheral RBC exchange which has kept him in great health since then. He no longer needs chelation and is able to keep his HbS ~30% or below with regularity. He grew up in [place] and has spent most of his life in the  [place]. 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 He just got sent to [facility] and moved here one week ago. He denies regular pain and has not had a pain crisis since he was a teenager. He has kept up with his exchange transfusions and feels quite healthy overall. He regained the vision after having a macular infarct at age 10 and has no further neurologic symptoms since. He does not take any regular medications and has not had any opioids in many years either. He has no complaints today. He met with the apheresis team before this visit and is squared away to be maintained on his exchange schedule here.    ---------------------------------------  Ricky Pak's Goals (discussed 10/30/23)    1-3 month goal:      6 month goal:      12 month goal:  Possibly look for a place to settle down permanently     Disease-specific goal(s):  -Dental follow-up  -Maintain good exchange regimens despite moving about the country. Plans to continue to return to [place] monthly.  ---------------------------------------      Sickle Cell Disease Comprehensive Checklist  Bone Health/Avascular Necrosis Screening/Symptoms (each visit):  none  Leg Ulcer evaluation (every visit): none  Hypertension (every visit): None  Last ophthalmologic exam: August 2022  Last urinalysis for microalbuminuria/CKD (annually): Needs at next visit  Last pulmonary evaluation (asthma, WILFREDO, pulm HTN): unknown, no current concerns  Stroke/silent cerebral infarct Hx (Y/N): macular infarct ~2006, vision now normalized, due for adult eye referral  Last PCP Visit: No established PCP  Vaccines:   Reported up to date. Received COVID booster and flu shot (2022-23) through employer.    Plan last reviewed with patient: 1/24/2023    Patient background: 28 yo male who recently moved from [facility] in  late 2020. 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 No children or significant others    Sickle Cell Disease History  Primary Hematologist: Janiya  Genotype: SS  Acute Pain Crisis Treatment: (Opioid-naive)  ER/Acute Care/Infusion Clinic:   Morphine 1 mg IVP/SC Q1H X 3 doses  Toradol 30 mg IV x1  LR 1 L  Other: Zofran 8 mg IV  Inpatient:  Opioid: Morphine 1 mg IV Q1H PRN until PCA starts  Toradol 30 mg q6h x 3-4 days  PCA plan:  PCA button dose: Morphine 1 mg  Lockout: 20 minutes  Continuous Infusion: consider 0.5 mg/hr  Other Medications: Robaxin PRN, Zofran  Supportive Care: Docusate, Senna  Chronic Pain Medications:  none  Baseline Hemoglobin: 12 mg/dl  Hydroxyurea use: no  H/O blood transfusions: Yes (partial exchange since 2013, full transfusion protocol 2006-13, now fully chelated)  H/O Transfusion Reactions: no  Antibodies: none known  H/O Acute Chest Syndrome: none  Last episode: n/a  ICU/intubation: no  H/O Stroke: Yes (macular infarct ~2006, vision normalized)  H/O VTE: no  H/O Cholecystectomy or Splenectomy: Cholecystectomy (2011)  H/O Asthma, Pulm HTN, AVN, Leg Ulcers, Nephropathy, Retinopathy, etc: none      Review Of Systems:   ROS: 10 point ROS neg other than the symptoms noted above in the HPI.    Past Medical History:   Past Medical History:   Diagnosis Date    Gallstones 2011    s/p cholecystectomy    Hb-SS disease without crisis (H)     History of CVA (cerebrovascular accident) 07/2006    macular infarct, but regained vision    Iron overload due to repeated red blood cell transfusions 2013    s/p chelation    Sleep apnea     Treated with CPAP.       Past Surgical History:  Past Surgical History:   Procedure Laterality Date    CHOLECYSTECTOMY N/A 2011    LIVER BIOPSY N/A 2007    iron overload monitoring    LIVER BIOPSY N/A 2008    monitoring for iron overload       Past Family History:   Family History   Problem Relation Age of Onset    Sickle Cell Trait Mother   "   Sickle Cell Trait Father     No Known Problems Sister     No Known Problems Sister     Sickle Cell Trait Brother     Breast Cancer Maternal Grandmother     Breast Cancer Maternal Great-Grandmother        Social History:   Social History     Socioeconomic History    Marital status: Single     Spouse name: Not on file    Number of children: Not on file    Years of education: Not on file    Highest education level: Not on file   Occupational History    Not on file   Tobacco Use    Smoking status: Never    Smokeless tobacco: Never   Substance and Sexual Activity    Alcohol use: Yes     Alcohol/week: 2.0 standard drinks of alcohol     Types: 2 Standard drinks or equivalent per week     Comment: \"A few beers every other weekend.\"    Drug use: Never    Sexual activity: Not on file   Other Topics Concern    Not on file   Social History Narrative    Recently moved to [place]. 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     Social Determinants of Health     Financial Resource Strain: Not on file   Food Insecurity: Not on file   Transportation Needs: Not on file   Physical Activity: Not on file   Stress: Not on file   Social Connections: Not on file   Interpersonal Safety: Not At Risk (9/12/2022)    Humiliation, Afraid, Rape, and Kick questionnaire     Fear of Current or Ex-Partner: No     Emotionally Abused: No     Physically Abused: No     Sexually Abused: No   Housing Stability: Not on file       Current Medications:    Current Outpatient Medications   Medication    acetaminophen-codeine (TYLENOL W/CODEINE #3) 300-30 MG per tablet    HYDROcodone-acetaminophen (NORCO) 5-325 MG tablet     No current facility-administered medications for this visit.       Physical Exam:   10/30/2023  8:20 AM   Vital Signs    Systolic 120    Diastolic 78    Pulse 75    Temperature 98.1  F (36.7  C)    Respirations 16    Weight (LB) 231 lb 0.7 oz          General: Well-appearing male, NAD  Eyes: EOMI, PERRL. No " scleral icterus.  Cardiovascular: RRR, no m/g/r. No peripheral edema.  Respiratory: CTA bilaterally. No wheezes or crackles.  Neurologic: Grossly nonfocal.   Skin: No rashes, petechiae, or bruising noted on exposed skin.    Labs:   Latest Reference Range & Units 10/27/23 12:25   WBC 4.0 - 11.0 10e3/uL 9.1   Hemoglobin 13.3 - 17.7 g/dL 10.8 (L)   Hematocrit 40.0 - 53.0 % 32.8 (L)   Platelet Count 150 - 450 10e3/uL 553 (H)   RBC Count 4.40 - 5.90 10e6/uL 4.38 (L)   MCV 78 - 100 fL 75 (L)   MCH 26.5 - 33.0 pg 24.7 (L)   MCHC 31.5 - 36.5 g/dL 32.9   RDW 10.0 - 15.0 % 24.1 (H)   % Neutrophils % 51   % Lymphocytes % 32   % Monocytes % 11   % Eosinophils % 5   % Basophils % 1   Absolute Basophils 0.0 - 0.2 10e3/uL 0.1   Absolute Eosinophils 0.0 - 0.7 10e3/uL 0.5   Absolute Immature Granulocytes <=0.4 10e3/uL 0.0   Absolute Lymphocytes 0.8 - 5.3 10e3/uL 2.9   Absolute Monocytes 0.0 - 1.3 10e3/uL 1.0   % Immature Granulocytes % 0   Absolute Neutrophils 1.6 - 8.3 10e3/uL 4.6   Absolute NRBCs 10e3/uL 0.1   NRBCs per 100 WBC <1 /100 1 (H)   % Retic 0.5 - 2.0 % 4.2 (H)   Absolute Retic 0.025 - 0.095 10e6/uL 0.189 (H)   (L): Data is abnormally low  (H): Data is abnormally high    Assessment:   Ricky Pak is a 27 year old male with HbSS Disease and a history of macular infarct who has elected to continue to receive sickle cell care in [place] routinely. He continues to travel for work every few months, currently residing in [place]. He remains on monthly exchange transfusions which he receives here in [place]. Denies current issues with exchange transfusions for vascular access.    He will continue to seek out dental evaluation. He is also due for a repeat ophthalmology exam. Will again request adult eye referral and aim to coordinate this with one of his monthly exchanges.     The mild pain in his ankles/achilles is suspicious for tendinitis although symptoms are not currently limiting. I suspect this is related to  spending sustained time on his feet at work. He will let me know if symptoms worsen or if he develops any persistent focal pain. I would be happy to see him back during one of his monthly exchange transfusions if needed for further evaluation.     Plan:  1) Labs: CBC, retic, type and screen at apheresis visit. UA and CMP on return.  2) Orders: Adult eye referral  3) Follow up: 4-6 months with Dr. Denson    35 minutes spent on the date of the encounter doing chart review, review of test results, patient visit, and documentation     Iris Roberts, CNP

## 2023-10-30 NOTE — Clinical Note
10/30/2023         RE: Ricky Pak  [address] [facility] [address]        Dear Colleague,    Thank you for referring your patient, Ricky Pak, to the [facility]. Please see a copy of my visit note below.    Adult Sickle Cell Outpatient Clinic Note        Date of Visit: 10/30/23    Ricky Pak is a 27 year old male who is being seen as a follow up for SCD care. He has chosen to continue care in our system although his work schedule requires he travel every few months.  He is seen today for routine clinic follow-up while in apheresis for an exchange transfusion.    Interval History  Ricky is seen in apheresis today during his monthly exchange transfusion. He reports feeling well overall. He is currently in [place] for work and will be there for at least a few more weeks. He is not sure which state he will be transitioning to next. He denies any significant physical concerns. Pain is not typically an issue for him although he has been experiencing soreness around his ankles, particularly along his bilateral achilles tendons. This discomfort is mild and intermittent. He is on his feet often for work and notices this more when he's been standing for long period of time. He denies any focal warmth or swelling in the ankles or feet. Denies any calf pain or tightness. After moving to [place] he did develop viral cold symptoms which resolved within 1 week. The intermittent numbness and tingling that he was previously experiencing in his extremities has now resolved. He has recently received both a COVID booster and influenza vaccine through his employer. He has not yet caught up on his dental visits.    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 He was on monthly transfusions from 4037-5107 with chelation  and in 2013 he was switched to peripheral RBC exchange which has kept him in great health since then. He no longer needs chelation and is able to keep his HbS ~30% or below with regularity. He grew up in [place] and has spent most of his life in the [facility]. 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 He just got sent to [facility] and moved here one week ago. He denies regular pain and has not had a pain crisis since he was a teenager. He has kept up with his exchange transfusions and feels quite healthy overall. He regained the vision after having a macular infarct at age 10 and has no further neurologic symptoms since. He does not take any regular medications and has not had any opioids in many years either. He has no complaints today. He met with the apheresis team before this visit and is squared away to be maintained on his exchange schedule here.    ---------------------------------------  Ricky Pak's Goals (discussed 10/30/23)    1-3 month goal:      6 month goal:      12 month goal:  Possibly look for a place to settle down permanently     Disease-specific goal(s):  -Dental follow-up  -Maintain good exchange regimens despite moving about the country. Plans to continue to return to [place] monthly.  ---------------------------------------      Sickle Cell Disease Comprehensive Checklist  Bone Health/Avascular Necrosis Screening/Symptoms (each visit):  none  Leg Ulcer evaluation (every visit): none  Hypertension (every visit): None  Last ophthalmologic exam: August 2022  Last urinalysis for microalbuminuria/CKD (annually): Needs at next visit  Last pulmonary evaluation (asthma, WILFREDO, pulm HTN): unknown, no current concerns  Stroke/silent cerebral infarct Hx (Y/N): macular infarct ~2006, vision now normalized, due for adult eye  referral  Last PCP Visit: No established PCP  Vaccines:   Reported up to date. Received COVID booster and flu shot (2022-23) through employer.    Plan last reviewed with patient: 1/24/2023    Patient background: 26 yo male who recently moved from [place] to [place] in late 2020. 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 No children or significant others    Sickle Cell Disease History  Primary Hematologist: Janiya  Genotype: SS  Acute Pain Crisis Treatment: (Opioid-naive)  ER/Acute Care/Infusion Clinic:   Morphine 1 mg IVP/SC Q1H X 3 doses  Toradol 30 mg IV x1  LR 1 L  Other: Zofran 8 mg IV  Inpatient:  Opioid: Morphine 1 mg IV Q1H PRN until PCA starts  Toradol 30 mg q6h x 3-4 days  PCA plan:  PCA button dose: Morphine 1 mg  Lockout: 20 minutes  Continuous Infusion: consider 0.5 mg/hr  Other Medications: Robaxin PRN, Zofran  Supportive Care: Docusate, Senna  Chronic Pain Medications:  none  Baseline Hemoglobin: 12 mg/dl  Hydroxyurea use: no  H/O blood transfusions: Yes (partial exchange since 2013, full transfusion protocol 2006-13, now fully chelated)  H/O Transfusion Reactions: no  Antibodies: none known  H/O Acute Chest Syndrome: none  Last episode: n/a  ICU/intubation: no  H/O Stroke: Yes (macular infarct ~2006, vision normalized)  H/O VTE: no  H/O Cholecystectomy or Splenectomy: Cholecystectomy (2011)  H/O Asthma, Pulm HTN, AVN, Leg Ulcers, Nephropathy, Retinopathy, etc: none      Review Of Systems:   ROS: 10 point ROS neg other than the symptoms noted above in the HPI.    Past Medical History:   Past Medical History:   Diagnosis Date    Gallstones 2011    s/p cholecystectomy    Hb-SS disease without crisis (H)     History of CVA (cerebrovascular accident) 07/2006    macular infarct, but regained vision    Iron overload due to repeated red blood cell transfusions 2013    s/p chelation    Sleep apnea     Treated with CPAP.       Past Surgical History:  Past Surgical History:  "  Procedure Laterality Date    CHOLECYSTECTOMY N/A 2011    LIVER BIOPSY N/A 2007    iron overload monitoring    LIVER BIOPSY N/A 2008    monitoring for iron overload       Past Family History:   Family History   Problem Relation Age of Onset    Sickle Cell Trait Mother     Sickle Cell Trait Father     No Known Problems Sister     No Known Problems Sister     Sickle Cell Trait Brother     Breast Cancer Maternal Grandmother     Breast Cancer Maternal Great-Grandmother        Social History:   Social History     Socioeconomic History    Marital status: Single     Spouse name: Not on file    Number of children: Not on file    Years of education: Not on file    Highest education level: Not on file   Occupational History    Not on file   Tobacco Use    Smoking status: Never    Smokeless tobacco: Never   Substance and Sexual Activity    Alcohol use: Yes     Alcohol/week: 2.0 standard drinks of alcohol     Types: 2 Standard drinks or equivalent per week     Comment: \"A few beers every other weekend.\"    Drug use: Never    Sexual activity: Not on file   Other Topics Concern    Not on file   Social History Narrative    Recently moved to [place]. 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     Social Determinants of Health     Financial Resource Strain: Not on file   Food Insecurity: Not on file   Transportation Needs: Not on file   Physical Activity: Not on file   Stress: Not on file   Social Connections: Not on file   Interpersonal Safety: Not At Risk (9/12/2022)    Humiliation, Afraid, Rape, and Kick questionnaire     Fear of Current or Ex-Partner: No     Emotionally Abused: No     Physically Abused: No     Sexually Abused: No   Housing Stability: Not on file       Current Medications:    Current Outpatient Medications   Medication    acetaminophen-codeine (TYLENOL W/CODEINE #3) 300-30 MG per tablet    HYDROcodone-acetaminophen (NORCO) 5-325 MG tablet     No current facility-administered " medications for this visit.       Physical Exam:   10/30/2023  8:20 AM   Vital Signs    Systolic 120    Diastolic 78    Pulse 75    Temperature 98.1  F (36.7  C)    Respirations 16    Weight (LB) 231 lb 0.7 oz          General: Well-appearing male, NAD  Eyes: EOMI, PERRL. No scleral icterus.  Cardiovascular: RRR, no m/g/r. No peripheral edema.  Respiratory: CTA bilaterally. No wheezes or crackles.  Neurologic: Grossly nonfocal.   Skin: No rashes, petechiae, or bruising noted on exposed skin.    Labs:   Latest Reference Range & Units 10/27/23 12:25   WBC 4.0 - 11.0 10e3/uL 9.1   Hemoglobin 13.3 - 17.7 g/dL 10.8 (L)   Hematocrit 40.0 - 53.0 % 32.8 (L)   Platelet Count 150 - 450 10e3/uL 553 (H)   RBC Count 4.40 - 5.90 10e6/uL 4.38 (L)   MCV 78 - 100 fL 75 (L)   MCH 26.5 - 33.0 pg 24.7 (L)   MCHC 31.5 - 36.5 g/dL 32.9   RDW 10.0 - 15.0 % 24.1 (H)   % Neutrophils % 51   % Lymphocytes % 32   % Monocytes % 11   % Eosinophils % 5   % Basophils % 1   Absolute Basophils 0.0 - 0.2 10e3/uL 0.1   Absolute Eosinophils 0.0 - 0.7 10e3/uL 0.5   Absolute Immature Granulocytes <=0.4 10e3/uL 0.0   Absolute Lymphocytes 0.8 - 5.3 10e3/uL 2.9   Absolute Monocytes 0.0 - 1.3 10e3/uL 1.0   % Immature Granulocytes % 0   Absolute Neutrophils 1.6 - 8.3 10e3/uL 4.6   Absolute NRBCs 10e3/uL 0.1   NRBCs per 100 WBC <1 /100 1 (H)   % Retic 0.5 - 2.0 % 4.2 (H)   Absolute Retic 0.025 - 0.095 10e6/uL 0.189 (H)   (L): Data is abnormally low  (H): Data is abnormally high    Assessment:   Ricky Pak is a 27 year old male with HbSS Disease and a history of macular infarct who has elected to continue to receive sickle cell care in [place] routinely. He continues to travel for work every few months, currently residing in [place]. He remains on monthly exchange transfusions which he receives here in [place]. Denies current issues with exchange transfusions for vascular access.    He will continue to seek out dental evaluation. He is also due for  a repeat ophthalmology exam. Will again request adult eye referral and aim to coordinate this with one of his monthly exchanges.     The mild pain in his ankles/achilles is suspicious for tendinitis although symptoms are not currently limiting. I suspect this is related to spending sustained time on his feet at work. He will let me know if symptoms worsen or if he develops any persistent focal pain. I would be happy to see him back during one of his monthly exchange transfusions if needed for further evaluation.     Plan:  1) Labs: CBC, retic, type and screen at apheresis visit. UA and CMP on return.  2) Orders: Adult eye referral  3) Follow up: 4-6 months with Dr. Denson    35 minutes spent on the date of the encounter doing chart review, review of test results, patient visit, and documentation     Iris Roberts CNP            Again, thank you for allowing me to participate in the care of your patient.        Sincerely,        Iris Roberts CNP

## 2023-11-01 LAB
HGB S BLD QL: NORMAL
HGB S BLD QL: NORMAL

## 2023-11-10 ENCOUNTER — TELEPHONE (OUTPATIENT)
Dept: OPHTHALMOLOGY | Facility: CLINIC | Age: 28
End: 2023-11-10
Payer: COMMERCIAL

## 2023-11-10 NOTE — TELEPHONE ENCOUNTER
Called and spoke to Ricky García requested an appointment in [place]       Appointment information will be on my chart     A Former Dr. Meredith pt    Mayra Pardo Communication Facilitator on 11/10/2023 at 11:01 AM

## 2023-11-26 LAB
ABO/RH(D): NORMAL
ANTIBODY SCREEN: NEGATIVE
SPECIMEN EXPIRATION DATE: NORMAL

## 2023-11-27 ENCOUNTER — LAB (OUTPATIENT)
Dept: LAB | Facility: CLINIC | Age: 28
End: 2023-11-27
Attending: PEDIATRICS
Payer: COMMERCIAL

## 2023-11-27 DIAGNOSIS — D57.1 SICKLE CELL DISEASE WITHOUT CRISIS (H): ICD-10-CM

## 2023-11-27 DIAGNOSIS — D57.1 HB-SS DISEASE WITHOUT CRISIS (H): ICD-10-CM

## 2023-11-27 LAB
ALBUMIN SERPL BCG-MCNC: 4.6 G/DL (ref 3.5–5.2)
ALBUMIN UR-MCNC: 20 MG/DL
ALP SERPL-CCNC: 114 U/L (ref 40–150)
ALT SERPL W P-5'-P-CCNC: 60 U/L (ref 0–70)
ANION GAP SERPL CALCULATED.3IONS-SCNC: 8 MMOL/L (ref 7–15)
APPEARANCE UR: CLEAR
AST SERPL W P-5'-P-CCNC: 44 U/L (ref 0–45)
BASOPHILS # BLD AUTO: 0.1 10E3/UL (ref 0–0.2)
BASOPHILS NFR BLD AUTO: 1 %
BILIRUB SERPL-MCNC: 0.5 MG/DL
BILIRUB UR QL STRIP: NEGATIVE
BUN SERPL-MCNC: 9.6 MG/DL (ref 6–20)
CALCIUM SERPL-MCNC: 9.6 MG/DL (ref 8.6–10)
CHLORIDE SERPL-SCNC: 104 MMOL/L (ref 98–107)
COLOR UR AUTO: ABNORMAL
CREAT SERPL-MCNC: 0.79 MG/DL (ref 0.67–1.17)
DEPRECATED HCO3 PLAS-SCNC: 28 MMOL/L (ref 22–29)
EGFRCR SERPLBLD CKD-EPI 2021: >90 ML/MIN/1.73M2
EOSINOPHIL # BLD AUTO: 0.5 10E3/UL (ref 0–0.7)
EOSINOPHIL NFR BLD AUTO: 6 %
ERYTHROCYTE [DISTWIDTH] IN BLOOD BY AUTOMATED COUNT: 24 % (ref 10–15)
GLUCOSE SERPL-MCNC: 93 MG/DL (ref 70–99)
GLUCOSE UR STRIP-MCNC: NEGATIVE MG/DL
HCT VFR BLD AUTO: 34.8 % (ref 40–53)
HGB BLD-MCNC: 11.3 G/DL (ref 13.3–17.7)
HGB UR QL STRIP: NEGATIVE
IMM GRANULOCYTES # BLD: 0 10E3/UL
IMM GRANULOCYTES NFR BLD: 0 %
KETONES UR STRIP-MCNC: NEGATIVE MG/DL
LEUKOCYTE ESTERASE UR QL STRIP: NEGATIVE
LYMPHOCYTES # BLD AUTO: 2.8 10E3/UL (ref 0.8–5.3)
LYMPHOCYTES NFR BLD AUTO: 31 %
MCH RBC QN AUTO: 24.1 PG (ref 26.5–33)
MCHC RBC AUTO-ENTMCNC: 32.5 G/DL (ref 31.5–36.5)
MCV RBC AUTO: 74 FL (ref 78–100)
MONOCYTES # BLD AUTO: 1 10E3/UL (ref 0–1.3)
MONOCYTES NFR BLD AUTO: 10 %
MUCOUS THREADS #/AREA URNS LPF: PRESENT /LPF
NEUTROPHILS # BLD AUTO: 4.8 10E3/UL (ref 1.6–8.3)
NEUTROPHILS NFR BLD AUTO: 52 %
NITRATE UR QL: NEGATIVE
NRBC # BLD AUTO: 0.1 10E3/UL
NRBC BLD AUTO-RTO: 1 /100
PH UR STRIP: 6.5 [PH] (ref 5–7)
PLATELET # BLD AUTO: 652 10E3/UL (ref 150–450)
POTASSIUM SERPL-SCNC: 4 MMOL/L (ref 3.4–5.3)
PROT SERPL-MCNC: 8.3 G/DL (ref 6.4–8.3)
RBC # BLD AUTO: 4.69 10E6/UL (ref 4.4–5.9)
RBC URINE: 1 /HPF
RETICS # AUTO: 0.17 10E6/UL (ref 0.03–0.1)
RETICS/RBC NFR AUTO: 3.6 % (ref 0.5–2)
SODIUM SERPL-SCNC: 140 MMOL/L (ref 135–145)
SP GR UR STRIP: 1.02 (ref 1–1.03)
UROBILINOGEN UR STRIP-MCNC: NORMAL MG/DL
WBC # BLD AUTO: 9.2 10E3/UL (ref 4–11)
WBC URINE: 1 /HPF

## 2023-11-27 PROCEDURE — 36415 COLL VENOUS BLD VENIPUNCTURE: CPT

## 2023-11-27 PROCEDURE — 86850 RBC ANTIBODY SCREEN: CPT

## 2023-11-27 PROCEDURE — 85045 AUTOMATED RETICULOCYTE COUNT: CPT

## 2023-11-27 PROCEDURE — 81001 URINALYSIS AUTO W/SCOPE: CPT

## 2023-11-27 PROCEDURE — 80053 COMPREHEN METABOLIC PANEL: CPT

## 2023-11-27 PROCEDURE — 86901 BLOOD TYPING SEROLOGIC RH(D): CPT

## 2023-11-27 PROCEDURE — 86923 COMPATIBILITY TEST ELECTRIC: CPT

## 2023-11-27 PROCEDURE — 85025 COMPLETE CBC W/AUTO DIFF WBC: CPT

## 2023-11-28 RX ORDER — HYDROXYZINE HYDROCHLORIDE 25 MG/1
25 TABLET, FILM COATED ORAL ONCE
Status: CANCELLED | OUTPATIENT
Start: 2023-11-29

## 2023-11-29 ENCOUNTER — HOSPITAL ENCOUNTER (OUTPATIENT)
Dept: LAB | Facility: CLINIC | Age: 28
Discharge: HOME | End: 2023-11-29
Attending: PEDIATRICS | Admitting: PEDIATRICS
Payer: COMMERCIAL

## 2023-11-29 VITALS
DIASTOLIC BLOOD PRESSURE: 74 MMHG | TEMPERATURE: 97.7 F | WEIGHT: 236.99 LBS | BODY MASS INDEX: 31.93 KG/M2 | HEART RATE: 77 BPM | SYSTOLIC BLOOD PRESSURE: 115 MMHG | RESPIRATION RATE: 20 BRPM

## 2023-11-29 DIAGNOSIS — D57.1 HB-SS DISEASE WITHOUT CRISIS (H): ICD-10-CM

## 2023-11-29 LAB
HCT VFR BLD AUTO: 31.7 % (ref 40–53)
HCT VFR BLD AUTO: 32.6 % (ref 40–53)
HGB BLD-MCNC: 10.1 G/DL (ref 13.3–17.7)
HGB BLD-MCNC: 11.1 G/DL (ref 13.3–17.7)

## 2023-11-29 PROCEDURE — 36415 COLL VENOUS BLD VENIPUNCTURE: CPT | Performed by: STUDENT IN AN ORGANIZED HEALTH CARE EDUCATION/TRAINING PROGRAM

## 2023-11-29 PROCEDURE — 36512 APHERESIS RBC: CPT

## 2023-11-29 PROCEDURE — 250N000013 HC RX MED GY IP 250 OP 250 PS 637: Performed by: STUDENT IN AN ORGANIZED HEALTH CARE EDUCATION/TRAINING PROGRAM

## 2023-11-29 PROCEDURE — 85018 HEMOGLOBIN: CPT | Performed by: STUDENT IN AN ORGANIZED HEALTH CARE EDUCATION/TRAINING PROGRAM

## 2023-11-29 PROCEDURE — P9016 RBC LEUKOCYTES REDUCED: HCPCS

## 2023-11-29 PROCEDURE — 250N000009 HC RX 250: Performed by: PATHOLOGY

## 2023-11-29 PROCEDURE — 83021 HEMOGLOBIN CHROMOTOGRAPHY: CPT | Performed by: STUDENT IN AN ORGANIZED HEALTH CARE EDUCATION/TRAINING PROGRAM

## 2023-11-29 PROCEDURE — 250N000013 HC RX MED GY IP 250 OP 250 PS 637: Performed by: PATHOLOGY

## 2023-11-29 RX ORDER — ASPIRIN 325 MG
325 TABLET ORAL DAILY
Status: DISCONTINUED | OUTPATIENT
Start: 2023-11-29 | End: 2023-11-29

## 2023-11-29 RX ORDER — HYDROXYZINE HYDROCHLORIDE 25 MG/1
25 TABLET, FILM COATED ORAL ONCE
Status: COMPLETED | OUTPATIENT
Start: 2023-11-29 | End: 2023-11-29

## 2023-11-29 RX ADMIN — ASPIRIN 325 MG ORAL TABLET 325 MG: 325 PILL ORAL at 08:24

## 2023-11-29 RX ADMIN — HYDROXYZINE HYDROCHLORIDE 25 MG: 25 TABLET, FILM COATED ORAL at 08:24

## 2023-11-29 RX ADMIN — ANTICOAGULANT CITRATE DEXTROSE SOLUTION FORMULA A 493 ML: 12.25; 11; 3.65 SOLUTION INTRAVENOUS at 08:55

## 2023-11-29 NOTE — PROGRESS NOTES
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 He is tolerating today's procedure well, resting through much of it.  No concerns/complaints.  Continue with plan for monthly prophylactic procedures.            History of Present Illness   Ricky Pak is a 28 year old male with hemoglobin SS disease and a history of remote macular infarct (2006,  vision normalized) who has a history of chronic transfusions. He was on monthly transfusions from 9519-0322 with chelation and in 2013 he was switched to peripheral RBC exchange which has kept him in good health . He no longer needs chelation and is able to keep his HbS ~30% or below with regularity. He grew up in [place] and has spent most of his life in the [facility]. 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          Past Surgical History:     Past Surgical History:   Procedure Laterality Date     CHOLECYSTECTOMY N/A 2011     LIVER BIOPSY N/A 2007    iron overload monitoring     LIVER BIOPSY N/A 2008    monitoring  "for iron overload          Social History:     Social History     Tobacco Use     Smoking status: Never     Smokeless tobacco: Never   Substance Use Topics     Alcohol use: Yes     Alcohol/week: 2.0 standard drinks of alcohol     Types: 2 Standard drinks or equivalent per week     Comment: \"A few beers every other weekend.\"            Allergies:     Allergies   Allergen Reactions     Diphenhydramine Hives          Medications:     Current Outpatient Medications   Medication Sig     acetaminophen-codeine (TYLENOL W/CODEINE #3) 300-30 MG per tablet Take 1-2 tablets by mouth as needed     HYDROcodone-acetaminophen (NORCO) 5-325 MG tablet Take 1-2 tablets by mouth as needed     Current Facility-Administered Medications   Medication     aspirin (ASA) tablet 325 mg           Abbreviated Physical Exam:     Vitals:    11/29/23 0937 11/29/23 0959 11/29/23 1022 11/29/23 1055   BP: (!) 154/99 (!) 155/72 136/69 115/74   Pulse: 65 67 67 77   Resp:    20   Temp:    97.7  F (36.5  C)   TempSrc:    Oral   Weight:           Patient was resting comfortably, breathing unlabored.         Laboratory Data:   BMP  Recent Labs   Lab 11/27/23  1233      POTASSIUM 4.0   CHLORIDE 104   BALBINA 9.6   CO2 28   BUN 9.6   CR 0.79   GLC 93       CBC  Recent Labs   Lab 11/29/23  1040 11/29/23  0855 11/27/23  1233   WBC  --   --  9.2   RBC  --   --  4.69   HGB 11.1* 10.1* 11.3*   HCT 32.6* 31.7* 34.8*   MCV  --   --  74*   MCH  --   --  24.1*   MCHC  --   --  32.5   RDW  --   --  24.0*   PLT  --   --  652*            Procedure Summary:   A RBC exchange was performed with 10 PRBC units .  Peripheral veins were used for access. ACD-A was for anticoagulation.   The patient's vital signs were stable throughout the exchange, and he tolerated the procedure well.    Attestation:   During the procedure the patient was directly seen and evaluated by me, Spencer Mejia M.D..  Spencer Mejia M.D.  Professor, Transfusion Medicine  Laboratory Medicine & " Pathology  Pager: [phone]

## 2023-11-29 NOTE — DISCHARGE INSTRUCTIONS
Apheresis Blood Donor Center Post Instructions  You may feel tired after your procedure today.   Please call your doctor if you have:  bleeding that doesn t stop, fever, pain where a needle or tube (catheter) was placed, seizures, trouble breathing, red urine, nausea or vomiting, other health concerns.     If your symptoms are severe, call [phone].  If your veins were used, keep the bandages on for 4 hours.  Avoid heavy lifting with your arms.  If bleeding occurs from these sites, apply firm pressure for 5-10 minutes.  Call your physician if bleeding continues.    If you get a bruise:  1)  Apply ice to the area intermittently for 10-15 minutes during the first 24 hours.  2)  Thereafter, apply intermittent warm moist heat for 10-15 minutes to the area.  3)  A rainbow of colors may occur for about 10 days.    If you get a bruise larger than 2-3 inches in diameter, redness, swelling, or pain where the needle was, or tingling in your fingers or arm, contact the Apheresis/Blood Donor Center @ [phone].    The Apheresis/Blood Donor Center is open Monday-Friday 7:30 a.m. to 5 p.m.  The phone number is [phone].  A Transfusion Medicine physician can be reached after 5:00 p.m. weekdays and on weekends /Holidays by calling [phone], and asking for the physician on call.      Red blood cell exchange:   If you received blood products (plasma or red blood cells) as part of your treatment, you need to be aware that transfusion reactions can occur up to several hours after they have been given to you.  Call your physician if you experience any symptoms in the next 48 hours, including breathing problems, rash, itching, hives, nausea or vomiting, fever or chills, blood in your urine or stools, or joint pain.  Please inform the Transfusion Medicine Physician by calling [phone] and asking for the physician on call.

## 2023-11-30 LAB — HGB S BLD QL: NORMAL

## 2023-12-01 LAB — HGB S BLD QL: NORMAL

## 2024-03-10 LAB
ABO/RH(D): NORMAL
ANTIBODY SCREEN: NEGATIVE
SPECIMEN EXPIRATION DATE: NORMAL

## 2024-03-11 ENCOUNTER — LAB (OUTPATIENT)
Dept: LAB | Facility: CLINIC | Age: 29
End: 2024-03-11
Attending: PEDIATRICS
Payer: COMMERCIAL

## 2024-03-11 DIAGNOSIS — Z86.73 HISTORY OF CVA (CEREBROVASCULAR ACCIDENT): Primary | ICD-10-CM

## 2024-03-11 DIAGNOSIS — D57.1 HB-SS DISEASE WITHOUT CRISIS (H): ICD-10-CM

## 2024-03-11 DIAGNOSIS — Z86.73 HISTORY OF CVA (CEREBROVASCULAR ACCIDENT): ICD-10-CM

## 2024-03-11 LAB
BASOPHILS # BLD AUTO: 0.2 10E3/UL (ref 0–0.2)
BASOPHILS NFR BLD AUTO: 1 %
EOSINOPHIL # BLD AUTO: 1.4 10E3/UL (ref 0–0.7)
EOSINOPHIL NFR BLD AUTO: 10 %
ERYTHROCYTE [DISTWIDTH] IN BLOOD BY AUTOMATED COUNT: 24.2 % (ref 10–15)
HCT VFR BLD AUTO: 29.7 % (ref 40–53)
HGB BLD-MCNC: 10.1 G/DL (ref 13.3–17.7)
IMM GRANULOCYTES # BLD: 0.1 10E3/UL
IMM GRANULOCYTES NFR BLD: 1 %
LYMPHOCYTES # BLD AUTO: 4.1 10E3/UL (ref 0.8–5.3)
LYMPHOCYTES NFR BLD AUTO: 28 %
MCH RBC QN AUTO: 27.4 PG (ref 26.5–33)
MCHC RBC AUTO-ENTMCNC: 34 G/DL (ref 31.5–36.5)
MCV RBC AUTO: 81 FL (ref 78–100)
MONOCYTES # BLD AUTO: 1.6 10E3/UL (ref 0–1.3)
MONOCYTES NFR BLD AUTO: 11 %
NEUTROPHILS # BLD AUTO: 7.5 10E3/UL (ref 1.6–8.3)
NEUTROPHILS NFR BLD AUTO: 49 %
NRBC # BLD AUTO: 0.2 10E3/UL
NRBC BLD AUTO-RTO: 2 /100
PLATELET # BLD AUTO: 591 10E3/UL (ref 150–450)
RBC # BLD AUTO: 3.68 10E6/UL (ref 4.4–5.9)
WBC # BLD AUTO: 14.9 10E3/UL (ref 4–11)

## 2024-03-11 PROCEDURE — 86900 BLOOD TYPING SEROLOGIC ABO: CPT

## 2024-03-11 PROCEDURE — 85025 COMPLETE CBC W/AUTO DIFF WBC: CPT

## 2024-03-11 PROCEDURE — 36415 COLL VENOUS BLD VENIPUNCTURE: CPT

## 2024-03-11 PROCEDURE — 86923 COMPATIBILITY TEST ELECTRIC: CPT

## 2024-03-11 NOTE — NURSING NOTE
Chief Complaint   Patient presents with    Labs Only     Labs drawn via  by RN in lab      Labs collected from venipuncture by RN.   Barbi Sahu RN

## 2024-03-12 RX ORDER — ASPIRIN 325 MG
325 TABLET ORAL ONCE
Status: CANCELLED | OUTPATIENT
Start: 2024-03-13

## 2024-03-12 RX ORDER — HYDROXYZINE HYDROCHLORIDE 25 MG/1
25 TABLET, FILM COATED ORAL ONCE
Status: CANCELLED | OUTPATIENT
Start: 2024-03-13

## 2024-03-13 ENCOUNTER — HOSPITAL ENCOUNTER (OUTPATIENT)
Dept: LAB | Facility: CLINIC | Age: 29
Discharge: HOME | End: 2024-03-13
Attending: PEDIATRICS | Admitting: PEDIATRICS
Payer: COMMERCIAL

## 2024-03-13 VITALS
TEMPERATURE: 98.1 F | HEART RATE: 84 BPM | RESPIRATION RATE: 16 BRPM | BODY MASS INDEX: 30.53 KG/M2 | DIASTOLIC BLOOD PRESSURE: 78 MMHG | SYSTOLIC BLOOD PRESSURE: 158 MMHG | WEIGHT: 226.63 LBS

## 2024-03-13 LAB
HCT VFR BLD AUTO: 25.9 % (ref 40–53)
HCT VFR BLD AUTO: 27.7 % (ref 40–53)
HGB BLD-MCNC: 8.7 G/DL (ref 13.3–17.7)
HGB BLD-MCNC: 9.6 G/DL (ref 13.3–17.7)

## 2024-03-13 PROCEDURE — 250N000013 HC RX MED GY IP 250 OP 250 PS 637: Performed by: STUDENT IN AN ORGANIZED HEALTH CARE EDUCATION/TRAINING PROGRAM

## 2024-03-13 PROCEDURE — 85014 HEMATOCRIT: CPT | Performed by: STUDENT IN AN ORGANIZED HEALTH CARE EDUCATION/TRAINING PROGRAM

## 2024-03-13 PROCEDURE — 36415 COLL VENOUS BLD VENIPUNCTURE: CPT | Performed by: STUDENT IN AN ORGANIZED HEALTH CARE EDUCATION/TRAINING PROGRAM

## 2024-03-13 PROCEDURE — 250N000009 HC RX 250: Performed by: STUDENT IN AN ORGANIZED HEALTH CARE EDUCATION/TRAINING PROGRAM

## 2024-03-13 PROCEDURE — P9016 RBC LEUKOCYTES REDUCED: HCPCS

## 2024-03-13 PROCEDURE — 83020 HEMOGLOBIN ELECTROPHORESIS: CPT | Performed by: STUDENT IN AN ORGANIZED HEALTH CARE EDUCATION/TRAINING PROGRAM

## 2024-03-13 PROCEDURE — 36512 APHERESIS RBC: CPT

## 2024-03-13 RX ORDER — ASPIRIN 325 MG
325 TABLET ORAL ONCE
Status: COMPLETED | OUTPATIENT
Start: 2024-03-13 | End: 2024-03-13

## 2024-03-13 RX ORDER — HYDROXYZINE HYDROCHLORIDE 25 MG/1
25 TABLET, FILM COATED ORAL ONCE
Status: COMPLETED | OUTPATIENT
Start: 2024-03-13 | End: 2024-03-13

## 2024-03-13 RX ADMIN — ANTICOAGULANT CITRATE DEXTROSE SOLUTION FORMULA A 631 ML: 12.25; 11; 3.65 SOLUTION INTRAVENOUS at 08:55

## 2024-03-13 RX ADMIN — HYDROXYZINE HYDROCHLORIDE 25 MG: 25 TABLET, FILM COATED ORAL at 08:19

## 2024-03-13 RX ADMIN — ASPIRIN 325 MG ORAL TABLET 325 MG: 325 PILL ORAL at 08:20

## 2024-03-13 NOTE — PROCEDURES
Transfusion Medicine  Apheresis Procedure Note    Ricky Pak [id number]   YOB: 1995 Age: 28 year old         Procedure:  Red blood cell exchange (RBCX)           Assessment and Plan:     Ricky Pak is a 28 year old male with sickle cell disease (SCD) who underwent maintenance RBC exchange. Peripheral veins were used for access and he tolerated the procedure well.  No concerns or complaints were reported when asked how he has been.  Review of the chart indicates that he did have some leg pain last month, he had been in a different state and had not had an exchange with us for at least a few months.  He feels well today.  Denies nausea, vomiting, fevers, chills, chest pain, shortness of breath.   His SCD remains under good control with regular RBC exchange and the plan is to continue ~monthly RBC exchanges.                   History of Present Illness:   Ricky Pak is a 28 year old male with a past medical history include gallstones (now post cholecystectomy), and SCD complicated by a CVA with macular infarct and iron overload secondary to repeated blood transfusions. He began apheresis-based RBC exchanges around 2013 following his issues with iron overload. These exchanges have done a good job preventing sickle cell crises.  When he is in the area, he will come and receive red blood cell exchanges here at the [facility].                   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             Past Surgical History:     Past Surgical History:   Procedure Laterality Date    CHOLECYSTECTOMY N/A 2011    LIVER BIOPSY N/A 2007    iron overload monitoring    LIVER BIOPSY  N/A 2008    monitoring for iron overload             Allergies:     Allergies   Allergen Reactions    Diphenhydramine Hives             Medications:     Current Outpatient Medications   Medication Sig    acetaminophen-codeine (TYLENOL W/CODEINE #3) 300-30 MG per tablet Take 1-2 tablets by mouth as needed    HYDROcodone-acetaminophen (NORCO) 5-325 MG tablet Take 1-2 tablets by mouth as needed     No current facility-administered medications for this encounter.             Review of Systems:     See above           Exam:   BP (!) 158/78   Pulse 84   Temp 98.1  F (36.7  C) (Oral)   Resp 16   Wt 102.8 kg (226 lb 10.1 oz)   BMI 30.53 kg/m    Alert, no apparent distress  Breathing appears comfortable on room air  Peripheral IV access for the procedure.             Data:     Results for orders placed or performed during the hospital encounter of 03/13/24 (from the past 24 hour(s))   Hemoglobin and hematocrit   Result Value Ref Range    Hemoglobin 8.7 (L) 13.3 - 17.7 g/dL    Hematocrit 25.9 (L) 40.0 - 53.0 %   Hemoglobin and hematocrit   Result Value Ref Range    Hemoglobin 9.6 (L) 13.3 - 17.7 g/dL    Hematocrit 27.7 (L) 40.0 - 53.0 %       CBC  Recent Labs   Lab 03/13/24  1110 03/13/24  0850 03/11/24  1140   WBC  --   --  14.9*   RBC  --   --  3.68*   HGB 9.6* 8.7* 10.1*   HCT 27.7* 25.9* 29.7*   MCV  --   --  81   MCH  --   --  27.4   MCHC  --   --  34.0   RDW  --   --  24.2*   PLT  --   --  591*               Procedure Summary:   A red blood cell exchange was performed using donor red blood cells as the replacement product.    Peripheral veins were used for access and allowed for appropriate flow during the procedure.  ACD-A was used for anticoagulation.   The patient's vital signs were stable throughout.  The patient tolerated the procedure well without complication.  See apheresis flowsheet for additional details.        Attestation: During the procedure the patient was directly seen and evaluated by Maikel henry  Spencer Del Toro MD.    Maikel Del Toro MD  Transfusion Medicine Attending  Laboratory Medicine & Pathology

## 2024-03-13 NOTE — DISCHARGE INSTRUCTIONS
Red blood cell exchange:   If you received blood products (plasma or red blood cells) as part of your treatment, you need to be aware that transfusion reactions can occur up to several hours after they have been given to you.  Call your physician if you experience any symptoms in the next 48 hours, including breathing problems, rash, itching, hives, nausea or vomiting, fever or chills, blood in your urine or stools, or joint pain.  Please inform the Transfusion Medicine Physician by calling [phone] and asking for the physician on call.  Apheresis Blood Donor Center Post Instructions  You may feel tired after your procedure today.   Please call your doctor if you have:  bleeding that doesn t stop, fever, pain where a needle or tube (catheter) was placed, seizures, trouble breathing, red urine, nausea or vomiting, other health concerns.     If your symptoms are severe, call [phone].  If your veins were used, keep the bandages on for 2-4 hours.  Avoid heavy lifting with your arms.  If bleeding occurs from these sites, apply firm pressure for 5-10 minutes.  Call your physician if bleeding continues.    The Apheresis/Blood Donor Center is open Monday-Friday 7:30 a.m. to 5 p.m.  The phone number is [phone].  A Transfusion Medicine physician can be reached after 5:00 p.m. weekdays and on weekends /Holidays by calling [phone], and asking for the physician on call.

## 2024-03-15 LAB
HGB S BLD QL: POSITIVE
HGB S BLD QL: POSITIVE
HGB S MFR BLD ELPH: 15.6 %
HGB S MFR BLD ELPH: 76.6 %

## 2024-06-04 LAB
ABO/RH(D): NORMAL
ANTIBODY SCREEN: NEGATIVE
SPECIMEN EXPIRATION DATE: NORMAL

## 2024-06-05 ENCOUNTER — LAB (OUTPATIENT)
Dept: LAB | Facility: CLINIC | Age: 29
End: 2024-06-05
Attending: PEDIATRICS
Payer: COMMERCIAL

## 2024-06-05 DIAGNOSIS — Z86.73 HISTORY OF CVA (CEREBROVASCULAR ACCIDENT): ICD-10-CM

## 2024-06-05 DIAGNOSIS — D57.1 HB-SS DISEASE WITHOUT CRISIS (H): ICD-10-CM

## 2024-06-05 LAB
ALBUMIN SERPL BCG-MCNC: 4.4 G/DL (ref 3.5–5.2)
ALP SERPL-CCNC: 126 U/L (ref 40–150)
ALT SERPL W P-5'-P-CCNC: 71 U/L (ref 0–70)
ANION GAP SERPL CALCULATED.3IONS-SCNC: 11 MMOL/L (ref 7–15)
AST SERPL W P-5'-P-CCNC: 81 U/L (ref 0–45)
BASOPHILS # BLD AUTO: ABNORMAL 10*3/UL
BASOPHILS # BLD MANUAL: 0.1 10E3/UL (ref 0–0.2)
BASOPHILS NFR BLD AUTO: ABNORMAL %
BASOPHILS NFR BLD MANUAL: 1 %
BILIRUB SERPL-MCNC: 2.7 MG/DL
BUN SERPL-MCNC: 8.3 MG/DL (ref 6–20)
CALCIUM SERPL-MCNC: 9.1 MG/DL (ref 8.6–10)
CHLORIDE SERPL-SCNC: 105 MMOL/L (ref 98–107)
CREAT SERPL-MCNC: 0.69 MG/DL (ref 0.67–1.17)
DEPRECATED HCO3 PLAS-SCNC: 22 MMOL/L (ref 22–29)
EGFRCR SERPLBLD CKD-EPI 2021: >90 ML/MIN/1.73M2
EOSINOPHIL # BLD AUTO: ABNORMAL 10*3/UL
EOSINOPHIL # BLD MANUAL: 2 10E3/UL (ref 0–0.7)
EOSINOPHIL NFR BLD AUTO: ABNORMAL %
EOSINOPHIL NFR BLD MANUAL: 18 %
ERYTHROCYTE [DISTWIDTH] IN BLOOD BY AUTOMATED COUNT: 22.4 % (ref 10–15)
GLUCOSE SERPL-MCNC: 112 MG/DL (ref 70–99)
HCT VFR BLD AUTO: 27.9 % (ref 40–53)
HGB BLD-MCNC: 9.9 G/DL (ref 13.3–17.7)
IMM GRANULOCYTES # BLD: ABNORMAL 10*3/UL
IMM GRANULOCYTES NFR BLD: ABNORMAL %
LYMPHOCYTES # BLD AUTO: ABNORMAL 10*3/UL
LYMPHOCYTES # BLD MANUAL: 2.9 10E3/UL (ref 0.8–5.3)
LYMPHOCYTES NFR BLD AUTO: ABNORMAL %
LYMPHOCYTES NFR BLD MANUAL: 27 %
MCH RBC QN AUTO: 30 PG (ref 26.5–33)
MCHC RBC AUTO-ENTMCNC: 35.5 G/DL (ref 31.5–36.5)
MCV RBC AUTO: 85 FL (ref 78–100)
METAMYELOCYTES # BLD MANUAL: 0.1 10E3/UL
METAMYELOCYTES NFR BLD MANUAL: 1 %
MONOCYTES # BLD AUTO: ABNORMAL 10*3/UL
MONOCYTES # BLD MANUAL: 1.4 10E3/UL (ref 0–1.3)
MONOCYTES NFR BLD AUTO: ABNORMAL %
MONOCYTES NFR BLD MANUAL: 13 %
MYELOCYTES # BLD MANUAL: 0.1 10E3/UL
MYELOCYTES NFR BLD MANUAL: 1 %
NEUTROPHILS # BLD AUTO: ABNORMAL 10*3/UL
NEUTROPHILS # BLD MANUAL: 4.3 10E3/UL (ref 1.6–8.3)
NEUTROPHILS NFR BLD AUTO: ABNORMAL %
NEUTROPHILS NFR BLD MANUAL: 39 %
NRBC # BLD AUTO: 0.3 10E3/UL
NRBC # BLD AUTO: 0.5 10E3/UL
NRBC BLD AUTO-RTO: 3 /100
NRBC BLD MANUAL-RTO: 5 %
PLAT MORPH BLD: ABNORMAL
PLATELET # BLD AUTO: 354 10E3/UL (ref 150–450)
POTASSIUM SERPL-SCNC: 4.1 MMOL/L (ref 3.4–5.3)
PROT SERPL-MCNC: 7.7 G/DL (ref 6.4–8.3)
RBC # BLD AUTO: 3.3 10E6/UL (ref 4.4–5.9)
RBC MORPH BLD: ABNORMAL
RETICS # AUTO: 0.42 10E6/UL (ref 0.03–0.1)
RETICS/RBC NFR AUTO: 13.5 % (ref 0.5–2)
SICKLE CELLS BLD QL SMEAR: ABNORMAL
SODIUM SERPL-SCNC: 138 MMOL/L (ref 135–145)
TARGETS BLD QL SMEAR: ABNORMAL
WBC # BLD AUTO: 10.9 10E3/UL (ref 4–11)

## 2024-06-05 PROCEDURE — 86923 COMPATIBILITY TEST ELECTRIC: CPT

## 2024-06-05 PROCEDURE — 36415 COLL VENOUS BLD VENIPUNCTURE: CPT

## 2024-06-05 PROCEDURE — 86900 BLOOD TYPING SEROLOGIC ABO: CPT

## 2024-06-05 PROCEDURE — 80053 COMPREHEN METABOLIC PANEL: CPT

## 2024-06-05 PROCEDURE — 85007 BL SMEAR W/DIFF WBC COUNT: CPT

## 2024-06-05 PROCEDURE — 85045 AUTOMATED RETICULOCYTE COUNT: CPT

## 2024-06-05 PROCEDURE — 85027 COMPLETE CBC AUTOMATED: CPT

## 2024-06-06 RX ORDER — HYDROXYZINE HYDROCHLORIDE 25 MG/1
25 TABLET, FILM COATED ORAL ONCE
Status: CANCELLED | OUTPATIENT
Start: 2024-06-06

## 2024-06-06 RX ORDER — ASPIRIN 325 MG
325 TABLET ORAL ONCE
Status: CANCELLED | OUTPATIENT
Start: 2024-06-06

## 2024-06-07 ENCOUNTER — HOSPITAL ENCOUNTER (OUTPATIENT)
Dept: LAB | Facility: CLINIC | Age: 29
Discharge: HOME | End: 2024-06-07
Attending: PEDIATRICS | Admitting: PEDIATRICS
Payer: COMMERCIAL

## 2024-06-07 VITALS
HEIGHT: 72 IN | SYSTOLIC BLOOD PRESSURE: 159 MMHG | RESPIRATION RATE: 18 BRPM | BODY MASS INDEX: 32.31 KG/M2 | WEIGHT: 238.54 LBS | DIASTOLIC BLOOD PRESSURE: 75 MMHG | HEART RATE: 77 BPM | TEMPERATURE: 97.9 F

## 2024-06-07 LAB
HCT VFR BLD AUTO: 26.8 % (ref 40–53)
HCT VFR BLD AUTO: 27.2 % (ref 40–53)
HGB BLD-MCNC: 9.2 G/DL (ref 13.3–17.7)
HGB BLD-MCNC: 9.5 G/DL (ref 13.3–17.7)

## 2024-06-07 PROCEDURE — 250N000013 HC RX MED GY IP 250 OP 250 PS 637: Performed by: STUDENT IN AN ORGANIZED HEALTH CARE EDUCATION/TRAINING PROGRAM

## 2024-06-07 PROCEDURE — 83020 HEMOGLOBIN ELECTROPHORESIS: CPT | Performed by: STUDENT IN AN ORGANIZED HEALTH CARE EDUCATION/TRAINING PROGRAM

## 2024-06-07 PROCEDURE — 36415 COLL VENOUS BLD VENIPUNCTURE: CPT | Performed by: STUDENT IN AN ORGANIZED HEALTH CARE EDUCATION/TRAINING PROGRAM

## 2024-06-07 PROCEDURE — P9016 RBC LEUKOCYTES REDUCED: HCPCS

## 2024-06-07 PROCEDURE — 36512 APHERESIS RBC: CPT

## 2024-06-07 PROCEDURE — 85018 HEMOGLOBIN: CPT | Performed by: STUDENT IN AN ORGANIZED HEALTH CARE EDUCATION/TRAINING PROGRAM

## 2024-06-07 PROCEDURE — 250N000009 HC RX 250: Performed by: STUDENT IN AN ORGANIZED HEALTH CARE EDUCATION/TRAINING PROGRAM

## 2024-06-07 RX ORDER — ASPIRIN 325 MG
325 TABLET ORAL ONCE
Status: COMPLETED | OUTPATIENT
Start: 2024-06-07 | End: 2024-06-07

## 2024-06-07 RX ORDER — HYDROXYZINE HYDROCHLORIDE 25 MG/1
25 TABLET, FILM COATED ORAL ONCE
Status: COMPLETED | OUTPATIENT
Start: 2024-06-07 | End: 2024-06-07

## 2024-06-07 RX ADMIN — HYDROXYZINE HYDROCHLORIDE 25 MG: 25 TABLET, FILM COATED ORAL at 07:49

## 2024-06-07 RX ADMIN — ANTICOAGULANT CITRATE DEXTROSE SOLUTION FORMULA A 617 ML: 12.25; 11; 3.65 SOLUTION INTRAVENOUS at 11:55

## 2024-06-07 RX ADMIN — ASPIRIN 325 MG ORAL TABLET 325 MG: 325 PILL ORAL at 07:49

## 2024-06-07 NOTE — DISCHARGE INSTRUCTIONS
Red blood cell exchange:   If you received blood products (plasma or red blood cells) as part of your treatment, you need to be aware that transfusion reactions can occur up to several hours after they have been given to you.  Call your physician if you experience any symptoms in the next 48 hours, including breathing problems, rash, itching, hives, nausea or vomiting, fever or chills, blood in your urine or stools, or joint pain.  Please inform the Transfusion Medicine Physician by calling [phone] and asking for the physician on call.

## 2024-06-07 NOTE — PROCEDURES
Transfusion Medicine  Apheresis Procedure Note    Ricky Pak [id number]   YOB: 1995 Age: 28 year old         Procedure:  Red blood cell exchange (RBCX)           Assessment and Plan:   Ricky Pak is a 28 year old male with sickle cell disease (SCD) who underwent maintenance RBC exchange. Peripheral veins were used for access and he tolerated the procedure well. No complaints raised today, resting for much of the procedure.  Due to some flow issues we did need to re-place one needle for the draw line during the procedure, concern for some clotting in the needle.      His SCD remains under good control with regular RBC exchange and the plan is to continue ~monthly RBC exchanges.               History of Present Illness:   Ricky Pak is a 28 year old male with a past medical history include gallstones (now post cholecystectomy), and SCD complicated by a CVA with macular infarct and iron overload secondary to repeated blood transfusions. He began apheresis-based RBC exchanges around 2013 following his issues with iron overload. These exchanges have done a good job preventing sickle cell crises.  When he is in the area, he will come and receive red blood cell exchanges here at the [facility].              Past Medical History:      Past Medical History:   Diagnosis Date    Gallstones 2011    s/p cholecystectomy    Hb-SS disease without crisis (H)     History of CVA (cerebrovascular accident) 07/2006    macular infarct, but regained vision    Iron overload due to repeated red blood cell transfusions 2013    s/p chelation    Sleep apnea     Treated with CPAP.               Past Surgical History:     Past Surgical History:   Procedure Laterality Date    CHOLECYSTECTOMY N/A 2011    LIVER BIOPSY N/A 2007    iron overload monitoring    LIVER BIOPSY N/A 2008    monitoring for iron overload             Allergies:     Allergies   Allergen Reactions    Diphenhydramine Hives              "Medications:     Current Outpatient Medications   Medication Sig Dispense Refill    acetaminophen-codeine (TYLENOL W/CODEINE #3) 300-30 MG per tablet Take 1-2 tablets by mouth as needed      HYDROcodone-acetaminophen (NORCO) 5-325 MG tablet Take 1-2 tablets by mouth as needed       Current Facility-Administered Medications   Medication Dose Route Frequency Provider Last Rate Last Admin    Anticoagulant Citrate Dextrose Formula A   (Apheresis Center)   Apheresis During Apheresis (from stock) Adilene Ferraro MD                 Review of Systems:     See above           Exam:   BP (!) 143/75   Pulse 69   Temp 97.9  F (36.6  C) (Temporal)   Resp 18   Ht 1.835 m (6' 0.24\")   Wt 108.2 kg (238 lb 8.6 oz)   BMI 32.13 kg/m    Resting comfortably, no apparent distress  Breathing appears comfortable on room air  Peripheral IV access for the procedure.             Data:     Results for orders placed or performed during the hospital encounter of 06/07/24 (from the past 24 hour(s))   Hemoglobin and hematocrit   Result Value Ref Range    Hemoglobin 9.5 (L) 13.3 - 17.7 g/dL    Hematocrit 27.2 (L) 40.0 - 53.0 %       BMP  Recent Labs   Lab 06/05/24  1205      POTASSIUM 4.1   CHLORIDE 105   BALBINA 9.1   CO2 22   BUN 8.3   CR 0.69   *     CBC  Recent Labs   Lab 06/07/24  0800 06/05/24  1205   WBC  --  10.9   RBC  --  3.30*   HGB 9.5* 9.9*   HCT 27.2* 27.9*   MCV  --  85   MCH  --  30.0   MCHC  --  35.5   RDW  --  22.4*   PLT  --  354               Procedure Summary:   A red blood cell exchange was performed using donor red blood cells as the replacement product.    Peripheral veins were used for access and allowed for appropriate flow during the procedure.  ACD-A was used for anticoagulation.   The patient's vital signs were stable throughout.  The patient tolerated the procedure well without complication.  See apheresis flowsheet for additional details.        Attestation: During the procedure the patient was directly " seen and evaluated by me, Maikel Del Toro MD.  The medical student, Aiden Peña, was also present for the evaluation.  I have reviewed the chart and pertinent laboratory findings, and discussed the patient and the current procedure with the Apheresis nursing staff.    Maikel Del Toro MD  Transfusion Medicine Attending  Laboratory Medicine and Pathology

## 2024-06-11 LAB
HGB S BLD QL: ABNORMAL
HGB S BLD QL: ABNORMAL
HGB S MFR BLD ELPH: 17.1 %
HGB S MFR BLD ELPH: 71.4 %

## 2024-06-20 ENCOUNTER — PATIENT OUTREACH (OUTPATIENT)
Dept: ONCOLOGY | Facility: CLINIC | Age: 29
End: 2024-06-20
Payer: COMMERCIAL

## 2024-06-20 NOTE — PROGRESS NOTES
[facility]: Cancer Care Follow-Up Note                                    Discussion with Patient:                                                      Pallavi sent with a check in message and advised to schedule appt for follow-up. Completed chart audit to update Oncology Care Coordination enrollment status.  Reviewed POC.        Goals          General    Pain Management     Notes - Note created  6/20/2024 12:35 PM by Paula Viera RN    Goal Statement: I will establish a plan for preventing and managing pain.  Date Goal set: 6/20/2024  Barriers: disease burden  Strengths: coping, motivation, health awareness, and involvement with care team  Date to Achieve By: ongoing  Patient expressed understanding of goal: Yes  Action steps to achieve this goal:  I will take medications as prescribed.   I will call triage with new or worsening pain not controlled by medication.   I will use alternative therapies as directed. (Ice, heat, massage, etc)   I will call triage for medication refills when there are 2-3 days of medication remaining.                 Dates of Treatment:                                                      Infusion given in last 28 days       None            Assessment:                                                      Plan of Care Education Review:   Assessment completed with:: Patient    Plan of Care Education   Diagnosis:: sickle cell disease  Does patient understand diagnosis?: Yes  Tx plan/regimen:: apheresis  Does patient understand treatment plan/regimen?: Yes  Plan of Care:: Lab appointment;MD follow-up appointment;Treatment schedule;ANAND follow-up appointment    Evaluation of Learning  Patient Education Provided: Yes  Method:: Explanation           Intervention/Education provided during outreach:                                                         Patient to follow up as scheduled at next appt  Patient to call/[employer]hart message with updates  Confirmed patient has clinic and triage  numbers    Signature:  Paula Viera RN

## 2024-06-30 LAB
ABO/RH(D): NORMAL
ANTIBODY SCREEN: NEGATIVE
SPECIMEN EXPIRATION DATE: NORMAL

## 2024-07-01 ENCOUNTER — ONCOLOGY VISIT (OUTPATIENT)
Dept: ONCOLOGY | Facility: CLINIC | Age: 29
End: 2024-07-01
Attending: PEDIATRICS
Payer: COMMERCIAL

## 2024-07-01 ENCOUNTER — APPOINTMENT (OUTPATIENT)
Dept: LAB | Facility: CLINIC | Age: 29
End: 2024-07-01
Attending: PEDIATRICS
Payer: COMMERCIAL

## 2024-07-01 VITALS
SYSTOLIC BLOOD PRESSURE: 127 MMHG | DIASTOLIC BLOOD PRESSURE: 81 MMHG | BODY MASS INDEX: 31.14 KG/M2 | RESPIRATION RATE: 16 BRPM | WEIGHT: 231.2 LBS | TEMPERATURE: 98.3 F | OXYGEN SATURATION: 98 % | HEART RATE: 80 BPM

## 2024-07-01 DIAGNOSIS — D57.1 HB-SS DISEASE WITHOUT CRISIS (H): Primary | ICD-10-CM

## 2024-07-01 DIAGNOSIS — Z92.89 HISTORY OF EXCHANGE TRANSFUSION: ICD-10-CM

## 2024-07-01 DIAGNOSIS — Z86.73 HISTORY OF CVA (CEREBROVASCULAR ACCIDENT): ICD-10-CM

## 2024-07-01 LAB
ALBUMIN SERPL BCG-MCNC: 4.6 G/DL (ref 3.5–5.2)
ALBUMIN UR-MCNC: 10 MG/DL
ALP SERPL-CCNC: 107 U/L (ref 40–150)
ALT SERPL W P-5'-P-CCNC: 45 U/L (ref 0–70)
ANION GAP SERPL CALCULATED.3IONS-SCNC: 11 MMOL/L (ref 7–15)
APPEARANCE UR: CLEAR
AST SERPL W P-5'-P-CCNC: 48 U/L (ref 0–45)
BASOPHILS # BLD AUTO: ABNORMAL 10*3/UL
BASOPHILS # BLD MANUAL: 0 10E3/UL (ref 0–0.2)
BASOPHILS NFR BLD AUTO: ABNORMAL %
BASOPHILS NFR BLD MANUAL: 0 %
BILIRUB SERPL-MCNC: 1.4 MG/DL
BILIRUB UR QL STRIP: NEGATIVE
BUN SERPL-MCNC: 12.2 MG/DL (ref 6–20)
CALCIUM SERPL-MCNC: 9.2 MG/DL (ref 8.6–10)
CHLORIDE SERPL-SCNC: 105 MMOL/L (ref 98–107)
COLOR UR AUTO: YELLOW
CREAT SERPL-MCNC: 0.72 MG/DL (ref 0.67–1.17)
DEPRECATED HCO3 PLAS-SCNC: 22 MMOL/L (ref 22–29)
EGFRCR SERPLBLD CKD-EPI 2021: >90 ML/MIN/1.73M2
EOSINOPHIL # BLD AUTO: ABNORMAL 10*3/UL
EOSINOPHIL # BLD MANUAL: 1 10E3/UL (ref 0–0.7)
EOSINOPHIL NFR BLD AUTO: ABNORMAL %
EOSINOPHIL NFR BLD MANUAL: 11 %
ERYTHROCYTE [DISTWIDTH] IN BLOOD BY AUTOMATED COUNT: 18.5 % (ref 10–15)
FERRITIN SERPL-MCNC: 74 NG/ML (ref 31–409)
GLUCOSE SERPL-MCNC: 93 MG/DL (ref 70–99)
GLUCOSE UR STRIP-MCNC: NEGATIVE MG/DL
HCT VFR BLD AUTO: 33.5 % (ref 40–53)
HGB BLD-MCNC: 11.2 G/DL (ref 13.3–17.7)
HGB UR QL STRIP: NEGATIVE
IMM GRANULOCYTES # BLD: ABNORMAL 10*3/UL
IMM GRANULOCYTES NFR BLD: ABNORMAL %
KETONES UR STRIP-MCNC: NEGATIVE MG/DL
LEUKOCYTE ESTERASE UR QL STRIP: NEGATIVE
LYMPHOCYTES # BLD AUTO: ABNORMAL 10*3/UL
LYMPHOCYTES # BLD MANUAL: 1.8 10E3/UL (ref 0.8–5.3)
LYMPHOCYTES NFR BLD AUTO: ABNORMAL %
LYMPHOCYTES NFR BLD MANUAL: 20 %
MCH RBC QN AUTO: 27.8 PG (ref 26.5–33)
MCHC RBC AUTO-ENTMCNC: 33.4 G/DL (ref 31.5–36.5)
MCV RBC AUTO: 83 FL (ref 78–100)
METAMYELOCYTES # BLD MANUAL: 0.1 10E3/UL
METAMYELOCYTES NFR BLD MANUAL: 1 %
MONOCYTES # BLD AUTO: ABNORMAL 10*3/UL
MONOCYTES # BLD MANUAL: 1 10E3/UL (ref 0–1.3)
MONOCYTES NFR BLD AUTO: ABNORMAL %
MONOCYTES NFR BLD MANUAL: 11 %
MUCOUS THREADS #/AREA URNS LPF: PRESENT /LPF
NEUTROPHILS # BLD AUTO: ABNORMAL 10*3/UL
NEUTROPHILS # BLD MANUAL: 5.1 10E3/UL (ref 1.6–8.3)
NEUTROPHILS NFR BLD AUTO: ABNORMAL %
NEUTROPHILS NFR BLD MANUAL: 57 %
NITRATE UR QL: NEGATIVE
NRBC # BLD AUTO: 0 10E3/UL
NRBC # BLD AUTO: 0.2 10E3/UL
NRBC BLD AUTO-RTO: 0 /100
NRBC BLD MANUAL-RTO: 2 %
PH UR STRIP: 5.5 [PH] (ref 5–7)
PLAT MORPH BLD: ABNORMAL
PLATELET # BLD AUTO: 521 10E3/UL (ref 150–450)
POLYCHROMASIA BLD QL SMEAR: SLIGHT
POTASSIUM SERPL-SCNC: 3.8 MMOL/L (ref 3.4–5.3)
PROT SERPL-MCNC: 8 G/DL (ref 6.4–8.3)
RBC # BLD AUTO: 4.03 10E6/UL (ref 4.4–5.9)
RBC MORPH BLD: ABNORMAL
RBC URINE: 1 /HPF
SODIUM SERPL-SCNC: 138 MMOL/L (ref 135–145)
SP GR UR STRIP: 1.02 (ref 1–1.03)
SQUAMOUS EPITHELIAL: <1 /HPF
TARGETS BLD QL SMEAR: SLIGHT
UROBILINOGEN UR STRIP-MCNC: NORMAL MG/DL
WBC # BLD AUTO: 9 10E3/UL (ref 4–11)
WBC URINE: 1 /HPF

## 2024-07-01 PROCEDURE — 250N000021 HC RX MED A9270 GY (STAT IND- M) 250: Performed by: PEDIATRICS

## 2024-07-01 PROCEDURE — 86900 BLOOD TYPING SEROLOGIC ABO: CPT | Performed by: PEDIATRICS

## 2024-07-01 PROCEDURE — 81001 URINALYSIS AUTO W/SCOPE: CPT | Performed by: PEDIATRICS

## 2024-07-01 PROCEDURE — 90734 MENACWYD/MENACWYCRM VACC IM: CPT | Performed by: PEDIATRICS

## 2024-07-01 PROCEDURE — 82728 ASSAY OF FERRITIN: CPT | Performed by: PEDIATRICS

## 2024-07-01 PROCEDURE — 85007 BL SMEAR W/DIFF WBC COUNT: CPT | Performed by: PEDIATRICS

## 2024-07-01 PROCEDURE — 84075 ASSAY ALKALINE PHOSPHATASE: CPT | Performed by: PEDIATRICS

## 2024-07-01 PROCEDURE — G2211 COMPLEX E/M VISIT ADD ON: HCPCS | Performed by: PEDIATRICS

## 2024-07-01 PROCEDURE — 85027 COMPLETE CBC AUTOMATED: CPT | Performed by: PEDIATRICS

## 2024-07-01 PROCEDURE — 90471 IMMUNIZATION ADMIN: CPT | Performed by: PEDIATRICS

## 2024-07-01 PROCEDURE — 99215 OFFICE O/P EST HI 40 MIN: CPT | Performed by: PEDIATRICS

## 2024-07-01 PROCEDURE — 99213 OFFICE O/P EST LOW 20 MIN: CPT | Mod: 25 | Performed by: PEDIATRICS

## 2024-07-01 PROCEDURE — 36415 COLL VENOUS BLD VENIPUNCTURE: CPT | Performed by: PEDIATRICS

## 2024-07-01 RX ORDER — EPINEPHRINE 1 MG/ML
0.3 INJECTION, SOLUTION INTRAMUSCULAR; SUBCUTANEOUS EVERY 5 MIN PRN
OUTPATIENT
Start: 2024-07-29

## 2024-07-01 RX ORDER — EPINEPHRINE 1 MG/ML
0.3 INJECTION, SOLUTION INTRAMUSCULAR; SUBCUTANEOUS EVERY 5 MIN PRN
Status: CANCELLED | OUTPATIENT
Start: 2024-07-01

## 2024-07-01 RX ORDER — ALBUTEROL SULFATE 90 UG/1
1-2 AEROSOL, METERED RESPIRATORY (INHALATION)
Start: 2024-07-29

## 2024-07-01 RX ORDER — ALBUTEROL SULFATE 0.83 MG/ML
2.5 SOLUTION RESPIRATORY (INHALATION)
OUTPATIENT
Start: 2024-07-29

## 2024-07-01 RX ORDER — METHYLPREDNISOLONE SODIUM SUCCINATE 125 MG/2ML
125 INJECTION, POWDER, LYOPHILIZED, FOR SOLUTION INTRAMUSCULAR; INTRAVENOUS
Status: CANCELLED
Start: 2024-07-01

## 2024-07-01 RX ORDER — METHYLPREDNISOLONE SODIUM SUCCINATE 125 MG/2ML
125 INJECTION, POWDER, LYOPHILIZED, FOR SOLUTION INTRAMUSCULAR; INTRAVENOUS
Start: 2024-07-29

## 2024-07-01 RX ORDER — DIPHENHYDRAMINE HYDROCHLORIDE 50 MG/ML
50 INJECTION INTRAMUSCULAR; INTRAVENOUS
Status: CANCELLED
Start: 2024-07-01

## 2024-07-01 RX ORDER — DIPHENHYDRAMINE HYDROCHLORIDE 50 MG/ML
50 INJECTION INTRAMUSCULAR; INTRAVENOUS
Start: 2024-07-29

## 2024-07-01 RX ORDER — MEPERIDINE HYDROCHLORIDE 25 MG/ML
25 INJECTION INTRAMUSCULAR; INTRAVENOUS; SUBCUTANEOUS EVERY 30 MIN PRN
Status: CANCELLED | OUTPATIENT
Start: 2024-07-01

## 2024-07-01 RX ORDER — ALBUTEROL SULFATE 90 UG/1
1-2 AEROSOL, METERED RESPIRATORY (INHALATION)
Status: CANCELLED
Start: 2024-07-01

## 2024-07-01 RX ORDER — MEPERIDINE HYDROCHLORIDE 25 MG/ML
25 INJECTION INTRAMUSCULAR; INTRAVENOUS; SUBCUTANEOUS EVERY 30 MIN PRN
OUTPATIENT
Start: 2024-07-29

## 2024-07-01 RX ORDER — ALBUTEROL SULFATE 0.83 MG/ML
2.5 SOLUTION RESPIRATORY (INHALATION)
Status: CANCELLED | OUTPATIENT
Start: 2024-07-01

## 2024-07-01 RX ADMIN — MENINGOCOCCAL (GROUPS A, C, Y AND W-135) OLIGOSACCHARIDE DIPHTHERIA CRM197 CONJUGATE VACCINE 0.5 ML: KIT at 12:37

## 2024-07-01 ASSESSMENT — PAIN SCALES - GENERAL: PAINLEVEL: NO PAIN (0)

## 2024-07-01 NOTE — NURSING NOTE
"Oncology Rooming Note    July 1, 2024 11:23 AM   Ricky Pak is a 28 year old male who presents for:    Chief Complaint   Patient presents with    Blood Draw     Labs drawn with  by rn.  VS taken.    Oncology Clinic Visit     Sickle Cell Anemia     Initial Vitals: /81 (BP Location: Right arm, Patient Position: Sitting, Cuff Size: Adult Large)   Pulse 80   Temp 98.3  F (36.8  C) (Oral)   Resp 16   Wt 104.9 kg (231 lb 3.2 oz)   SpO2 98%   BMI 31.14 kg/m   Estimated body mass index is 31.14 kg/m  as calculated from the following:    Height as of 6/7/24: 1.835 m (6' 0.24\").    Weight as of this encounter: 104.9 kg (231 lb 3.2 oz). Body surface area is 2.31 meters squared.  No Pain (0) Comment: Data Unavailable   No LMP for male patient.  Allergies reviewed: Yes  Medications reviewed: Yes    Medications: Medication refills not needed today.  Pharmacy name entered into [facility]: [place], [place] - [address] [facility] [id number]    Frailty Screening:   Is the patient here for a new oncology consult visit in cancer care? 2. No      Clinical concerns:  Patient states there are no new concerns to discuss with provider.  Dr Denson was not notified.        Emilie Hughes CMA              "

## 2024-07-01 NOTE — NURSING NOTE
Chief Complaint   Patient presents with    Blood Draw     Labs drawn with  by rn.  VS taken.     Labs drawn with  by rn.  Pt tolerated well.  VS taken.  Pt checked in for next appt.    Sheila Ferrer RN

## 2024-07-01 NOTE — NURSING NOTE
Clinic Nursing Note:    Patient received meningococcal oligosaccharide vaccine in left deltoid as ordered by provider. Patient tolerated without incident.      Megan Lau RN

## 2024-07-01 NOTE — LETTER
"7/1/2024      Ricky Pak  [address] [address]      Dear Colleague,    Thank you for referring your patient, Ricky Pak, to the [facility]. Please see a copy of my visit note below.    Adult Sickle Cell Outpatient Clinic Note        Date of Visit: 07/01/2024      Ricky Pak is a 28 year old male who is being seen as a follow up for SCD care. He has chosen to continue care in our system although his work schedule requires he travel every few months.  He is seen today for routine clinic follow-up. His apheresis visit is in 4 days.    Interval History  Ricky is being seen in regular clinic today. He got back to [place] 2-3 months ago after spending about 5 months in [place], [place]. He did have an admission while down there which did not go well. It was a week long but during the first 48 hours, the staff wanted to see if he could \"wait it out\" and gave very limited analgesics. It was until about 2 days in where they did an opioid pain plan (not clear whether they used our plan or not).    He is back here and doing well and is looking to settle here long-term. No recent pain issues. He does feel like he has had some blurriness in his eyes that has perhaps gotten worse. He missed his last ophthalmology appointment and needs to reschedule. He was back in [place] this past week, flying in just this AM, and it was good to be home.     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 He was on monthly transfusions from 8538-3601 with chelation and in 2013 he was switched to peripheral RBC exchange which has kept him in great health since then. He no longer needs chelation and is able to keep his HbS ~30% or below with regularity. He grew up in [place] and has spent most of his life in the [facility]. 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 He just got sent to [facility] and moved here one week ago. He denies regular pain and has not had a pain crisis since he was a teenager. He has kept up with his exchange transfusions and feels quite healthy overall. He regained the vision after having a macular infarct at age 10 and has no further neurologic symptoms since. He does not take any regular medications and has not had any opioids in many years either. He has no complaints today. He met with the apheresis team before this visit and is squared away to be maintained on his exchange schedule here.    ---------------------------------------  Ricky Pak's Goals (discussed 7/1/24)    1-3 month goal:      6 month goal:      12 month goal:  Possibly look for a place to settle down permanently     Disease-specific goal(s):  -Dental follow-up  -Maintain good exchange regimens despite moving about the country. Plans to continue to return to [place] monthly if out of the state  -Needs ophthalmology follow up  ---------------------------------------      Sickle Cell Disease Comprehensive Checklist  Bone Health/Avascular Necrosis Screening/Symptoms (each visit):  none  Leg Ulcer evaluation (every visit): none  Hypertension (every visit): None  Last ophthalmologic exam: August 2022  Last urinalysis for microalbuminuria/CKD (annually): 7/1/24, microalbumin still present but lower  Last pulmonary evaluation (asthma, WILFREDO, pulm HTN): unknown, no current concerns  Stroke/silent cerebral infarct Hx (Y/N): macular infarct ~2006, vision now normalized, due for adult eye referral  Last PCP Visit: No established PCP  Vaccines:   MenACWY given 7/1/24. Needs MenB booster. PPSV23 in 2025.    Plan last reviewed with patient: 7/1/24    Patient background: 27 yo male originally from  [place] who moved to [place] in late 2020. 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 No children or significant others    Sickle Cell Disease History  Primary Hematologist: Janiya  Genotype: SS  Acute Pain Crisis Treatment: (Opioid-naive)  ER/Acute Care/Infusion Clinic:   Morphine 1 mg IVP/SC Q1H X 3 doses  Toradol 30 mg IV x1  LR 1 L  Other: Zofran 8 mg IV  Inpatient:  Opioid: Morphine 1 mg IV Q1H PRN until PCA starts  Toradol 30 mg q6h x 3-4 days  PCA plan:  PCA button dose: Morphine 1 mg  Lockout: 20 minutes  Continuous Infusion: consider 0.5 mg/hr  Other Medications: Robaxin PRN, Zofran  Supportive Care: Docusate, Senna  Chronic Pain Medications:  none  Baseline Hemoglobin: 12 mg/dl  Hydroxyurea use: no  H/O blood transfusions: Yes (partial exchange since 2013, full transfusion protocol 2006-13, now fully chelated)  H/O Transfusion Reactions: no  Antibodies: none known  H/O Acute Chest Syndrome: none  Last episode: n/a  ICU/intubation: no  H/O Stroke: Yes (macular infarct ~2006, vision normalized)  H/O VTE: no  H/O Cholecystectomy or Splenectomy: Cholecystectomy (2011)  H/O Asthma, Pulm HTN, AVN, Leg Ulcers, Nephropathy, Retinopathy, etc: none      Review Of Systems:   ROS: 10 point ROS neg other than the symptoms noted above in the HPI.    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       Past Surgical History:  Past Surgical History:   Procedure Laterality Date     CHOLECYSTECTOMY N/A 2011     LIVER BIOPSY N/A 2007    iron overload monitoring     LIVER BIOPSY N/A 2008    monitoring for iron overload       Past Family History:   Family History   Problem Relation  "Age of Onset     Sickle Cell Trait Mother      Sickle Cell Trait Father      No Known Problems Sister      No Known Problems Sister      Sickle Cell Trait Brother      Breast Cancer Maternal Grandmother      Breast Cancer Maternal Great-Grandmother        Social History:   Social History     Socioeconomic History     Marital status: Single     Spouse name: Not on file     Number of children: Not on file     Years of education: Not on file     Highest education level: Not on file   Occupational History     Not on file   Tobacco Use     Smoking status: Never     Passive exposure: Never     Smokeless tobacco: Never   Substance and Sexual Activity     Alcohol use: Yes     Alcohol/week: 2.0 standard drinks of alcohol     Types: 2 Standard drinks or equivalent per week     Comment: \"A few beers every other weekend.\"     Drug use: Never     Sexual activity: Not on file   Other Topics Concern     Not on file   Social History Narrative    Moved to [place] in 2020. Originally from [place]. 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     Social Determinants of Health     Financial Resource Strain: Low Risk  (2/28/2024)    Received from [facility]    Overall Financial Resource Strain (CARDIA)      Difficulty of Paying Living Expenses: Not very hard   Food Insecurity: No Food Insecurity (2/20/2024)    Received from [facility]    Hunger Vital Sign      Worried About Running Out of Food in the Last Year: Never true      Ran Out of Food in the Last Year: Never true   Transportation Needs: No Transportation Needs (2/20/2024)    Received from [facility]    PRAPARE - Transportation      Lack of Transportation (Medical): No      Lack of Transportation (Non-Medical): No   Physical Activity: Sufficiently Active (2/28/2024)    Received from [facility]    Exercise Vital Sign      Days of Exercise per Week: 2 days      Minutes of " Exercise per Session: 120 min   Stress: No Stress Concern Present (2/28/2024)    [facility]    [employer] [place] [employer] - Occupational Stress Questionnaire      Feeling of Stress : Not at all   Social Connections: Unknown (2/28/2024)    [facility]    Social Connection and Isolation Panel [NHANES]      Frequency of Communication with Friends and Family: Twice a week      Frequency of Social Gatherings with Friends and Family: Not on file      Attends Confucianism Services: Never      Active Member of Clubs or Organizations: No      Attends Club or Organization Meetings: Never      Marital Status: Never    Interpersonal Safety: Not At Risk (2/20/2024)    Received from  [facility]    Humiliation, Afraid, Rape, and Kick questionnaire      Fear of Current or Ex-Partner: No      Emotionally Abused: No      Physically Abused: No      Sexually Abused: No   Housing Stability: Unknown (2/20/2024)    [facility]    Housing Stability Vital Sign      Unable to Pay for Housing in the Last Year: No      Number of Places Lived in the Last Year: Not on file      In the last 12 months, was there a time when you did not have a steady place to sleep or slept in a shelter (including now)?: No       Current Medications:    Current Outpatient Medications   Medication Sig Dispense Refill     acetaminophen-codeine (TYLENOL W/CODEINE #3) 300-30 MG per tablet Take 1-2 tablets by mouth as needed (Patient not taking: Reported on 7/1/2024)       HYDROcodone-acetaminophen (NORCO) 5-325 MG tablet Take 1-2 tablets by mouth as needed (Patient not taking: Reported on 7/1/2024)       No current facility-administered medications for this visit.       Physical Exam:  /81 (BP Location: Right arm, Patient Position: Sitting, Cuff Size: Adult Large)   Pulse 80   Temp 98.3  F (36.8  C) (Oral)   Resp 16   Wt 104.9 kg  (231 lb 3.2 oz)   SpO2 98%   BMI 31.14 kg/m      General: Well-appearing male, NAD  Eyes: EOMI, PERRL. No scleral icterus.  Cardiovascular: RRR, no m/g/r. No peripheral edema.  Respiratory: CTA bilaterally. No wheezes or crackles.  Neurologic: Grossly nonfocal.   Skin: No rashes, petechiae, or bruising noted on exposed skin.    Labs:  Recent Results (from the past 24 hour(s))   Routine UA with microscopic - No culture    Collection Time: 07/01/24 11:05 AM   Result Value Ref Range    Color Urine Yellow Colorless, Straw, Light Yellow, Yellow    Appearance Urine Clear Clear    Glucose Urine Negative Negative mg/dL    Bilirubin Urine Negative Negative    Ketones Urine Negative Negative mg/dL    Specific Gravity Urine 1.016 1.003 - 1.035    Blood Urine Negative Negative    pH Urine 5.5 5.0 - 7.0    Protein Albumin Urine 10 (A) Negative mg/dL    Urobilinogen Urine Normal Normal, 2.0 mg/dL    Nitrite Urine Negative Negative    Leukocyte Esterase Urine Negative Negative    Mucus Urine Present (A) None Seen /LPF    RBC Urine 1 <=2 /HPF    WBC Urine 1 <=5 /HPF    Squamous Epithelials Urine <1 <=1 /HPF   Comprehensive metabolic panel    Collection Time: 07/01/24 11:05 AM   Result Value Ref Range    Sodium 138 135 - 145 mmol/L    Potassium 3.8 3.4 - 5.3 mmol/L    Carbon Dioxide (CO2) 22 22 - 29 mmol/L    Anion Gap 11 7 - 15 mmol/L    Urea Nitrogen 12.2 6.0 - 20.0 mg/dL    Creatinine 0.72 0.67 - 1.17 mg/dL    GFR Estimate >90 >60 mL/min/1.73m2    Calcium 9.2 8.6 - 10.0 mg/dL    Chloride 105 98 - 107 mmol/L    Glucose 93 70 - 99 mg/dL    Alkaline Phosphatase 107 40 - 150 U/L    AST 48 (H) 0 - 45 U/L    ALT 45 0 - 70 U/L    Protein Total 8.0 6.4 - 8.3 g/dL    Albumin 4.6 3.5 - 5.2 g/dL    Bilirubin Total 1.4 (H) <=1.2 mg/dL   Ferritin    Collection Time: 07/01/24 11:05 AM   Result Value Ref Range    Ferritin 74 31 - 409 ng/mL   Adult Type and Screen    Collection Time: 07/01/24 11:05 AM   Result Value Ref Range    ABO/RH(D) O  POS     Antibody Screen Negative Negative    SPECIMEN EXPIRATION DATE [id number]    CBC with platelets and differential    Collection Time: 07/01/24 11:05 AM   Result Value Ref Range    WBC Count 9.0 4.0 - 11.0 10e3/uL    RBC Count 4.03 (L) 4.40 - 5.90 10e6/uL    Hemoglobin 11.2 (L) 13.3 - 17.7 g/dL    Hematocrit 33.5 (L) 40.0 - 53.0 %    MCV 83 78 - 100 fL    MCH 27.8 26.5 - 33.0 pg    MCHC 33.4 31.5 - 36.5 g/dL    RDW 18.5 (H) 10.0 - 15.0 %    Platelet Count 521 (H) 150 - 450 10e3/uL    % Neutrophils      % Lymphocytes      % Monocytes      % Eosinophils      % Basophils      % Immature Granulocytes      NRBCs per 100 WBC 0 <1 /100    Absolute Neutrophils      Absolute Lymphocytes      Absolute Monocytes      Absolute Eosinophils      Absolute Basophils      Absolute Immature Granulocytes      Absolute NRBCs 0.0 10e3/uL   Manual Differential    Collection Time: 07/01/24 11:05 AM   Result Value Ref Range    % Neutrophils 57 %    % Lymphocytes 20 %    % Monocytes 11 %    % Eosinophils 11 %    % Basophils 0 %    % Metamyelocytes 1 %    NRBCs per 100 WBC 2 (H) <=0 %    Absolute Neutrophils 5.1 1.6 - 8.3 10e3/uL    Absolute Lymphocytes 1.8 0.8 - 5.3 10e3/uL    Absolute Monocytes 1.0 0.0 - 1.3 10e3/uL    Absolute Eosinophils 1.0 (H) 0.0 - 0.7 10e3/uL    Absolute Basophils 0.0 0.0 - 0.2 10e3/uL    Absolute Metamyelocytes 0.1 (H) <=0.0 10e3/uL    Absolute NRBCs 0.2 (H) <=0.0 10e3/uL    RBC Morphology Confirmed RBC Indices     Platelet Assessment  Automated Count Confirmed. Platelet morphology is normal.     Automated Count Confirmed. Platelet morphology is normal.    Polychromasia Slight (A) None Seen    Target Cells Slight (A) None Seen         Assessment:   Ricky Pak is a 28 year old male with HbSS Disease and a history of macular infarct who has elected to continue to receive sickle cell care in [place] routinely. He continues to travel for work every few months, last in [place] in winter 2024, but back in [place] and  thinking of settling in more permanently. He remains on monthly exchange transfusions which he receives here in [place]. Denies current issues with exchange transfusions for vascular access.    HbSS  -continue exchange transfusions (next one later this week)    History of macular infarct  -Exchange transfusions per stroke protocol (DOES NOT HAVE PORT--STILL DOES PERIPHERAL ACCESS)  -Ferritin checked today and was within normal limits  -Needs repeat eye exam (some blurriness reported)    Slight proteinuria  -Now seen on repeat UA, though low level  -Continue monitoring UA each visit for now. Low threshold to start lisinopril, with consideration of nephrology consult down the line.    Healthcare maintenance  He will continue to seek out dental evaluation.   -MenACWY given today. Needs MenB at next visit    Tendinitis  The mild pain in his ankles/achilles in the past is gone, so it seems like it was tendinitis. No further evaluation right now      Daily Plan:  1) Labs: CBC, retic, ferritin. Usually has type and screen prior to apheresis visit. UA and CMP on return.  2) Orders: Adult ophtho re-contacted  3) Follow up: October 2024    45 minutes spent on the date of the encounter doing chart review, review of test results, patient visit, and documentation     Manny Denson MD  Classical Hematologist  Division of Hematology, Oncology, and Transplantation  [facility]  [facility]  Pager: [phone]    The longitudinal plan of care for the diagnosis(es)/condition(s) as documented were addressed during this visit. Due to the added complexity in care, I will continue to support Ricky in the subsequent management and with ongoing continuity of care.        Again, thank you for allowing me to participate in the care of your patient.        Sincerely,        Manny Denson MD

## 2024-07-01 NOTE — PROGRESS NOTES
"Adult Sickle Cell Outpatient Clinic Note        Date of Visit: 07/01/2024      Ricky Pak is a 28 year old male who is being seen as a follow up for SCD care. He has chosen to continue care in our system although his work schedule requires he travel every few months.  He is seen today for routine clinic follow-up. His apheresis visit is in 4 days.    Interval History  Ricky is being seen in regular clinic today. He got back to [place] 2-3 months ago after spending about 5 months in [place], [place]. He did have an admission while down there which did not go well. It was a week long but during the first 48 hours, the staff wanted to see if he could \"wait it out\" and gave very limited analgesics. It was until about 2 days in where they did an opioid pain plan (not clear whether they used our plan or not).    He is back here and doing well and is looking to settle here long-term. No recent pain issues. He does feel like he has had some blurriness in his eyes that has perhaps gotten worse. He missed his last ophthalmology appointment and needs to reschedule. He was back in [place] this past week, flying in just this AM, and it was good to be home.     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 He was on monthly transfusions from [id number] with chelation and in 2013 he was switched to peripheral RBC exchange which has kept him in great health since then. He no longer needs chelation and is able to keep his HbS ~30% or below with regularity. He grew up in [place] and has spent most of his life in the [facility]. 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 He just got sent to [facility] and moved here one week " ago. He denies regular pain and has not had a pain crisis since he was a teenager. He has kept up with his exchange transfusions and feels quite healthy overall. He regained the vision after having a macular infarct at age 10 and has no further neurologic symptoms since. He does not take any regular medications and has not had any opioids in many years either. He has no complaints today. He met with the apheresis team before this visit and is squared away to be maintained on his exchange schedule here.    ---------------------------------------  Ricky Pak's Goals (discussed 7/1/24)    1-3 month goal:      6 month goal:      12 month goal:  Possibly look for a place to settle down permanently     Disease-specific goal(s):  -Dental follow-up  -Maintain good exchange regimens despite moving about the country. Plans to continue to return to [place] monthly if out of the state  -Needs ophthalmology follow up  ---------------------------------------      Sickle Cell Disease Comprehensive Checklist  Bone Health/Avascular Necrosis Screening/Symptoms (each visit):  none  Leg Ulcer evaluation (every visit): none  Hypertension (every visit): None  Last ophthalmologic exam: August 2022  Last urinalysis for microalbuminuria/CKD (annually): 7/1/24, microalbumin still present but lower  Last pulmonary evaluation (asthma, WILFREDO, pulm HTN): unknown, no current concerns  Stroke/silent cerebral infarct Hx (Y/N): macular infarct ~2006, vision now normalized, due for adult eye referral  Last PCP Visit: No established PCP  Vaccines:   MenACWY given 7/1/24. Needs MenB booster. PPSV23 in 2025.    Plan last reviewed with patient: 7/1/24    Patient background: 29 yo male originally from [place], [place] who moved to [place] in late 2020. 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 No children or significant others    Sickle Cell Disease History  Primary Hematologist: Janiya  Genotype: SS  Acute Pain Crisis  Treatment: (Opioid-naive)  ER/Acute Care/Infusion Clinic:   Morphine 1 mg IVP/SC Q1H X 3 doses  Toradol 30 mg IV x1  LR 1 L  Other: Zofran 8 mg IV  Inpatient:  Opioid: Morphine 1 mg IV Q1H PRN until PCA starts  Toradol 30 mg q6h x 3-4 days  PCA plan:  PCA button dose: Morphine 1 mg  Lockout: 20 minutes  Continuous Infusion: consider 0.5 mg/hr  Other Medications: Robaxin PRN, Zofran  Supportive Care: Docusate, Senna  Chronic Pain Medications:  none  Baseline Hemoglobin: 12 mg/dl  Hydroxyurea use: no  H/O blood transfusions: Yes (partial exchange since 2013, full transfusion protocol 2006-13, now fully chelated)  H/O Transfusion Reactions: no  Antibodies: none known  H/O Acute Chest Syndrome: none  Last episode: n/a  ICU/intubation: no  H/O Stroke: Yes (macular infarct ~2006, vision normalized)  H/O VTE: no  H/O Cholecystectomy or Splenectomy: Cholecystectomy (2011)  H/O Asthma, Pulm HTN, AVN, Leg Ulcers, Nephropathy, Retinopathy, etc: none      Review Of Systems:   ROS: 10 point ROS neg other than the symptoms noted above in the HPI.    Past Medical History:   Past Medical History:   Diagnosis Date    Gallstones 2011    s/p cholecystectomy    Hb-SS disease without crisis (H)     History of CVA (cerebrovascular accident) 07/2006    macular infarct, but regained vision    Iron overload due to repeated red blood cell transfusions 2013    s/p chelation    Sleep apnea     Treated with CPAP.       Past Surgical History:  Past Surgical History:   Procedure Laterality Date    CHOLECYSTECTOMY N/A 2011    LIVER BIOPSY N/A 2007    iron overload monitoring    LIVER BIOPSY N/A 2008    monitoring for iron overload       Past Family History:   Family History   Problem Relation Age of Onset    Sickle Cell Trait Mother     Sickle Cell Trait Father     No Known Problems Sister     No Known Problems Sister     Sickle Cell Trait Brother     Breast Cancer Maternal Grandmother     Breast Cancer Maternal Great-Grandmother        Social  "History:   Social History     Socioeconomic History    Marital status: Single     Spouse name: Not on file    Number of children: Not on file    Years of education: Not on file    Highest education level: Not on file   Occupational History    Not on file   Tobacco Use    Smoking status: Never     Passive exposure: Never    Smokeless tobacco: Never   Substance and Sexual Activity    Alcohol use: Yes     Alcohol/week: 2.0 standard drinks of alcohol     Types: 2 Standard drinks or equivalent per week     Comment: \"A few beers every other weekend.\"    Drug use: Never    Sexual activity: Not on file   Other Topics Concern    Not on file   Social History Narrative    Moved to [place] in 2020. Originally from [place]. 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     Social Determinants of Health     Financial Resource Strain: Low Risk  (2/28/2024)    Received from  [facility]    Overall Financial Resource Strain (CARDIA)     Difficulty of Paying Living Expenses: Not very hard   Food Insecurity: No Food Insecurity (2/20/2024)    Received from  [facility]    Hunger Vital Sign     Worried About Running Out of Food in the Last Year: Never true     Ran Out of Food in the Last Year: Never true   Transportation Needs: No Transportation Needs (2/20/2024)    Received from [facility]    PRAPARE - Transportation     Lack of Transportation (Medical): No     Lack of Transportation (Non-Medical): No   Physical Activity: Sufficiently Active (2/28/2024)    Received from  [facility]    Exercise Vital Sign     Days of Exercise per Week: 2 days     Minutes of Exercise per Session: 120 min   Stress: No Stress Concern Present (2/28/2024)    Received from [facility]    [place] [employer] - Occupational Stress Questionnaire     Feeling of Stress : Not at all   Social Connections: Unknown (2/28/2024)    " Received from [facility]    Social Connection and Isolation Panel [NHANES]     Frequency of Communication with Friends and Family: Twice a week     Frequency of Social Gatherings with Friends and Family: Not on file     Attends Mosque Services: Never     Active Member of Clubs or Organizations: No     Attends Club or Organization Meetings: Never     Marital Status: Never    Interpersonal Safety: Not At Risk (2/20/2024)    Received from [facility]    Humiliation, Afraid, Rape, and Kick questionnaire     Fear of Current or Ex-Partner: No     Emotionally Abused: No     Physically Abused: No     Sexually Abused: No   Housing Stability: Unknown (2/20/2024)    Received from [facility]    Housing Stability Vital Sign     Unable to Pay for Housing in the Last Year: No     Number of Places Lived in the Last Year: Not on file     In the last 12 months, was there a time when you did not have a steady place to sleep or slept in a shelter (including now)?: No       Current Medications:    Current Outpatient Medications   Medication Sig Dispense Refill    acetaminophen-codeine (TYLENOL W/CODEINE #3) 300-30 MG per tablet Take 1-2 tablets by mouth as needed (Patient not taking: Reported on 7/1/2024)      HYDROcodone-acetaminophen (NORCO) 5-325 MG tablet Take 1-2 tablets by mouth as needed (Patient not taking: Reported on 7/1/2024)       No current facility-administered medications for this visit.       Physical Exam:  /81 (BP Location: Right arm, Patient Position: Sitting, Cuff Size: Adult Large)   Pulse 80   Temp 98.3  F (36.8  C) (Oral)   Resp 16   Wt 104.9 kg (231 lb 3.2 oz)   SpO2 98%   BMI 31.14 kg/m      General: Well-appearing male, NAD  Eyes: EOMI, PERRL. No scleral icterus.  Cardiovascular: RRR, no m/g/r. No peripheral edema.  Respiratory: CTA bilaterally. No wheezes or crackles.  Neurologic: Grossly nonfocal.   Skin: No rashes, petechiae, or  bruising noted on exposed skin.    Labs:  Recent Results (from the past 24 hour(s))   Routine UA with microscopic - No culture    Collection Time: 07/01/24 11:05 AM   Result Value Ref Range    Color Urine Yellow Colorless, Straw, Light Yellow, Yellow    Appearance Urine Clear Clear    Glucose Urine Negative Negative mg/dL    Bilirubin Urine Negative Negative    Ketones Urine Negative Negative mg/dL    Specific Gravity Urine 1.016 1.003 - 1.035    Blood Urine Negative Negative    pH Urine 5.5 5.0 - 7.0    Protein Albumin Urine 10 (A) Negative mg/dL    Urobilinogen Urine Normal Normal, 2.0 mg/dL    Nitrite Urine Negative Negative    Leukocyte Esterase Urine Negative Negative    Mucus Urine Present (A) None Seen /LPF    RBC Urine 1 <=2 /HPF    WBC Urine 1 <=5 /HPF    Squamous Epithelials Urine <1 <=1 /HPF   Comprehensive metabolic panel    Collection Time: 07/01/24 11:05 AM   Result Value Ref Range    Sodium 138 135 - 145 mmol/L    Potassium 3.8 3.4 - 5.3 mmol/L    Carbon Dioxide (CO2) 22 22 - 29 mmol/L    Anion Gap 11 7 - 15 mmol/L    Urea Nitrogen 12.2 6.0 - 20.0 mg/dL    Creatinine 0.72 0.67 - 1.17 mg/dL    GFR Estimate >90 >60 mL/min/1.73m2    Calcium 9.2 8.6 - 10.0 mg/dL    Chloride 105 98 - 107 mmol/L    Glucose 93 70 - 99 mg/dL    Alkaline Phosphatase 107 40 - 150 U/L    AST 48 (H) 0 - 45 U/L    ALT 45 0 - 70 U/L    Protein Total 8.0 6.4 - 8.3 g/dL    Albumin 4.6 3.5 - 5.2 g/dL    Bilirubin Total 1.4 (H) <=1.2 mg/dL   Ferritin    Collection Time: 07/01/24 11:05 AM   Result Value Ref Range    Ferritin 74 31 - 409 ng/mL   Adult Type and Screen    Collection Time: 07/01/24 11:05 AM   Result Value Ref Range    ABO/RH(D) O POS     Antibody Screen Negative Negative    SPECIMEN EXPIRATION DATE [id number]    CBC with platelets and differential    Collection Time: 07/01/24 11:05 AM   Result Value Ref Range    WBC Count 9.0 4.0 - 11.0 10e3/uL    RBC Count 4.03 (L) 4.40 - 5.90 10e6/uL    Hemoglobin 11.2 (L) 13.3 - 17.7  g/dL    Hematocrit 33.5 (L) 40.0 - 53.0 %    MCV 83 78 - 100 fL    MCH 27.8 26.5 - 33.0 pg    MCHC 33.4 31.5 - 36.5 g/dL    RDW 18.5 (H) 10.0 - 15.0 %    Platelet Count 521 (H) 150 - 450 10e3/uL    % Neutrophils      % Lymphocytes      % Monocytes      % Eosinophils      % Basophils      % Immature Granulocytes      NRBCs per 100 WBC 0 <1 /100    Absolute Neutrophils      Absolute Lymphocytes      Absolute Monocytes      Absolute Eosinophils      Absolute Basophils      Absolute Immature Granulocytes      Absolute NRBCs 0.0 10e3/uL   Manual Differential    Collection Time: 07/01/24 11:05 AM   Result Value Ref Range    % Neutrophils 57 %    % Lymphocytes 20 %    % Monocytes 11 %    % Eosinophils 11 %    % Basophils 0 %    % Metamyelocytes 1 %    NRBCs per 100 WBC 2 (H) <=0 %    Absolute Neutrophils 5.1 1.6 - 8.3 10e3/uL    Absolute Lymphocytes 1.8 0.8 - 5.3 10e3/uL    Absolute Monocytes 1.0 0.0 - 1.3 10e3/uL    Absolute Eosinophils 1.0 (H) 0.0 - 0.7 10e3/uL    Absolute Basophils 0.0 0.0 - 0.2 10e3/uL    Absolute Metamyelocytes 0.1 (H) <=0.0 10e3/uL    Absolute NRBCs 0.2 (H) <=0.0 10e3/uL    RBC Morphology Confirmed RBC Indices     Platelet Assessment  Automated Count Confirmed. Platelet morphology is normal.     Automated Count Confirmed. Platelet morphology is normal.    Polychromasia Slight (A) None Seen    Target Cells Slight (A) None Seen         Assessment:   Ricky Pak is a 28 year old male with HbSS Disease and a history of macular infarct who has elected to continue to receive sickle cell care in [place] routinely. He continues to travel for work every few months, last in [place] in winter 2024, but back in [place] and thinking of settling in more permanently. He remains on monthly exchange transfusions which he receives here in [place]. Denies current issues with exchange transfusions for vascular access.    HbSS  -continue exchange transfusions (next one later this week)    History of macular  infarct  -Exchange transfusions per stroke protocol (DOES NOT HAVE PORT--STILL DOES PERIPHERAL ACCESS)  -Ferritin checked today and was within normal limits  -Needs repeat eye exam (some blurriness reported)    Slight proteinuria  -Now seen on repeat UA, though low level  -Continue monitoring UA each visit for now. Low threshold to start lisinopril, with consideration of nephrology consult down the line.    Healthcare maintenance  He will continue to seek out dental evaluation.   -MenACWY given today. Needs MenB at next visit    Tendinitis  The mild pain in his ankles/achilles in the past is gone, so it seems like it was tendinitis. No further evaluation right now      Daily Plan:  1) Labs: CBC, retic, ferritin. Usually has type and screen prior to apheresis visit. UA and CMP on return.  2) Orders: Adult ophtho re-contacted  3) Follow up: October 2024    45 minutes spent on the date of the encounter doing chart review, review of test results, patient visit, and documentation     Manny Denson MD  Classical Hematologist  Division of Hematology, Oncology, and Transplantation  [facility]  [facility] [place]  Pager: [phone]    The longitudinal plan of care for the diagnosis(es)/condition(s) as documented were addressed during this visit. Due to the added complexity in care, I will continue to support Ricky in the subsequent management and with ongoing continuity of care.

## 2024-07-02 LAB
ABO/RH(D): NORMAL
ANTIBODY SCREEN: NEGATIVE
SPECIMEN EXPIRATION DATE: NORMAL

## 2024-07-03 ENCOUNTER — LAB (OUTPATIENT)
Dept: LAB | Facility: CLINIC | Age: 29
End: 2024-07-03
Attending: PEDIATRICS
Payer: COMMERCIAL

## 2024-07-03 DIAGNOSIS — Z86.73 HISTORY OF CVA (CEREBROVASCULAR ACCIDENT): ICD-10-CM

## 2024-07-03 DIAGNOSIS — D57.1 HB-SS DISEASE WITHOUT CRISIS (H): ICD-10-CM

## 2024-07-03 LAB
BASOPHILS # BLD AUTO: 0.2 10E3/UL (ref 0–0.2)
BASOPHILS NFR BLD AUTO: 2 %
EOSINOPHIL # BLD AUTO: 0.6 10E3/UL (ref 0–0.7)
EOSINOPHIL NFR BLD AUTO: 8 %
ERYTHROCYTE [DISTWIDTH] IN BLOOD BY AUTOMATED COUNT: 18.5 % (ref 10–15)
HCT VFR BLD AUTO: 32.8 % (ref 40–53)
HGB BLD-MCNC: 10.9 G/DL (ref 13.3–17.7)
IMM GRANULOCYTES # BLD: 0 10E3/UL
IMM GRANULOCYTES NFR BLD: 0 %
LYMPHOCYTES # BLD AUTO: 1.7 10E3/UL (ref 0.8–5.3)
LYMPHOCYTES NFR BLD AUTO: 23 %
MCH RBC QN AUTO: 27.5 PG (ref 26.5–33)
MCHC RBC AUTO-ENTMCNC: 33.2 G/DL (ref 31.5–36.5)
MCV RBC AUTO: 83 FL (ref 78–100)
MONOCYTES # BLD AUTO: 0.8 10E3/UL (ref 0–1.3)
MONOCYTES NFR BLD AUTO: 11 %
NEUTROPHILS # BLD AUTO: 4 10E3/UL (ref 1.6–8.3)
NEUTROPHILS NFR BLD AUTO: 56 %
NRBC # BLD AUTO: 0 10E3/UL
NRBC BLD AUTO-RTO: 1 /100
PLATELET # BLD AUTO: 508 10E3/UL (ref 150–450)
RBC # BLD AUTO: 3.96 10E6/UL (ref 4.4–5.9)
WBC # BLD AUTO: 7.3 10E3/UL (ref 4–11)

## 2024-07-03 PROCEDURE — 36415 COLL VENOUS BLD VENIPUNCTURE: CPT

## 2024-07-03 PROCEDURE — 85041 AUTOMATED RBC COUNT: CPT

## 2024-07-03 PROCEDURE — 86900 BLOOD TYPING SEROLOGIC ABO: CPT

## 2024-07-03 PROCEDURE — 86923 COMPATIBILITY TEST ELECTRIC: CPT

## 2024-07-03 NOTE — NURSING NOTE
Chief Complaint   Patient presents with    Labs Only     Labs drawn by RN in Lab via Right Arm VPT.      Yanelis Crocker RN

## 2024-07-04 RX ORDER — ASPIRIN 325 MG
325 TABLET ORAL ONCE
Status: CANCELLED | OUTPATIENT
Start: 2024-07-04

## 2024-07-04 RX ORDER — HYDROXYZINE HYDROCHLORIDE 25 MG/1
25 TABLET, FILM COATED ORAL ONCE
Status: CANCELLED | OUTPATIENT
Start: 2024-07-04

## 2024-07-05 ENCOUNTER — HOSPITAL ENCOUNTER (OUTPATIENT)
Dept: LAB | Facility: CLINIC | Age: 29
Discharge: HOME | End: 2024-07-05
Attending: PEDIATRICS | Admitting: PEDIATRICS
Payer: COMMERCIAL

## 2024-07-05 VITALS
TEMPERATURE: 97.5 F | SYSTOLIC BLOOD PRESSURE: 117 MMHG | WEIGHT: 231.48 LBS | RESPIRATION RATE: 18 BRPM | BODY MASS INDEX: 31.18 KG/M2 | HEART RATE: 85 BPM | DIASTOLIC BLOOD PRESSURE: 78 MMHG

## 2024-07-05 LAB
HCT VFR BLD AUTO: 28.7 % (ref 40–53)
HCT VFR BLD AUTO: 30.4 % (ref 40–53)
HGB BLD-MCNC: 10.4 G/DL (ref 13.3–17.7)
HGB BLD-MCNC: 9.6 G/DL (ref 13.3–17.7)

## 2024-07-05 PROCEDURE — 83020 HEMOGLOBIN ELECTROPHORESIS: CPT | Performed by: PATHOLOGY

## 2024-07-05 PROCEDURE — 250N000013 HC RX MED GY IP 250 OP 250 PS 637: Performed by: PATHOLOGY

## 2024-07-05 PROCEDURE — 85018 HEMOGLOBIN: CPT | Performed by: PATHOLOGY

## 2024-07-05 PROCEDURE — 36415 COLL VENOUS BLD VENIPUNCTURE: CPT | Performed by: PATHOLOGY

## 2024-07-05 PROCEDURE — P9016 RBC LEUKOCYTES REDUCED: HCPCS

## 2024-07-05 PROCEDURE — 36512 APHERESIS RBC: CPT

## 2024-07-05 PROCEDURE — 250N000009 HC RX 250: Performed by: PATHOLOGY

## 2024-07-05 RX ORDER — HYDROXYZINE HYDROCHLORIDE 25 MG/1
25 TABLET, FILM COATED ORAL ONCE
Status: COMPLETED | OUTPATIENT
Start: 2024-07-05 | End: 2024-07-05

## 2024-07-05 RX ORDER — ASPIRIN 325 MG
325 TABLET ORAL ONCE
Status: COMPLETED | OUTPATIENT
Start: 2024-07-05 | End: 2024-07-05

## 2024-07-05 RX ADMIN — HYDROXYZINE HYDROCHLORIDE 25 MG: 25 TABLET, FILM COATED ORAL at 09:18

## 2024-07-05 RX ADMIN — ASPIRIN 325 MG ORAL TABLET 325 MG: 325 PILL ORAL at 09:17

## 2024-07-05 RX ADMIN — ANTICOAGULANT CITRATE DEXTROSE SOLUTION FORMULA A 676 ML: 12.25; 11; 3.65 SOLUTION INTRAVENOUS at 09:45

## 2024-07-05 NOTE — PROCEDURES
Transfusion Medicine  Apheresis Procedure Note    Ricky Pak [id number]   YOB: 1995 Age: 28 year old         Procedure:  Red blood cell exchange (RBCX)           Assessment and Plan:   Ricky Pak is a 28 year old male with sickle cell disease (SCD) who underwent maintenance RBC exchange. Peripheral veins were used for access.  Notes feeling well.  Denies nausea, vomiting, fevers, chills.  No other concerns.  He overall tolerated the procedure, he did feel little lightheaded after returning from the rest room, this improved with some apple juice and time.  He is encouraged to eat some breakfast before he arrives for future exchanges.     Had a recent visit with the hematology team here in [place].  Has another checkin with them in October.  He notes that he should be in our area until November.     His SCD remains under good control with regular RBC exchange and the plan is to continue ~monthly RBC exchanges.              History of Present Illness:   Ricky Pak is a 28 year old male with a past medical history include gallstones (now post cholecystectomy), and SCD complicated by a CVA with macular infarct and iron overload secondary to repeated blood transfusions. He began apheresis-based RBC exchanges around 2013 following his issues with iron overload. These exchanges have done a good job preventing sickle cell crises.  When he is in the area, he will come and receive red blood cell exchanges here at the [facility].              Past Medical History:      Past Medical History:   Diagnosis Date    Gallstones 2011    s/p cholecystectomy    Hb-SS disease without crisis (H)     History of CVA (cerebrovascular accident) 07/2006    macular infarct, but regained vision    Iron overload due to repeated red blood cell transfusions 2013    s/p chelation    Sleep apnea     Treated with CPAP.               Past Surgical History:     Past Surgical History:   Procedure Laterality  Date    CHOLECYSTECTOMY N/A 2011    LIVER BIOPSY N/A 2007    iron overload monitoring    LIVER BIOPSY N/A 2008    monitoring for iron overload             Allergies:     Allergies   Allergen Reactions    Diphenhydramine Hives             Medications:     No current outpatient medications on file.     No current facility-administered medications for this encounter.             Review of Systems:     See above           Exam:   BP (!) 154/74   Pulse 64   Temp 97.5  F (36.4  C)   Resp 18   Wt 105 kg (231 lb 7.7 oz)   BMI 31.18 kg/m    Alert,  no apparent distress  Breathing appears comfortable on room air  Peripheral IV access for the procedure.             Data:     Results for orders placed or performed during the hospital encounter of 07/05/24 (from the past 24 hour(s))   Hemoglobin and hematocrit   Result Value Ref Range    Hemoglobin 10.4 (L) 13.3 - 17.7 g/dL    Hematocrit 30.4 (L) 40.0 - 53.0 %   Hemoglobin and hematocrit   Result Value Ref Range    Hemoglobin 9.6 (L) 13.3 - 17.7 g/dL    Hematocrit 28.7 (L) 40.0 - 53.0 %       BMP  Recent Labs   Lab 07/01/24  1105      POTASSIUM 3.8   CHLORIDE 105   BALBINA 9.2   CO2 22   BUN 12.2   CR 0.72   GLC 93     CBC  Recent Labs   Lab 07/05/24  1208 07/05/24  0907 07/03/24  1217 07/01/24  1105   WBC  --   --  7.3 9.0   RBC  --   --  3.96* 4.03*   HGB 9.6* 10.4* 10.9* 11.2*   HCT 28.7* 30.4* 32.8* 33.5*   MCV  --   --  83 83   MCH  --   --  27.5 27.8   MCHC  --   --  33.2 33.4   RDW  --   --  18.5* 18.5*   PLT  --   --  508* 521*               Procedure Summary:   A red blood cell exchange was performed using donor red blood cells as the replacement product.    Peripheral veins were used for access and allowed for appropriate flow during the procedure.  ACD-A was used for anticoagulation.   The patient's vital signs were stable throughout.  The patient tolerated the procedure.  See apheresis flowsheet for additional details.        Attestation: During the procedure  the patient was directly seen and evaluated by me, Maikel Del Toro MD.  I have reviewed the chart and pertinent laboratory findings, and discussed the patient and the current procedure with the Apheresis nursing staff.    Maikel Del Toro MD  Transfusion Medicine Attending  Laboratory Medicine & Pathology

## 2024-07-05 NOTE — DISCHARGE INSTRUCTIONS
Apheresis Blood Donor Center Post Instructions  You may feel tired after your procedure today.   Please call your doctor if you have:  bleeding that doesn t stop, fever, pain where a needle or tube (catheter) was placed, seizures, trouble breathing, red urine, nausea or vomiting, other health concerns.     If your symptoms are severe, call [phone].  Your veins were used, keep the bandages on for 2-4 hours.  Avoid heavy lifting with your arms.  If bleeding occurs from these sites, apply firm pressure for 5-10 minutes.  Call your physician if bleeding continues.    The Apheresis/Blood Donor Center is open Monday-Friday 7:30 a.m. to 5 p.m.  The phone number is [phone].  A Transfusion Medicine physician can be reached after 5:00 p.m. weekdays and on weekends /Holidays by calling [phone], and asking for the physician on call.      Red blood cell exchange:   You received blood products (red blood cells) as part of your treatment, you need to be aware that transfusion reactions can occur up to several hours after they have been given to you.  Call your physician if you experience any symptoms in the next 48 hours, including breathing problems, rash, itching, hives, nausea or vomiting, fever or chills, blood in your urine or stools, or joint pain.  Please inform the Transfusion Medicine Physician by calling [phone] and asking for the physician on call.

## 2024-07-09 LAB
HGB S BLD QL: ABNORMAL
HGB S BLD QL: ABNORMAL
HGB S MFR BLD ELPH: 9.2 %

## 2024-07-11 ENCOUNTER — TELEPHONE (OUTPATIENT)
Dept: OPHTHALMOLOGY | Facility: CLINIC | Age: 29
End: 2024-07-11
Payer: COMMERCIAL

## 2024-07-11 NOTE — TELEPHONE ENCOUNTER
I received message today by referring clinic to assist in scheduling eye exam soon secondary to complaint of blurrier vision.    H/o sickle cell disease.    I called pt home/mobile ([phone]) at [phone] and left message with direct number at [phone]    Nic Navarrete RN 2:05 PM 07/11/24

## 2024-07-12 NOTE — TELEPHONE ENCOUNTER
Home/mobile [phone]     Spoke to pt at 0919    Pt reporting fuzzier vision in each eye even with glasses on    Pt states progressively changing over one year    No new floaters  No flashing    No vision loss noted per pt.    Scheduled next available with retina provider/Dr. Maurice Euceda on August 8th at 730 AM    Pt aware of date/time/location/duration/hospital based clinic and aware to reach out between now and appointment for any new/acute vision changes and/or eye symptoms.    Nic Navarrete RN 9:25 AM 07/12/24

## 2024-07-14 ENCOUNTER — HEALTH MAINTENANCE LETTER (OUTPATIENT)
Age: 29
End: 2024-07-14

## 2024-07-24 DIAGNOSIS — D57.1 HB-SS DISEASE WITHOUT CRISIS (H): Primary | ICD-10-CM

## 2024-08-06 LAB
ABO/RH(D): NORMAL
ANTIBODY SCREEN: NEGATIVE
SPECIMEN EXPIRATION DATE: NORMAL

## 2024-08-07 ENCOUNTER — LAB (OUTPATIENT)
Dept: LAB | Facility: CLINIC | Age: 29
End: 2024-08-07
Attending: PEDIATRICS
Payer: COMMERCIAL

## 2024-08-07 DIAGNOSIS — D57.1 HB-SS DISEASE WITHOUT CRISIS (H): ICD-10-CM

## 2024-08-07 DIAGNOSIS — Z86.73 HISTORY OF CVA (CEREBROVASCULAR ACCIDENT): ICD-10-CM

## 2024-08-07 LAB
BASOPHILS # BLD AUTO: 0.1 10E3/UL (ref 0–0.2)
BASOPHILS NFR BLD AUTO: 2 %
EOSINOPHIL # BLD AUTO: 0.4 10E3/UL (ref 0–0.7)
EOSINOPHIL NFR BLD AUTO: 7 %
ERYTHROCYTE [DISTWIDTH] IN BLOOD BY AUTOMATED COUNT: 21.2 % (ref 10–15)
HCT VFR BLD AUTO: 33.5 % (ref 40–53)
HGB BLD-MCNC: 10.8 G/DL (ref 13.3–17.7)
IMM GRANULOCYTES # BLD: 0 10E3/UL
IMM GRANULOCYTES NFR BLD: 0 %
LYMPHOCYTES # BLD AUTO: 1.7 10E3/UL (ref 0.8–5.3)
LYMPHOCYTES NFR BLD AUTO: 28 %
MCH RBC QN AUTO: 25.7 PG (ref 26.5–33)
MCHC RBC AUTO-ENTMCNC: 32.2 G/DL (ref 31.5–36.5)
MCV RBC AUTO: 80 FL (ref 78–100)
MONOCYTES # BLD AUTO: 0.7 10E3/UL (ref 0–1.3)
MONOCYTES NFR BLD AUTO: 12 %
NEUTROPHILS # BLD AUTO: 3.1 10E3/UL (ref 1.6–8.3)
NEUTROPHILS NFR BLD AUTO: 51 %
NRBC # BLD AUTO: 0.1 10E3/UL
NRBC BLD AUTO-RTO: 1 /100
PLATELET # BLD AUTO: 503 10E3/UL (ref 150–450)
RBC # BLD AUTO: 4.2 10E6/UL (ref 4.4–5.9)
WBC # BLD AUTO: 6.1 10E3/UL (ref 4–11)

## 2024-08-07 PROCEDURE — 85025 COMPLETE CBC W/AUTO DIFF WBC: CPT

## 2024-08-07 PROCEDURE — 86900 BLOOD TYPING SEROLOGIC ABO: CPT

## 2024-08-07 PROCEDURE — 86923 COMPATIBILITY TEST ELECTRIC: CPT

## 2024-08-07 PROCEDURE — 36415 COLL VENOUS BLD VENIPUNCTURE: CPT

## 2024-08-08 ENCOUNTER — OFFICE VISIT (OUTPATIENT)
Dept: OPHTHALMOLOGY | Facility: CLINIC | Age: 29
End: 2024-08-08
Payer: COMMERCIAL

## 2024-08-08 DIAGNOSIS — D57.1 HB-SS DISEASE WITHOUT CRISIS (H): ICD-10-CM

## 2024-08-08 PROBLEM — D50.9 MICROCYTIC ANEMIA: Status: ACTIVE | Noted: 2024-02-21

## 2024-08-08 PROCEDURE — 99207 FUNDUS PHOTOS OU (BOTH EYES): CPT | Mod: 26

## 2024-08-08 PROCEDURE — 86923 COMPATIBILITY TEST ELECTRIC: CPT

## 2024-08-08 PROCEDURE — 92134 CPTRZ OPH DX IMG PST SGM RTA: CPT

## 2024-08-08 PROCEDURE — 92250 FUNDUS PHOTOGRAPHY W/I&R: CPT | Mod: 59

## 2024-08-08 PROCEDURE — 99213 OFFICE O/P EST LOW 20 MIN: CPT

## 2024-08-08 PROCEDURE — 99214 OFFICE O/P EST MOD 30 MIN: CPT

## 2024-08-08 RX ORDER — DIPHENHYDRAMINE HYDROCHLORIDE 50 MG/ML
50 INJECTION INTRAMUSCULAR; INTRAVENOUS
Status: CANCELLED | OUTPATIENT
Start: 2024-08-08

## 2024-08-08 RX ORDER — HYDROXYZINE HYDROCHLORIDE 50 MG/1
50 TABLET, FILM COATED ORAL ONCE
Status: CANCELLED | OUTPATIENT
Start: 2024-08-08

## 2024-08-08 RX ORDER — ASPIRIN 325 MG
325 TABLET ORAL ONCE
Status: CANCELLED | OUTPATIENT
Start: 2024-08-08

## 2024-08-08 RX ORDER — HYDROXYZINE HYDROCHLORIDE 25 MG/1
25 TABLET, FILM COATED ORAL ONCE
Status: CANCELLED | OUTPATIENT
Start: 2024-08-08

## 2024-08-08 RX ORDER — CALCIUM CARBONATE 500 MG/1
500 TABLET, CHEWABLE ORAL ONCE
Status: CANCELLED | OUTPATIENT
Start: 2024-08-08 | End: 2024-08-08

## 2024-08-08 RX ORDER — CALCIUM CARBONATE 500 MG/1
1000 TABLET, CHEWABLE ORAL
Status: CANCELLED | OUTPATIENT
Start: 2024-08-08

## 2024-08-08 ASSESSMENT — CONF VISUAL FIELD
OD_SUPERIOR_NASAL_RESTRICTION: 0
OD_NORMAL: 1
OS_INFERIOR_TEMPORAL_RESTRICTION: 0
OS_INFERIOR_NASAL_RESTRICTION: 0
OS_NORMAL: 1
OS_SUPERIOR_TEMPORAL_RESTRICTION: 0
OD_INFERIOR_NASAL_RESTRICTION: 0
OS_SUPERIOR_NASAL_RESTRICTION: 0
OD_SUPERIOR_TEMPORAL_RESTRICTION: 0
METHOD: COUNTING FINGERS
OD_INFERIOR_TEMPORAL_RESTRICTION: 0

## 2024-08-08 ASSESSMENT — CUP TO DISC RATIO
OS_RATIO: 0.3
OD_RATIO: 0.3

## 2024-08-08 ASSESSMENT — REFRACTION_WEARINGRX
OD_SPHERE: -1.50
OD_AXIS: 060
OS_SPHERE: -0.25
OD_CYLINDER: +0.25

## 2024-08-08 ASSESSMENT — TONOMETRY
OD_IOP_MMHG: 20
IOP_METHOD: TONOPEN
OS_IOP_MMHG: 19

## 2024-08-08 ASSESSMENT — EXTERNAL EXAM - LEFT EYE: OS_EXAM: NORMAL

## 2024-08-08 ASSESSMENT — VISUAL ACUITY
CORRECTION_TYPE: GLASSES
OD_CC: 20/25
METHOD_MR: DECLINES MR TODAY
METHOD: SNELLEN - LINEAR
OD_CC+: -1
OS_CC: 20/70
OS_PH_CC: 20/40

## 2024-08-08 ASSESSMENT — SLIT LAMP EXAM - LIDS
COMMENTS: NORMAL
COMMENTS: NORMAL

## 2024-08-08 ASSESSMENT — EXTERNAL EXAM - RIGHT EYE: OD_EXAM: NORMAL

## 2024-08-08 NOTE — NURSING NOTE
Chief Complaints and History of Present Illnesses   Patient presents with    Retinal Evaluation     Chief Complaint(s) and History of Present Illness(es)       Retinal Evaluation              Laterality: both eyes    Onset: years ago    Quality: Feels the va is a little fuzzy    Associated symptoms: Negative for photophobia, flashes and floaters    Treatments tried: no treatments    Pain scale: 0/10              Comments    Here for Hb-SS disease  Yeimi Hernandez COT 7:21 AM August 8, 2024

## 2024-08-08 NOTE — PROGRESS NOTES
CC: sickle cell retinopathy    HPI: Ricky Pak is a 27 y/o male with a history of sickle cell disease here for retinal follow up. Last eye exam was in 08/2022. He had a suspected CVA of the left eye at age 11. He has regained some of his vision in the left eye but still has an area of missing central vision. Right eye has not had similar incident and has good vision but he does feel it has become more blurry in the past few years. Glasses are about 2 years old. No questions or concerns today.    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  I personally obtained the chief complaint(s) and history of present illness.  I confirmed and edited as necessary the review of systems, past medical/surgical history, family history, social history, and examination findings as documented by others; and I examined the patient myself.  I personally reviewed the relevant tests, images, and reports as documented above.  I formulated and edited as necessary  the assessment and plan and discussed the findings and management plan with the patient and family. Shyann Euceda MD

## 2024-08-09 ENCOUNTER — HOSPITAL ENCOUNTER (OUTPATIENT)
Dept: LAB | Facility: CLINIC | Age: 29
Discharge: HOME | End: 2024-08-09
Attending: PEDIATRICS | Admitting: PEDIATRICS
Payer: COMMERCIAL

## 2024-08-09 VITALS
RESPIRATION RATE: 16 BRPM | HEART RATE: 84 BPM | TEMPERATURE: 97.8 F | BODY MASS INDEX: 31.57 KG/M2 | WEIGHT: 234.35 LBS | SYSTOLIC BLOOD PRESSURE: 127 MMHG | DIASTOLIC BLOOD PRESSURE: 71 MMHG

## 2024-08-09 DIAGNOSIS — D57.1 HB-SS DISEASE WITHOUT CRISIS (H): Primary | ICD-10-CM

## 2024-08-09 DIAGNOSIS — D57.1 HB-SS DISEASE WITHOUT CRISIS (H): ICD-10-CM

## 2024-08-09 LAB
ACANTHOCYTES BLD QL SMEAR: ABNORMAL
AUER BODIES BLD QL SMEAR: ABNORMAL
BASO STIPL BLD QL SMEAR: ABNORMAL
BASOPHILS # BLD AUTO: 0.1 10E3/UL (ref 0–0.2)
BASOPHILS NFR BLD AUTO: 1 %
BITE CELLS BLD QL SMEAR: ABNORMAL
BLD PROD TYP BPU: NORMAL
BLISTER CELLS BLD QL SMEAR: ABNORMAL
BLOOD COMPONENT TYPE: NORMAL
BURR CELLS BLD QL SMEAR: ABNORMAL
CODING SYSTEM: NORMAL
CROSSMATCH: NORMAL
DACRYOCYTES BLD QL SMEAR: ABNORMAL
ELLIPTOCYTES BLD QL SMEAR: ABNORMAL
EOSINOPHIL # BLD AUTO: 0.2 10E3/UL (ref 0–0.7)
EOSINOPHIL NFR BLD AUTO: 6 %
ERYTHROCYTE [DISTWIDTH] IN BLOOD BY AUTOMATED COUNT: 16.5 % (ref 10–15)
FRAGMENTS BLD QL SMEAR: ABNORMAL
HCT VFR BLD AUTO: 31.3 % (ref 40–53)
HCT VFR BLD AUTO: 32.1 % (ref 40–53)
HCT VFR BLD AUTO: 32.1 % (ref 40–53)
HGB BLD-MCNC: 10.3 G/DL (ref 13.3–17.7)
HGB BLD-MCNC: 10.8 G/DL (ref 13.3–17.7)
HGB BLD-MCNC: 10.8 G/DL (ref 13.3–17.7)
HGB C CRYSTALS: ABNORMAL
HOWELL-JOLLY BOD BLD QL SMEAR: ABNORMAL
IMM GRANULOCYTES # BLD: 0 10E3/UL
IMM GRANULOCYTES NFR BLD: 1 %
ISSUE DATE AND TIME: NORMAL
LYMPHOCYTES # BLD AUTO: 1.4 10E3/UL (ref 0.8–5.3)
LYMPHOCYTES NFR BLD AUTO: 36 %
MCH RBC QN AUTO: 28.7 PG (ref 26.5–33)
MCHC RBC AUTO-ENTMCNC: 34.4 G/DL (ref 31.5–36.5)
MCV RBC AUTO: 84 FL (ref 78–100)
MONOCYTES # BLD AUTO: 0.4 10E3/UL (ref 0–1.3)
MONOCYTES NFR BLD AUTO: 11 %
NEUTROPHILS # BLD AUTO: 1.8 10E3/UL (ref 1.6–8.3)
NEUTROPHILS NFR BLD AUTO: 45 %
NEUTS HYPERSEG BLD QL SMEAR: ABNORMAL
NRBC # BLD AUTO: 0 10E3/UL
NRBC BLD AUTO-RTO: 1 /100
PLAT MORPH BLD: ABNORMAL
PLATELET # BLD AUTO: 141 10E3/UL (ref 150–450)
POLYCHROMASIA BLD QL SMEAR: ABNORMAL
RBC # BLD AUTO: 3.87 10E6/UL (ref 4.4–5.9)
RBC AGGLUT BLD QL: ABNORMAL
RBC MORPH BLD: ABNORMAL
ROULEAUX BLD QL SMEAR: ABNORMAL
SICKLE CELLS BLD QL SMEAR: ABNORMAL
SMUDGE CELLS BLD QL SMEAR: ABNORMAL
SPHEROCYTES BLD QL SMEAR: ABNORMAL
STOMATOCYTES BLD QL SMEAR: ABNORMAL
TARGETS BLD QL SMEAR: ABNORMAL
TOXIC GRANULES BLD QL SMEAR: ABNORMAL
UNIT ABO/RH: NORMAL
UNIT NUMBER: NORMAL
UNIT STATUS: NORMAL
UNIT TYPE ISBT: 5100
UNIT TYPE ISBT: 5100
UNIT TYPE ISBT: 9500
VARIANT LYMPHS BLD QL SMEAR: ABNORMAL
WBC # BLD AUTO: 3.8 10E3/UL (ref 4–11)

## 2024-08-09 PROCEDURE — 36415 COLL VENOUS BLD VENIPUNCTURE: CPT

## 2024-08-09 PROCEDURE — 85025 COMPLETE CBC W/AUTO DIFF WBC: CPT

## 2024-08-09 PROCEDURE — 250N000013 HC RX MED GY IP 250 OP 250 PS 637

## 2024-08-09 PROCEDURE — 85004 AUTOMATED DIFF WBC COUNT: CPT | Performed by: PEDIATRICS

## 2024-08-09 PROCEDURE — 83020 HEMOGLOBIN ELECTROPHORESIS: CPT

## 2024-08-09 PROCEDURE — P9016 RBC LEUKOCYTES REDUCED: HCPCS

## 2024-08-09 PROCEDURE — 250N000009 HC RX 250

## 2024-08-09 PROCEDURE — 36512 APHERESIS RBC: CPT

## 2024-08-09 RX ORDER — HYDROXYZINE HYDROCHLORIDE 25 MG/1
25 TABLET, FILM COATED ORAL ONCE
Status: COMPLETED | OUTPATIENT
Start: 2024-08-09 | End: 2024-08-09

## 2024-08-09 RX ORDER — ASPIRIN 325 MG
325 TABLET ORAL ONCE
Status: COMPLETED | OUTPATIENT
Start: 2024-08-09 | End: 2024-08-09

## 2024-08-09 RX ORDER — HYDROXYZINE HYDROCHLORIDE 25 MG/1
50 TABLET, FILM COATED ORAL ONCE
Status: COMPLETED | OUTPATIENT
Start: 2024-08-09 | End: 2024-08-09

## 2024-08-09 RX ADMIN — ANTICOAGULANT CITRATE DEXTROSE SOLUTION FORMULA A 608 ML: 12.25; 11; 3.65 SOLUTION INTRAVENOUS at 09:00

## 2024-08-09 RX ADMIN — HYDROXYZINE HYDROCHLORIDE 25 MG: 25 TABLET, FILM COATED ORAL at 08:22

## 2024-08-09 RX ADMIN — ASPIRIN 325 MG ORAL TABLET 325 MG: 325 PILL ORAL at 08:21

## 2024-08-09 NOTE — DISCHARGE INSTRUCTIONS
Red blood cell exchange:   If you received blood products (plasma or red blood cells) as part of your treatment, you need to be aware that transfusion reactions can occur up to several hours after they have been given to you.  Call your physician if you experience any symptoms in the next 48 hours, including breathing problems, rash, itching, hives, nausea or vomiting, fever or chills, blood in your urine or stools, or joint pain.  Please inform the Transfusion Medicine Physician by calling [phone] and asking for the physician on call.    After Your Blood Transfusion  Discharge Instructions  After you leave  After you have a blood transfusion,* watch for signs of a transfusion reaction for the next 48 hours.   Signs to watch for:  Shaking or chills  Fever above 100.4 F  Headache  Nausea (feeling sich to your stomach)  Hives  Itching  Swelling of the face or feeling flushed  Ongoing dry cough (nothing is coughed up)  Trouble breathing, or wheezing  Call your clinic or 911, or go to the Emergency Room, if you have any signs of a transfusion reaction.  After the first 48 hours  Some signs of a reaction won't show up for a few days or up to 4 weeks. Call your clinic if you have symptoms of a transfusion reaction.  These may include:  Fatigue (feeling very tired)  Dizziness  Pink or red urine  *A blood transfusion is when you receive red blood cells, platelets, plasma or cryoprecipitate.   For informational purposes only. Not to replace the advice of your health care provider. Copyright   2015 [employer]. All rights reserved. [employer] [id number] - Rev 02/22.

## 2024-08-10 NOTE — PROCEDURES
Laboratory Medicine and Pathology  Transfusion Medicine - Apheresis Procedure Note    Ricky Pak MRN# [id number]   YOB: 1995 Age: 28 year old   Date of Procedure: 8/9/2024   Procedure: RBCx     Reason for Procedure: SCD         Assessment and Plan:   Ricky Pak is a 28 year old male with hemoglobin SS disease and a history of remote macular infarct (2006,  vision normalized) who underwent  a maintenance RBC exchange.  He tolerated the procedure well.            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 He was on monthly transfusions from 4072-0043 with chelation and in 2013 he was switched to peripheral RBC exchange which has kept him in good health . He no longer needs chelation and is able to keep his HbS ~30% or below with regularity. He grew up in [place] and has spent most of his life in the [facility]. 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 When he is in the area, he will come and receive red blood cell exchanges here at the [facility].       He did have a hypotensive episode following a previous apheresis procedure here which was a depletion followed by an exchange, so we have avoided depletion-exchanges and gone with conventional RBC exchanges          Past Medical History:     Past Medical History:   Diagnosis Date    Gallstones 2011    s/p cholecystectomy    Hb-SS disease without crisis (H)     History of CVA (cerebrovascular accident) 07/2006    macular infarct, but regained vision    Iron overload due to repeated red blood cell transfusions 2013    s/p chelation    Sleep apnea     Treated with CPAP.             Past Surgical History:     Past  "Surgical History:   Procedure Laterality Date    CHOLECYSTECTOMY N/A 2011    LIVER BIOPSY N/A 2007    iron overload monitoring    LIVER BIOPSY N/A 2008    monitoring for iron overload              Social History:     Social History     Tobacco Use    Smoking status: Never     Passive exposure: Never    Smokeless tobacco: Never   Substance Use Topics    Alcohol use: Yes     Alcohol/week: 2.0 standard drinks of alcohol     Types: 2 Standard drinks or equivalent per week     Comment: \"A few beers every other weekend.\"            Allergies:     Allergies   Allergen Reactions    Diphenhydramine Hives             Medications:     No current outpatient medications on file.     No current facility-administered medications for this encounter.                    Abbreviated Physical Exam:   /71   Pulse 84   Temp 97.8  F (36.6  C) (Oral)   Resp 16   Wt 106.3 kg (234 lb 5.6 oz)   BMI 31.57 kg/m                Laboratory Data:   BMPNo lab results found in last 7 days.  CBC  Recent Labs   Lab 08/09/24  1119 08/09/24  0901 08/07/24  1202   WBC 3.8*  --  6.1   RBC 3.87*  --  4.20*   HGB 10.8*  10.8* 10.3* 10.8*   HCT 32.1*  32.1* 31.3* 33.5*   MCV 84  --  80   MCH 28.7  --  25.7*   MCHC 34.4  --  32.2   RDW 16.5*  --  21.2*   *  --  503*              Procedure Summary:   A red blood cell exchange was performed using donor red blood cells as the replacement product.    Peripheral veins were used for access and allowed for appropriate flow during the procedure.  ACD-A was used for anticoagulation.   The patient's vital signs were stable throughout.  The patient tolerated the procedure.  See apheresis flowsheet for additional details.     Attestation:   I Spencer Harris MD was available by pager during the entire procedure. I have reviewed the chart and discussed the patient and current procedure with the apheresis nursing staff.    Spencer Harris MD   Division of Transfusion Medicine   Department of Laboratory " [place] [address]   Pager: [phone]

## 2024-08-12 ENCOUNTER — OFFICE VISIT (OUTPATIENT)
Dept: OPHTHALMOLOGY | Facility: CLINIC | Age: 29
End: 2024-08-12
Payer: COMMERCIAL

## 2024-08-12 DIAGNOSIS — D57.1 HB-SS DISEASE WITHOUT CRISIS (H): Primary | ICD-10-CM

## 2024-08-12 PROCEDURE — 99214 OFFICE O/P EST MOD 30 MIN: CPT

## 2024-08-12 PROCEDURE — 99213 OFFICE O/P EST LOW 20 MIN: CPT | Mod: 25

## 2024-08-12 PROCEDURE — 92235 FLUORESCEIN ANGRPH MLTIFRAME: CPT

## 2024-08-12 ASSESSMENT — VISUAL ACUITY
CORRECTION_TYPE: GLASSES
OS_CC: 20/50
OD_CC: 20/25
METHOD: SNELLEN - LINEAR
OS_CC+: -2
OS_PH_CC+: -1
OS_PH_CC: 20/40
OD_CC+: -2

## 2024-08-12 ASSESSMENT — REFRACTION_WEARINGRX
OS_CYLINDER: SPHERE
OD_SPHERE: -1.50
OD_AXIS: 060
OS_SPHERE: -0.25
OD_CYLINDER: +0.25

## 2024-08-12 ASSESSMENT — TONOMETRY
OS_IOP_MMHG: 16
OD_IOP_MMHG: 15
IOP_METHOD: TONOPEN

## 2024-08-12 NOTE — PROGRESS NOTES
CC: sickle cell retinopathy    HPI: Ricky Pak is a 27 y/o male with a history of sickle cell disease here for retinal follow up. Last eye exam was in 08/2022. He had a suspected CVA of the left eye at age 11. He has regained some of his vision in the left eye but still has an area of missing central vision. Right eye has not had similar incident and has good vision but he does feel it has become more blurry in the past few years. Glasses are about 2 years old. No questions or concerns today.    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, stable vision loss  Had a sickle cell crisis in Feb was hospitalized   OCT shows thinned retina OS which explains poor vision OS vs OD  Fa shows non perfusion in the periphery both eyes with no NVD/NVE, will observe for now  Follow-up in 4 months repeat FA transit OD , possible laser both eyes     # Myopia  Current glasses 2 years old, satisfied with visual correction  No new MRx today      Shyann Euceda MD     Medical Retina  [facility]       Attending Physician Attestation:  Complete documentation of historical and exam elements from today's encounter can be found in the full encounter summary report (not reduplicated in this progress note).  I personally obtained the chief complaint(s) and history of present illness.  I confirmed and edited as necessary the review of systems, past medical/surgical history, family history, social history, and examination findings as  documented by others; and I examined the patient myself.  I personally reviewed the relevant tests, images, and reports as documented above.  I formulated and edited as necessary the assessment and plan and discussed the findings and management plan with the patient and family. Shyann Euceda MD

## 2024-08-12 NOTE — NURSING NOTE
Chief Complaints and History of Present Illnesses   Patient presents with    Follow Up     Sickle cell disease without crisis     Chief Complaint(s) and History of Present Illness(es)       Follow Up              Laterality: both eyes    Course: stable    Associated symptoms: Negative for dryness, eye pain, flashes and floaters    Treatments tried: no treatments    Pain scale: 0/10    Comments: Sickle cell disease without crisis              Comments    He states that his vision has seemed stable in both eyes, since his very recent eye exam.  Patient denies having any eye discomfort.    Prabha Batista, COT 11:52 AM  August 12, 2024

## 2024-08-13 LAB
HGB S BLD QL: ABNORMAL
HGB S BLD QL: ABNORMAL
HGB S MFR BLD ELPH: 42.8 %
HGB S MFR BLD ELPH: 9.6 %

## 2024-08-28 ENCOUNTER — PATIENT OUTREACH (OUTPATIENT)
Dept: ONCOLOGY | Facility: CLINIC | Age: 29
End: 2024-08-28
Payer: COMMERCIAL

## 2024-08-28 NOTE — PROGRESS NOTES
[facility]: Cancer Care                                                                                          Completed chart audit to update Oncology Care Coordination enrollment status.  Reviewed POC and pt has appropriate follow up scheduled.       Signature:  Paula Viera RN

## 2024-09-19 LAB
ABO/RH(D): NORMAL
ANTIBODY SCREEN: NEGATIVE
SPECIMEN EXPIRATION DATE: NORMAL

## 2024-09-20 ENCOUNTER — LAB (OUTPATIENT)
Dept: LAB | Facility: CLINIC | Age: 29
End: 2024-09-20
Attending: PEDIATRICS
Payer: COMMERCIAL

## 2024-09-20 DIAGNOSIS — D57.1 HB-SS DISEASE WITHOUT CRISIS (H): ICD-10-CM

## 2024-09-20 DIAGNOSIS — Z86.73 HISTORY OF CVA (CEREBROVASCULAR ACCIDENT): ICD-10-CM

## 2024-09-20 LAB
BASOPHILS # BLD AUTO: 0.1 10E3/UL (ref 0–0.2)
BASOPHILS NFR BLD AUTO: 2 %
EOSINOPHIL # BLD AUTO: 0.5 10E3/UL (ref 0–0.7)
EOSINOPHIL NFR BLD AUTO: 6 %
ERYTHROCYTE [DISTWIDTH] IN BLOOD BY AUTOMATED COUNT: 20.5 % (ref 10–15)
HCT VFR BLD AUTO: 33 % (ref 40–53)
HGB BLD-MCNC: 10.8 G/DL (ref 13.3–17.7)
IMM GRANULOCYTES # BLD: 0 10E3/UL
IMM GRANULOCYTES NFR BLD: 0 %
LYMPHOCYTES # BLD AUTO: 2.4 10E3/UL (ref 0.8–5.3)
LYMPHOCYTES NFR BLD AUTO: 29 %
MCH RBC QN AUTO: 26.2 PG (ref 26.5–33)
MCHC RBC AUTO-ENTMCNC: 32.7 G/DL (ref 31.5–36.5)
MCV RBC AUTO: 80 FL (ref 78–100)
MONOCYTES # BLD AUTO: 0.9 10E3/UL (ref 0–1.3)
MONOCYTES NFR BLD AUTO: 10 %
NEUTROPHILS # BLD AUTO: 4.5 10E3/UL (ref 1.6–8.3)
NEUTROPHILS NFR BLD AUTO: 53 %
NRBC # BLD AUTO: 0.1 10E3/UL
NRBC BLD AUTO-RTO: 1 /100
PLATELET # BLD AUTO: 419 10E3/UL (ref 150–450)
RBC # BLD AUTO: 4.13 10E6/UL (ref 4.4–5.9)
WBC # BLD AUTO: 8.4 10E3/UL (ref 4–11)

## 2024-09-20 PROCEDURE — 85049 AUTOMATED PLATELET COUNT: CPT

## 2024-09-20 PROCEDURE — 86900 BLOOD TYPING SEROLOGIC ABO: CPT

## 2024-09-20 PROCEDURE — 86923 COMPATIBILITY TEST ELECTRIC: CPT

## 2024-09-20 PROCEDURE — 36415 COLL VENOUS BLD VENIPUNCTURE: CPT

## 2024-09-20 NOTE — NURSING NOTE
Chief Complaint   Patient presents with    Blood Draw     Blood draw,  by MA.     Deandra Truong MA

## 2024-09-21 RX ORDER — DIPHENHYDRAMINE HYDROCHLORIDE 50 MG/ML
50 INJECTION INTRAMUSCULAR; INTRAVENOUS
Status: CANCELLED | OUTPATIENT
Start: 2024-09-21

## 2024-09-21 RX ORDER — CALCIUM CARBONATE 500 MG/1
500 TABLET, CHEWABLE ORAL ONCE
Status: CANCELLED | OUTPATIENT
Start: 2024-09-21 | End: 2024-09-21

## 2024-09-21 RX ORDER — HYDROXYZINE HYDROCHLORIDE 25 MG/1
25 TABLET, FILM COATED ORAL SEE ADMIN INSTRUCTIONS
Status: CANCELLED | OUTPATIENT
Start: 2024-09-21

## 2024-09-21 RX ORDER — HYDROXYZINE HYDROCHLORIDE 50 MG/1
50 TABLET, FILM COATED ORAL SEE ADMIN INSTRUCTIONS
Status: CANCELLED | OUTPATIENT
Start: 2024-09-21

## 2024-09-21 RX ORDER — ASPIRIN 325 MG
325 TABLET ORAL ONCE
Status: CANCELLED | OUTPATIENT
Start: 2024-09-21

## 2024-09-21 RX ORDER — CALCIUM CARBONATE 500 MG/1
1000 TABLET, CHEWABLE ORAL
Status: CANCELLED | OUTPATIENT
Start: 2024-09-21

## 2024-09-23 ENCOUNTER — HOSPITAL ENCOUNTER (OUTPATIENT)
Dept: LAB | Facility: CLINIC | Age: 29
Discharge: HOME | End: 2024-09-23
Attending: PEDIATRICS | Admitting: PEDIATRICS
Payer: COMMERCIAL

## 2024-09-23 VITALS
WEIGHT: 241.4 LBS | RESPIRATION RATE: 20 BRPM | TEMPERATURE: 97.5 F | DIASTOLIC BLOOD PRESSURE: 82 MMHG | SYSTOLIC BLOOD PRESSURE: 121 MMHG | HEART RATE: 78 BPM | BODY MASS INDEX: 32.52 KG/M2

## 2024-09-23 LAB
BLD PROD TYP BPU: NORMAL
BLOOD COMPONENT TYPE: NORMAL
CODING SYSTEM: NORMAL
CROSSMATCH: NORMAL
HCT VFR BLD AUTO: 32.3 % (ref 40–53)
HCT VFR BLD AUTO: 32.9 % (ref 40–53)
HGB BLD-MCNC: 10.8 G/DL (ref 13.3–17.7)
HGB BLD-MCNC: 11 G/DL (ref 13.3–17.7)
ISSUE DATE AND TIME: NORMAL
UNIT ABO/RH: NORMAL
UNIT NUMBER: NORMAL
UNIT STATUS: NORMAL
UNIT TYPE ISBT: 5100
UNIT TYPE ISBT: 9500

## 2024-09-23 PROCEDURE — 36512 APHERESIS RBC: CPT

## 2024-09-23 PROCEDURE — 250N000009 HC RX 250

## 2024-09-23 PROCEDURE — 250N000013 HC RX MED GY IP 250 OP 250 PS 637

## 2024-09-23 PROCEDURE — P9016 RBC LEUKOCYTES REDUCED: HCPCS

## 2024-09-23 PROCEDURE — 83020 HEMOGLOBIN ELECTROPHORESIS: CPT

## 2024-09-23 PROCEDURE — 85014 HEMATOCRIT: CPT

## 2024-09-23 PROCEDURE — 36415 COLL VENOUS BLD VENIPUNCTURE: CPT

## 2024-09-23 RX ORDER — HYDROXYZINE HYDROCHLORIDE 25 MG/1
25 TABLET, FILM COATED ORAL SEE ADMIN INSTRUCTIONS
Status: DISCONTINUED | OUTPATIENT
Start: 2024-09-23 | End: 2024-09-23

## 2024-09-23 RX ORDER — HYDROXYZINE HYDROCHLORIDE 50 MG/1
50 TABLET, FILM COATED ORAL SEE ADMIN INSTRUCTIONS
Status: DISCONTINUED | OUTPATIENT
Start: 2024-09-23 | End: 2024-09-23

## 2024-09-23 RX ORDER — ASPIRIN 325 MG
325 TABLET ORAL ONCE
Status: COMPLETED | OUTPATIENT
Start: 2024-09-23 | End: 2024-09-23

## 2024-09-23 RX ADMIN — HYDROXYZINE HYDROCHLORIDE 25 MG: 25 TABLET, FILM COATED ORAL at 08:43

## 2024-09-23 RX ADMIN — ASPIRIN 325 MG ORAL TABLET 325 MG: 325 PILL ORAL at 08:42

## 2024-09-23 RX ADMIN — ANTICOAGULANT CITRATE DEXTROSE SOLUTION FORMULA A 667 ML: 12.25; 11; 3.65 SOLUTION INTRAVENOUS at 09:01

## 2024-09-23 NOTE — PROCEDURES
Transfusion Medicine Procedure    Ricky Pak MRN# [id number]   YOB: 1995 Age: 28 year old        Reason for consult: RBC exchange for sickle cell disease prophylaxis           Assessment and Plan:   Ricky Pak is a 28 year old male with sickle cell disease (SCD) who underwent maintenance RBC exchange. Peripheral veins were used for access.  Notes feeling well.  Denies nausea, vomiting, fevers, chills.  No other concerns.  He overall tolerated the procedure.    His SCD remains under good control with regular RBC exchange and the plan is to continue ~monthly RBC exchanges.            History of Present Illness:   Ricky Pak is a 28 year old male with a past medical history include gallstones (now post cholecystectomy), and SCD complicated by a CVA with macular infarct and iron overload secondary to repeated blood transfusions. He began apheresis-based RBC exchanges around 2013 following his issues with iron overload. These exchanges have done a good job preventing sickle cell crises.  When he is in the area, he will come and receive red blood cell exchanges here at the [facility].             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             Past Surgical History:     Past Surgical History:   Procedure Laterality Date    CHOLECYSTECTOMY N/A 2011    LIVER BIOPSY N/A 2007    iron overload monitoring    LIVER BIOPSY N/A 2008    monitoring for iron overload              Social History:     Social History     Tobacco Use    Smoking status: Never     Passive exposure: Never    Smokeless tobacco: Never   Substance Use Topics    Alcohol use: Yes     Alcohol/week: 2.0  "standard drinks of alcohol     Types: 2 Standard drinks or equivalent per week     Comment: \"A few beers every other weekend.\"             Immunizations:     Immunization History   Administered Date(s) Administered    COVID-19 MONOVALENT 12+ (Pfizer) 04/02/2021, 04/23/2021    HIB, Unspecified 01/17/1997, 03/17/1997, 05/15/1997, 03/25/1998    Influenza Vaccine >6 months,quad, PF 10/20/2014, 08/23/2019, 09/22/2020    Influenza Vaccine, 6+MO IM (QUADRIVALENT W/PRESERVATIVES) 10/13/2016    MMR 11/20/1997, 11/09/2000    Mantoux Tuberculin Skin Test 03/28/2017, 04/04/2017    Meningococcal ACWY (Menactra ) 12/12/2012    Meningococcal ACWY (Menveo ) 03/24/2008, 03/25/2013, 03/19/2018, 07/01/2024    Meningococcal B (Bexsero ) 08/15/2016, 09/15/2016    Pneumo Conj 13-V (2010&after) 08/19/2013    Pneumococcal 23 valent 12/08/2008, 03/06/2019, 02/28/2020    Poliovirus, inactivated (IPV) 01/17/1997, 03/17/1997, 05/15/1997, 11/09/2000    TDAP (Adacel,Boostrix) 05/09/2007, 12/12/2012    Varicella 07/13/1998, 03/16/2009             Allergies:     Allergies   Allergen Reactions    Diphenhydramine Hives             Medications:     No current outpatient medications on file.     No current facility-administered medications for this encounter.             Vital Signs:   Vitals were reviewed.  VSS except for BP. He was normotensive when his left arm was used.  We switched to monitoring his leg during the procedure and he appeared to be hypertensive throughout.  He was normotensive when we took a final reading using his left arm.            Data:      Blood type Rh(D)   O POS RH(D)   Date Value Ref Range Status   01/12/2021 Pos  Final         Last CBC:  Lab Results   Component Value Date    WBC 8.4 09/20/2024    HGB 10.8 (L) 09/23/2024    HCT 32.3 (L) 09/23/2024    MCV 80 09/20/2024     09/20/2024         Procedure Summary:   A red blood cell exchange was performed using donor red blood cells as the replacement product.  The RBCs " were HgbS-negative, <14 days old and matched for patient's Rh (CcDEe) and Angely.  Peripheral veins were used for access and allowed for appropriate flow during the procedure.  ACD-A was used for anticoagulation.   The patient's vital signs were stable throughout (see vital signs note above).  The patient tolerated the procedure.  See apheresis flowsheet for additional details.    Attestation: During the procedure the patient was directly seen and evaluated by me, Sofi Ochoa MD.  I have reviewed the chart and pertinent laboratory findings, and discussed the patient and the current procedure with the Apheresis nursing staff.    Sofi Ochoa MD  Transfusion Medicine Attending  Laboratory Medicine & Pathology

## 2024-09-23 NOTE — DISCHARGE INSTRUCTIONS
Apheresis Blood Donor Center Post Instructions  You may feel tired after your procedure today.   Please call your doctor if you have:  bleeding that doesn t stop, fever, pain where a needle or tube (catheter) was placed, seizures, trouble breathing, red urine, nausea or vomiting, other health concerns.     If your symptoms are severe, call [phone].  If your veins were used, keep the bandages on for 4 hours.  Avoid heavy lifting with your arms.  If bleeding occurs from these sites, apply firm pressure for 5-10 minutes.  Call your physician if bleeding continues.   If you get a bruise:  1)  Apply ice to the area intermittently for 10-15 minutes during the first 24 hours.  2)  Thereafter, apply intermittent warm moist heat for 10-15 minutes to the area.  3)  A rainbow of colors may occur for about 10 days.    If you get a bruise larger than 2-3 inches in diameter, redness, swelling, or pain where the needle was, or tingling in your fingers or arm, contact the Apheresis/Blood Donor Center @ [phone].     The Apheresis/Blood Donor Center is open Monday-Friday 7:30 a.m. to 5 p.m.  The phone number is [phone].  A Transfusion Medicine physician can be reached after 5:00 p.m. weekdays and on weekends /Holidays by calling [phone], and asking for the physician on call.      Red blood cell exchange:   If you received blood products (plasma or red blood cells) as part of your treatment, you need to be aware that transfusion reactions can occur up to several hours after they have been given to you.  Call your physician if you experience any symptoms in the next 48 hours, including breathing problems, rash, itching, hives, nausea or vomiting, fever or chills, blood in your urine or stools, or joint pain.  Please inform the Transfusion Medicine Physician by calling [phone] and asking for the physician on call.

## 2024-09-24 LAB
HGB S BLD QL: ABNORMAL
HGB S BLD QL: ABNORMAL
HGB S MFR BLD ELPH: 41.3 %
HGB S MFR BLD ELPH: 7.7 %

## 2024-10-17 LAB
ABO/RH(D): NORMAL
ANTIBODY SCREEN: NEGATIVE
SPECIMEN EXPIRATION DATE: NORMAL

## 2024-10-18 ENCOUNTER — LAB (OUTPATIENT)
Dept: LAB | Facility: CLINIC | Age: 29
End: 2024-10-18
Attending: PEDIATRICS
Payer: COMMERCIAL

## 2024-10-18 DIAGNOSIS — Z86.73 HISTORY OF CVA (CEREBROVASCULAR ACCIDENT): ICD-10-CM

## 2024-10-18 DIAGNOSIS — D57.1 HB-SS DISEASE WITHOUT CRISIS (H): ICD-10-CM

## 2024-10-18 LAB
BASOPHILS # BLD AUTO: 0.2 10E3/UL (ref 0–0.2)
BASOPHILS NFR BLD AUTO: 2 %
EOSINOPHIL # BLD AUTO: 0.6 10E3/UL (ref 0–0.7)
EOSINOPHIL NFR BLD AUTO: 7 %
ERYTHROCYTE [DISTWIDTH] IN BLOOD BY AUTOMATED COUNT: 22.3 % (ref 10–15)
HCT VFR BLD AUTO: 32.4 % (ref 40–53)
HGB BLD-MCNC: 10.8 G/DL (ref 13.3–17.7)
IMM GRANULOCYTES # BLD: 0 10E3/UL
IMM GRANULOCYTES NFR BLD: 0 %
LYMPHOCYTES # BLD AUTO: 2.7 10E3/UL (ref 0.8–5.3)
LYMPHOCYTES NFR BLD AUTO: 31 %
MCH RBC QN AUTO: 25.5 PG (ref 26.5–33)
MCHC RBC AUTO-ENTMCNC: 33.3 G/DL (ref 31.5–36.5)
MCV RBC AUTO: 77 FL (ref 78–100)
MONOCYTES # BLD AUTO: 1 10E3/UL (ref 0–1.3)
MONOCYTES NFR BLD AUTO: 12 %
NEUTROPHILS # BLD AUTO: 4.2 10E3/UL (ref 1.6–8.3)
NEUTROPHILS NFR BLD AUTO: 48 %
NRBC # BLD AUTO: 0.1 10E3/UL
NRBC BLD AUTO-RTO: 1 /100
PLATELET # BLD AUTO: 660 10E3/UL (ref 150–450)
RBC # BLD AUTO: 4.23 10E6/UL (ref 4.4–5.9)
WBC # BLD AUTO: 8.7 10E3/UL (ref 4–11)

## 2024-10-18 PROCEDURE — 86923 COMPATIBILITY TEST ELECTRIC: CPT

## 2024-10-18 PROCEDURE — 86901 BLOOD TYPING SEROLOGIC RH(D): CPT

## 2024-10-18 PROCEDURE — 85004 AUTOMATED DIFF WBC COUNT: CPT

## 2024-10-18 PROCEDURE — 86900 BLOOD TYPING SEROLOGIC ABO: CPT

## 2024-10-18 PROCEDURE — 36415 COLL VENOUS BLD VENIPUNCTURE: CPT

## 2024-10-20 RX ORDER — ASPIRIN 325 MG
325 TABLET ORAL ONCE
Status: CANCELLED | OUTPATIENT
Start: 2024-10-20 | End: 2024-10-20

## 2024-10-20 RX ORDER — HYDROXYZINE HYDROCHLORIDE 25 MG/1
25 TABLET, FILM COATED ORAL EVERY 6 HOURS PRN
Status: CANCELLED | OUTPATIENT
Start: 2024-10-20

## 2024-10-20 RX ORDER — CALCIUM CARBONATE 500 MG/1
1000 TABLET, CHEWABLE ORAL
Status: CANCELLED | OUTPATIENT
Start: 2024-10-20

## 2024-10-20 RX ORDER — HYDROXYZINE HYDROCHLORIDE 50 MG/1
50 TABLET, FILM COATED ORAL EVERY 6 HOURS PRN
Status: CANCELLED | OUTPATIENT
Start: 2024-10-20

## 2024-10-21 ENCOUNTER — HOSPITAL ENCOUNTER (OUTPATIENT)
Dept: LAB | Facility: CLINIC | Age: 29
Discharge: HOME | End: 2024-10-21
Attending: PEDIATRICS
Payer: COMMERCIAL

## 2024-10-21 ENCOUNTER — ONCOLOGY VISIT (OUTPATIENT)
Dept: ONCOLOGY | Facility: CLINIC | Age: 29
End: 2024-10-21
Attending: PEDIATRICS
Payer: COMMERCIAL

## 2024-10-21 ENCOUNTER — APPOINTMENT (OUTPATIENT)
Dept: LAB | Facility: CLINIC | Age: 29
End: 2024-10-21
Attending: PEDIATRICS
Payer: COMMERCIAL

## 2024-10-21 VITALS
DIASTOLIC BLOOD PRESSURE: 78 MMHG | HEART RATE: 79 BPM | SYSTOLIC BLOOD PRESSURE: 118 MMHG | WEIGHT: 238.98 LBS | BODY MASS INDEX: 32.19 KG/M2 | TEMPERATURE: 97.8 F | RESPIRATION RATE: 16 BRPM

## 2024-10-21 VITALS
DIASTOLIC BLOOD PRESSURE: 78 MMHG | BODY MASS INDEX: 32.37 KG/M2 | RESPIRATION RATE: 16 BRPM | WEIGHT: 238.98 LBS | HEART RATE: 79 BPM | SYSTOLIC BLOOD PRESSURE: 118 MMHG | HEIGHT: 72 IN | TEMPERATURE: 97.8 F

## 2024-10-21 DIAGNOSIS — D57.1 HB-SS DISEASE WITHOUT CRISIS (H): ICD-10-CM

## 2024-10-21 LAB
HCT VFR BLD AUTO: 32.8 % (ref 40–53)
HGB BLD-MCNC: 10.4 G/DL (ref 13.3–17.7)

## 2024-10-21 PROCEDURE — 99213 OFFICE O/P EST LOW 20 MIN: CPT | Mod: 25 | Performed by: PEDIATRICS

## 2024-10-21 PROCEDURE — 83020 HEMOGLOBIN ELECTROPHORESIS: CPT | Performed by: PATHOLOGY

## 2024-10-21 PROCEDURE — 36512 APHERESIS RBC: CPT

## 2024-10-21 PROCEDURE — P9016 RBC LEUKOCYTES REDUCED: HCPCS

## 2024-10-21 PROCEDURE — 99214 OFFICE O/P EST MOD 30 MIN: CPT | Performed by: PEDIATRICS

## 2024-10-21 PROCEDURE — 250N000011 HC RX IP 250 OP 636: Performed by: PEDIATRICS

## 2024-10-21 PROCEDURE — G2211 COMPLEX E/M VISIT ADD ON: HCPCS | Performed by: PEDIATRICS

## 2024-10-21 PROCEDURE — 90471 IMMUNIZATION ADMIN: CPT | Performed by: PEDIATRICS

## 2024-10-21 PROCEDURE — 250N000013 HC RX MED GY IP 250 OP 250 PS 637: Performed by: PATHOLOGY

## 2024-10-21 PROCEDURE — 85014 HEMATOCRIT: CPT | Performed by: PATHOLOGY

## 2024-10-21 PROCEDURE — 36415 COLL VENOUS BLD VENIPUNCTURE: CPT | Performed by: PATHOLOGY

## 2024-10-21 PROCEDURE — 90715 TDAP VACCINE 7 YRS/> IM: CPT | Performed by: PEDIATRICS

## 2024-10-21 RX ORDER — HYDROMORPHONE HYDROCHLORIDE 4 MG/1
TABLET ORAL
COMMUNITY
Start: 2024-02-23 | End: 2024-10-21

## 2024-10-21 RX ORDER — ASPIRIN 325 MG
325 TABLET ORAL ONCE
Status: COMPLETED | OUTPATIENT
Start: 2024-10-21 | End: 2024-10-21

## 2024-10-21 RX ORDER — HYDROXYZINE HYDROCHLORIDE 25 MG/1
25 TABLET, FILM COATED ORAL EVERY 6 HOURS PRN
Status: DISCONTINUED | OUTPATIENT
Start: 2024-10-21 | End: 2024-10-22 | Stop reason: HOSPADM

## 2024-10-21 RX ORDER — HYDROXYZINE HYDROCHLORIDE 50 MG/1
50 TABLET, FILM COATED ORAL EVERY 6 HOURS PRN
Status: DISCONTINUED | OUTPATIENT
Start: 2024-10-21 | End: 2024-10-22 | Stop reason: HOSPADM

## 2024-10-21 RX ADMIN — ASPIRIN 325 MG ORAL TABLET 325 MG: 325 PILL ORAL at 08:36

## 2024-10-21 RX ADMIN — TETANUS TOXOID, REDUCED DIPHTHERIA TOXOID AND ACELLULAR PERTUSSIS VACCINE, ADSORBED 0.5 ML: 5; 2.5; 8; 8; 2.5 SUSPENSION INTRAMUSCULAR at 12:54

## 2024-10-21 RX ADMIN — HYDROXYZINE HYDROCHLORIDE 25 MG: 25 TABLET, FILM COATED ORAL at 08:35

## 2024-10-21 ASSESSMENT — PAIN SCALES - GENERAL: PAINLEVEL: NO PAIN (0)

## 2024-10-21 NOTE — LETTER
10/21/2024      Ricky Pak  [address] [facility] [address]      Dear Colleague,    Thank you for referring your patient, Ricky Pak, to the [facility]. Please see a copy of my visit note below.    Adult Sickle Cell Outpatient Clinic Note        Date of Visit: 10/21/2024    Ricky Pak is a 28 year old male who is being seen as a follow up for SCD care. He has chosen to continue care in our system although his work schedule requires he travel every few months.  He is seen today for routine clinic follow-up. His apheresis visit was today    Interval History  Ricky has been doing well since the last visit.  He has not had any emergency department visits.  He did make it to the eye doctor in August of this year.  He said that he did not get any new prescription, but the ophthalmologist did note a bit of neovascularization and may need to do some laser treatments later this year.  He has a repeat visit in December.  Otherwise, he notes that his father was just diagnosed with lymphoma last week, so he is trying to get back to see him in mid November.  His current contract and is on November 8.  He will be going to [facility] in [place] to work from November 18 through mid February.  The week before that, he is planning to move down there but now also has to work in a visit to his father back in [place].    When I asked him about his a pheresis plans, he prefers to keep getting them here.  He said that because it is in the [place], and its only 1 time a month, he thinks he can get back.  He will be back here in mid December for his eye appointment and wants to work it up then.  In the meantime, he hopes to get a transfusion before he starts his work in [place] as well but needs to work it out with his trips back to [place].  Otherwise, no new developments in his life that he wants to report.  He is very happy and has been excessively pleased by  his care here.        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 He was on monthly transfusions from 3813-4542 with chelation and in 2013 he was switched to peripheral RBC exchange which has kept him in great health since then. He no longer needs chelation and is able to keep his HbS ~30% or below with regularity. He grew up in [place] and has spent most of his life in the [facility]. 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 He just got sent to [facility] and moved here one week ago. He denies regular pain and has not had a pain crisis since he was a teenager. He has kept up with his exchange transfusions and feels quite healthy overall. He regained the vision after having a macular infarct at age 10 and has no further neurologic symptoms since. He does not take any regular medications and has not had any opioids in many years either. He has no complaints today. He met with the apheresis team before this visit and is squared away to be maintained on his exchange schedule here.    ---------------------------------------  Ricky Pak's Goals (last discussed 7/1/24)    1-3 month goal:      6 month goal:      12 month goal:  Possibly look for a place to settle down permanently     Disease-specific goal(s):  -Dental follow-up  -Maintain good exchange regimens despite moving about the country. Plans to continue to return to [place] monthly if out of the state  -Needs ophthalmology follow up  ---------------------------------------      Sickle Cell Disease Comprehensive Checklist  Bone Health/Avascular Necrosis Screening/Symptoms (each visit):  none  Leg Ulcer evaluation (every visit):  none  Hypertension (every visit): None  Last ophthalmologic exam: August 2022  Last urinalysis for microalbuminuria/CKD (annually): 7/1/24, microalbumin still present but lower  Last pulmonary evaluation (asthma, WILFREDO, pulm HTN): unknown, no current concerns  Stroke/silent cerebral infarct Hx (Y/N): macular infarct ~2006, vision now normalized, due for adult eye referral  Last PCP Visit: No established PCP  Vaccines:   MenACWY given 7/1/24. Needs MenB booster. PPSV23 in 2025.    Plan last reviewed with patient: 7/1/24    Patient background: 27 yo male originally from [place], [place] who moved to [place] in late 2020. 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 No children or significant others    Sickle Cell Disease History  Primary Hematologist: Janiya  Genotype: SS  Acute Pain Crisis Treatment: (Opioid-naive)  ER/Acute Care/Infusion Clinic:   Morphine 1 mg IVP/SC Q1H X 3 doses  Toradol 30 mg IV x1  LR 1 L  Other: Zofran 8 mg IV  Inpatient:  Opioid: Morphine 1 mg IV Q1H PRN until PCA starts  Toradol 30 mg q6h x 3-4 days  PCA plan:  PCA button dose: Morphine 1 mg  Lockout: 20 minutes  Continuous Infusion: consider 0.5 mg/hr  Other Medications: Robaxin PRN, Zofran  Supportive Care: Docusate, Senna  Chronic Pain Medications:  none  Baseline Hemoglobin: 12 mg/dl  Hydroxyurea use: no  H/O blood transfusions: Yes (partial exchange since 2013, full transfusion protocol 2006-13, now fully chelated)  H/O Transfusion Reactions: no  Antibodies: none known  H/O Acute Chest Syndrome: none  Last episode: n/a  ICU/intubation: no  H/O Stroke: Yes (macular infarct ~2006, vision normalized)  H/O VTE: no  H/O Cholecystectomy or Splenectomy: Cholecystectomy (2011)  H/O Asthma, Pulm HTN, AVN, Leg Ulcers, Nephropathy, Retinopathy, etc: none      Review Of Systems:   ROS: 10 point ROS neg other than the symptoms noted above in the HPI.    Past Medical History:   Past Medical History:   Diagnosis Date      "Gallstones 2011    s/p cholecystectomy     Hb-SS disease without crisis (H)      History of CVA (cerebrovascular accident) 07/2006    macular infarct, but regained vision     Iron overload due to repeated red blood cell transfusions 2013    s/p chelation     Sleep apnea     Treated with CPAP.       Past Surgical History:  Past Surgical History:   Procedure Laterality Date     CHOLECYSTECTOMY N/A 2011     LIVER BIOPSY N/A 2007    iron overload monitoring     LIVER BIOPSY N/A 2008    monitoring for iron overload       Past Family History:   Family History   Problem Relation Age of Onset     Sickle Cell Trait Mother      Sickle Cell Trait Father      Breast Cancer Maternal Grandmother      Sickle Cell Trait Brother      No Known Problems Sister      No Known Problems Sister      Breast Cancer Maternal Great-Grandmother      Glaucoma No family hx of      Macular Degeneration No family hx of        Social History:   Social History     Socioeconomic History     Marital status: Single     Spouse name: Not on file     Number of children: Not on file     Years of education: Not on file     Highest education level: Not on file   Occupational History     Not on file   Tobacco Use     Smoking status: Never     Passive exposure: Never     Smokeless tobacco: Never   Substance and Sexual Activity     Alcohol use: Yes     Alcohol/week: 2.0 standard drinks of alcohol     Types: 2 Standard drinks or equivalent per week     Comment: \"A few beers every other weekend.\"     Drug use: Never     Sexual activity: Not on file   Other Topics Concern     Not on file   Social History Narrative    Moved to [place] in 2020. Originally from [place]. 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     Social Determinants of Health     Financial Resource Strain: Low Risk  (2/28/2024)    [facility]    Overall Financial Resource Strain (CARDIA)      Difficulty of Paying Living Expenses: Not " very hard   Food Insecurity: No Food Insecurity (2/20/2024)    Received from [facility]    Hunger Vital Sign      Worried About Running Out of Food in the Last Year: Never true      Ran Out of Food in the Last Year: Never true   Transportation Needs: No Transportation Needs (2/20/2024)    Received from [facility]    PRAPARE - Transportation      Lack of Transportation (Medical): No      Lack of Transportation (Non-Medical): No   Physical Activity: Sufficiently Active (2/28/2024)    Received from [facility]    Exercise Vital Sign      Days of Exercise per Week: 2 days      Minutes of Exercise per Session: 120 min   Stress: No Stress Concern Present (2/28/2024)    Received from [facility]    [employer] [place] [employer] Occupational [employer] - Occupational Stress Questionnaire      Feeling of Stress : Not at all   Social Connections: Unknown (2/28/2024)    [facility]    Social Connection and Isolation Panel [NHANES]      Frequency of Communication with Friends and Family: Twice a week      Frequency of Social Gatherings with Friends and Family: Not on file      Attends Sikh Services: Never      Active Member of Clubs or Organizations: No      Attends Club or Organization Meetings: Never      Marital Status: Never    Interpersonal Safety: Not At Risk (2/20/2024)    Received from [facility]    Humiliation, Afraid, Rape, and Kick questionnaire      Fear of Current or Ex-Partner: No      Emotionally Abused: No      Physically Abused: No      Sexually Abused: No   Housing Stability: Unknown (2/20/2024)    [facility]    Housing Stability Vital Sign      Unable to Pay for Housing in the Last Year: No      Number of Places Lived in the Last Year: Not on file      In the last 12 months, was there a time when you did not have a steady place to sleep or slept in a shelter  "(including now)?: No       Current Medications:    No current outpatient medications on file.     No current facility-administered medications for this visit.     Facility-Administered Medications Ordered in Other Visits   Medication Dose Route Frequency Provider Last Rate Last Admin     Anticoagulant Citrate Dextrose Formula A   (Apheresis Center)   Apheresis During Apheresis (from stock) Spencer Harris MD         hydrOXYzine HCl (ATARAX) tablet 25 mg  25 mg Oral Q6H PRN Spencer Harris MD   25 mg at 10/21/24 0835    Or     hydrOXYzine HCl (ATARAX) tablet 50 mg  50 mg Oral Q6H PRN Spencer Harris MD           Physical Exam:  /78   Pulse 79   Temp 97.8  F (36.6  C) (Temporal)   Resp 16   Ht 1.83 m (6' 0.05\")   Wt 108.4 kg (238 lb 15.7 oz)   BMI 32.37 kg/m      General: Well-appearing male, NAD, smiling and happy  Eyes: EOMI, PERRL. No scleral icterus. Wearing glasses  Respiratory: breathing comfortably on room air.  Neurologic: Grossly nonfocal.   Skin: No rashes, petechiae, or bruising noted on exposed skin.    Labs:  Recent Results (from the past 24 hour(s))   Hemoglobin and hematocrit    Collection Time: 10/21/24  9:07 AM   Result Value Ref Range    Hemoglobin 10.4 (L) 13.3 - 17.7 g/dL    Hematocrit 32.8 (L) 40.0 - 53.0 %         Assessment:   Ricky Pak is a 28 year old male with HbSS Disease and a history of macular infarct who has elected to continue to receive sickle cell care in [place] routinely. He continues to travel for work every few months, last in [place] in winter 2024, but back in [place].  He is headed to [place] next month for 3-month stint.  He wants to keep doing his a pheresis here but hopes to be able to do his first one a week early in November.  I will reach out to apheresis to see if we can work that out on November 11.  Then he can come back in mid-December.  He has been able to keep this type of schedule going for many years and coordinates this schedule himself.    He will " continue to follow-up with the ophthalmologist in December.    HbSS  -continue exchange transfusions (today and then mid-Nov)    History of macular infarct  -Exchange transfusions per stroke protocol (DOES NOT HAVE PORT--STILL DOES PERIPHERAL ACCESS)  -Ferritin checked in summer 2024 and was within normal limits  -Still with mild blurriness. May have laser treatments in 2 months    Slight proteinuria  -Now seen on repeat UA, though low level  -Continue monitoring UA each visit for now. Low threshold to start lisinopril, with consideration of nephrology consult down the line.  -UA/urine microalbumin testing next visit    Healthcare maintenance  He will continue to seek out dental evaluation.   -TDAP given today. Needs MenB at next visit        Daily Plan:  1) Labs: labs with apheresis. Ordering urine microalbumin testing for next visit.  2) Orders: none new  3) Follow up: December 2024    35 minutes spent on the date of the encounter doing chart review, review of test results, patient visit, and documentation     Manny Denson MD  Classical Hematologist  Division of Hematology, Oncology, and Transplantation  [facility]  [address] [place]  Pager: [phone]    The longitudinal plan of care for the diagnosis(es)/condition(s) as documented were addressed during this visit. Due to the added complexity in care, I will continue to support Ricky in the subsequent management and with ongoing continuity of care.        Again, thank you for allowing me to participate in the care of your patient.        Sincerely,        Manny Denson MD

## 2024-10-21 NOTE — PROCEDURES
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  He is tolerating the procedure well.    His SCD remains under good control with regular RBC exchange and the plan is to continue ~monthly RBC exchanges.          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 He was on monthly transfusions from 3057-1676 with chelation and in 2013 he was switched to peripheral RBC exchange which has kept him in good health . He no longer needs chelation and is able to keep his HbS ~30% or below with regularity. He grew up in [place] and has spent most of his life in the [facility]. 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 When he is in the area, he will come and receive red blood cell exchanges here at the [facility].       He did have a hypotensive episode following a previous apheresis procedure here which was a depletion followed by an exchange, so we have avoided depletion-exchanges and gone with conventional RBC exchanges              Past Medical History:     Past Medical History:   Diagnosis Date    Gallstones 2011    s/p cholecystectomy    Hb-SS disease without crisis (H)     History of CVA (cerebrovascular accident) 07/2006    macular infarct, but regained vision    Iron overload due to repeated red blood cell transfusions 2013    s/p chelation    Sleep  "apnea     Treated with CPAP.             Past Surgical History:     Past Surgical History:   Procedure Laterality Date    CHOLECYSTECTOMY N/A 2011    LIVER BIOPSY N/A 2007    iron overload monitoring    LIVER BIOPSY N/A 2008    monitoring for iron overload              Social History:     Social History     Tobacco Use    Smoking status: Never     Passive exposure: Never    Smokeless tobacco: Never   Substance Use Topics    Alcohol use: Yes     Alcohol/week: 2.0 standard drinks of alcohol     Types: 2 Standard drinks or equivalent per week     Comment: \"A few beers every other weekend.\"             Immunizations:     Immunization History   Administered Date(s) Administered    COVID-19 MONOVALENT 12+ (Pfizer) 04/02/2021, 04/23/2021    HIB, Unspecified 01/17/1997, 03/17/1997, 05/15/1997, 03/25/1998    Influenza Vaccine >6 months,quad, PF 10/20/2014, 08/23/2019, 09/22/2020    Influenza Vaccine, 6+MO IM (QUADRIVALENT W/PRESERVATIVES) 10/13/2016    MMR 11/20/1997, 11/09/2000    Mantoux Tuberculin Skin Test 03/28/2017, 04/04/2017    Meningococcal ACWY (Menactra ) 12/12/2012    Meningococcal ACWY (Menveo ) 03/24/2008, 03/25/2013, 03/19/2018, 07/01/2024    Meningococcal B (Bexsero ) 08/15/2016, 09/15/2016    Pneumo Conj 13-V (2010&after) 08/19/2013    Pneumococcal 23 valent 12/08/2008, 03/06/2019, 02/28/2020    Poliovirus, inactivated (IPV) 01/17/1997, 03/17/1997, 05/15/1997, 11/09/2000    TDAP (Adacel,Boostrix) 05/09/2007, 12/12/2012    Varicella 07/13/1998, 03/16/2009             Allergies:     Allergies   Allergen Reactions    Diphenhydramine Hives             Medications:     No current outpatient medications on file.     Current Facility-Administered Medications   Medication Dose Route Frequency Provider Last Rate Last Admin    Anticoagulant Citrate Dextrose Formula A   (Apheresis Center)   Apheresis During Apheresis (from stock) Spencer Harris MD        hydrOXYzine HCl (ATARAX) tablet 25 mg  25 mg Oral Q6H PRN Steven, " Spencer ARCINIEGA MD   25 mg at 10/21/24 0835    Or    hydrOXYzine HCl (ATARAX) tablet 50 mg  50 mg Oral Q6H PRN Spencer Harris MD                 Vital Signs:   BP (!) 163/91   Pulse 61   Temp 97.8  F (36.6  C) (Temporal)   Resp 16   Wt 108.4 kg (238 lb 15.7 oz)   BMI 32.19 kg/m     Patient Alert & Oriented and in no apparent distress  Breathing appears comfortable on room air  Peripheral veins were accessed for the procedure             Data:      Blood type Rh(D)   O POS RH(D)   Date Value Ref Range Status   01/12/2021 Pos  Final         Last CBC:  Lab Results   Component Value Date    WBC 8.7 10/18/2024    HGB 10.4 (L) 10/21/2024    HCT 32.8 (L) 10/21/2024    MCV 77 (L) 10/18/2024     (H) 10/18/2024         Procedure Summary:   A red blood cell exchange was performed using ~15 units of donor red blood cells as replacement. The RBCs were HgbS-negative and also matched for patient's Rh (CcDEe) and Jeff type.    Peripheral veins were used for access and allowed for appropriate flow during the procedure.  ACD-A was used for anticoagulation.         Attestation:   During the procedure the patient was directly seen and evaluated by me, Spencer Harris MD.  I have reviewed the chart and pertinent laboratory findings, and discussed the patient and the current procedure with the Apheresis nursing staff.   Spencer Harris MD  Transfusion Medicine Attending  Division of Transfusion Medicine   Department of Laboratory Medicine & [place] [address]   Pager: [phone]

## 2024-10-21 NOTE — DISCHARGE INSTRUCTIONS
Red blood cell exchange:   If you received blood products (plasma or red blood cells) as part of your treatment, you need to be aware that transfusion reactions can occur up to several hours after they have been given to you.  Call your physician if you experience any symptoms in the next 48 hours, including breathing problems, rash, itching, hives, nausea or vomiting, fever or chills, blood in your urine or stools, or joint pain.  Please inform the Transfusion Medicine Physician by calling [phone] and asking for the physician on call.

## 2024-10-21 NOTE — PROGRESS NOTES
Adult Sickle Cell Outpatient Clinic Note        Date of Visit: 10/21/2024    Ricky Pak is a 28 year old male who is being seen as a follow up for SCD care. He has chosen to continue care in our system although his work schedule requires he travel every few months.  He is seen today for routine clinic follow-up. His apheresis visit was today    Interval History  Ricky has been doing well since the last visit.  He has not had any emergency department visits.  He did make it to the eye doctor in August of this year.  He said that he did not get any new prescription, but the ophthalmologist did note a bit of neovascularization and may need to do some laser treatments later this year.  He has a repeat visit in December.  Otherwise, he notes that his father was just diagnosed with lymphoma last week, so he is trying to get back to see him in mid November.  His current contract and is on November 8.  He will be going to [facility] in [place] to work from November 18 through mid February.  The week before that, he is planning to move down there but now also has to work in a visit to his father back in [place].    When I asked him about his a pheresis plans, he prefers to keep getting them here.  He said that because it is in the [place], and its only 1 time a month, he thinks he can get back.  He will be back here in mid December for his eye appointment and wants to work it up then.  In the meantime, he hopes to get a transfusion before he starts his work in [place] as well but needs to work it out with his trips back to [place].  Otherwise, no new developments in his life that he wants to report.  He is very happy and has been excessively pleased by his care here.        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 He was on monthly  transfusions from 2863-0660 with chelation and in 2013 he was switched to peripheral RBC exchange which has kept him in great health since then. He no longer needs chelation and is able to keep his HbS ~30% or below with regularity. He grew up in [place] and has spent most of his life in the [facility]. 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 He just got sent to [facility] [place] and moved here one week ago. He denies regular pain and has not had a pain crisis since he was a teenager. He has kept up with his exchange transfusions and feels quite healthy overall. He regained the vision after having a macular infarct at age 10 and has no further neurologic symptoms since. He does not take any regular medications and has not had any opioids in many years either. He has no complaints today. He met with the apheresis team before this visit and is squared away to be maintained on his exchange schedule here.    ---------------------------------------  Ricky Pak's Goals (last discussed 7/1/24)    1-3 month goal:      6 month goal:      12 month goal:  Possibly look for a place to settle down permanently     Disease-specific goal(s):  -Dental follow-up  -Maintain good exchange regimens despite moving about the country. Plans to continue to return to [place] monthly if out of the state  -Needs ophthalmology follow up  ---------------------------------------      Sickle Cell Disease Comprehensive Checklist  Bone Health/Avascular Necrosis Screening/Symptoms (each visit):  none  Leg Ulcer evaluation (every visit): none  Hypertension (every visit): None  Last ophthalmologic exam: August 2022  Last urinalysis for microalbuminuria/CKD (annually): 7/1/24, microalbumin still present but lower  Last pulmonary evaluation (asthma, WILFREDO, pulm HTN): unknown, no current  concerns  Stroke/silent cerebral infarct Hx (Y/N): macular infarct ~2006, vision now normalized, due for adult eye referral  Last PCP Visit: No established PCP  Vaccines:   MenACWY given 7/1/24. Needs MenB booster. PPSV23 in 2025.    Plan last reviewed with patient: 7/1/24    Patient background: 27 yo male originally from [place], [place] who moved to [place] in late 2020. 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 No children or significant others    Sickle Cell Disease History  Primary Hematologist: Janiya  Genotype: SS  Acute Pain Crisis Treatment: (Opioid-naive)  ER/Acute Care/Infusion Clinic:   Morphine 1 mg IVP/SC Q1H X 3 doses  Toradol 30 mg IV x1  LR 1 L  Other: Zofran 8 mg IV  Inpatient:  Opioid: Morphine 1 mg IV Q1H PRN until PCA starts  Toradol 30 mg q6h x 3-4 days  PCA plan:  PCA button dose: Morphine 1 mg  Lockout: 20 minutes  Continuous Infusion: consider 0.5 mg/hr  Other Medications: Robaxin PRN, Zofran  Supportive Care: Docusate, Senna  Chronic Pain Medications:  none  Baseline Hemoglobin: 12 mg/dl  Hydroxyurea use: no  H/O blood transfusions: Yes (partial exchange since 2013, full transfusion protocol 2006-13, now fully chelated)  H/O Transfusion Reactions: no  Antibodies: none known  H/O Acute Chest Syndrome: none  Last episode: n/a  ICU/intubation: no  H/O Stroke: Yes (macular infarct ~2006, vision normalized)  H/O VTE: no  H/O Cholecystectomy or Splenectomy: Cholecystectomy (2011)  H/O Asthma, Pulm HTN, AVN, Leg Ulcers, Nephropathy, Retinopathy, etc: none      Review Of Systems:   ROS: 10 point ROS neg other than the symptoms noted above in the HPI.    Past Medical History:   Past Medical History:   Diagnosis Date    Gallstones 2011    s/p cholecystectomy    Hb-SS disease without crisis (H)     History of CVA (cerebrovascular accident) 07/2006    macular infarct, but regained vision    Iron overload due to repeated red blood cell transfusions 2013    s/p chelation     "Sleep apnea     Treated with CPAP.       Past Surgical History:  Past Surgical History:   Procedure Laterality Date    CHOLECYSTECTOMY N/A 2011    LIVER BIOPSY N/A 2007    iron overload monitoring    LIVER BIOPSY N/A 2008    monitoring for iron overload       Past Family History:   Family History   Problem Relation Age of Onset    Sickle Cell Trait Mother     Sickle Cell Trait Father     Breast Cancer Maternal Grandmother     Sickle Cell Trait Brother     No Known Problems Sister     No Known Problems Sister     Breast Cancer Maternal Great-Grandmother     Glaucoma No family hx of     Macular Degeneration No family hx of        Social History:   Social History     Socioeconomic History    Marital status: Single     Spouse name: Not on file    Number of children: Not on file    Years of education: Not on file    Highest education level: Not on file   Occupational History    Not on file   Tobacco Use    Smoking status: Never     Passive exposure: Never    Smokeless tobacco: Never   Substance and Sexual Activity    Alcohol use: Yes     Alcohol/week: 2.0 standard drinks of alcohol     Types: 2 Standard drinks or equivalent per week     Comment: \"A few beers every other weekend.\"    Drug use: Never    Sexual activity: Not on file   Other Topics Concern    Not on file   Social History Narrative    Moved to [place] in 2020. Originally from [place]. 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     Social Determinants of Health     Financial Resource Strain: Low Risk  (2/28/2024)    [facility]    Overall Financial Resource Strain (CARDIA)     Difficulty of Paying Living Expenses: Not very hard   Food Insecurity: No Food Insecurity (2/20/2024)    Received from [facility]    Hunger Vital Sign     Worried About Running Out of Food in the Last Year: Never true     Ran Out of Food in the Last Year: Never true   Transportation Needs: No " Transportation Needs (2/20/2024)    Received from  [facility]    PRAPARE - Transportation     Lack of Transportation (Medical): No     Lack of Transportation (Non-Medical): No   Physical Activity: Sufficiently Active (2/28/2024)    Received from [facility]    Exercise Vital Sign     Days of Exercise per Week: 2 days     Minutes of Exercise per Session: 120 min   Stress: No Stress Concern Present (2/28/2024)    Received from [facility]    [place] [employer] Occupational Health - Occupational Stress Questionnaire     Feeling of Stress : Not at all   Social Connections: Unknown (2/28/2024)    [facility]    Social Connection and Isolation Panel [NHANES]     Frequency of Communication with Friends and Family: Twice a week     Frequency of Social Gatherings with Friends and Family: Not on file     Attends Mu-ism Services: Never     Active Member of Clubs or Organizations: No     Attends Club or Organization Meetings: Never     Marital Status: Never    Interpersonal Safety: Not At Risk (2/20/2024)    Received from [facility]    Humiliation, Afraid, Rape, and Kick questionnaire     Fear of Current or Ex-Partner: No     Emotionally Abused: No     Physically Abused: No     Sexually Abused: No   Housing Stability: Unknown (2/20/2024)    [facility]    Housing Stability Vital Sign     Unable to Pay for Housing in the Last Year: No     Number of Places Lived in the Last Year: Not on file     In the last 12 months, was there a time when you did not have a steady place to sleep or slept in a shelter (including now)?: No       Current Medications:    No current outpatient medications on file.     No current facility-administered medications for this visit.     Facility-Administered Medications Ordered in Other Visits   Medication Dose Route Frequency Provider Last Rate Last Admin     "Anticoagulant Citrate Dextrose Formula A   (Apheresis Center)   Apheresis During Apheresis (from stock) Spencer Harris MD        hydrOXYzine HCl (ATARAX) tablet 25 mg  25 mg Oral Q6H PRN Spencer Harris MD   25 mg at 10/21/24 0835    Or    hydrOXYzine HCl (ATARAX) tablet 50 mg  50 mg Oral Q6H PRN Spencer Harris MD           Physical Exam:  /78   Pulse 79   Temp 97.8  F (36.6  C) (Temporal)   Resp 16   Ht 1.83 m (6' 0.05\")   Wt 108.4 kg (238 lb 15.7 oz)   BMI 32.37 kg/m      General: Well-appearing male, NAD, smiling and happy  Eyes: EOMI, PERRL. No scleral icterus. Wearing glasses  Respiratory: breathing comfortably on room air.  Neurologic: Grossly nonfocal.   Skin: No rashes, petechiae, or bruising noted on exposed skin.    Labs:  Recent Results (from the past 24 hour(s))   Hemoglobin and hematocrit    Collection Time: 10/21/24  9:07 AM   Result Value Ref Range    Hemoglobin 10.4 (L) 13.3 - 17.7 g/dL    Hematocrit 32.8 (L) 40.0 - 53.0 %         Assessment:   Ricky Pak is a 28 year old male with HbSS Disease and a history of macular infarct who has elected to continue to receive sickle cell care in [place] routinely. He continues to travel for work every few months, last in [place] in winter 2024, but back in [place].  He is headed to [place] next month for 3-month stint.  He wants to keep doing his a pheresis here but hopes to be able to do his first one a week early in November.  I will reach out to apheresis to see if we can work that out on November 11.  Then he can come back in mid-December.  He has been able to keep this type of schedule going for many years and coordinates this schedule himself.    He will continue to follow-up with the ophthalmologist in December.    HbSS  -continue exchange transfusions (today and then mid-Nov)    History of macular infarct  -Exchange transfusions per stroke protocol (DOES NOT HAVE PORT--STILL DOES PERIPHERAL ACCESS)  -Ferritin checked in summer 2024 and was " within normal limits  -Still with mild blurriness. May have laser treatments in 2 months    Slight proteinuria  -Now seen on repeat UA, though low level  -Continue monitoring UA each visit for now. Low threshold to start lisinopril, with consideration of nephrology consult down the line.  -UA/urine microalbumin testing next visit    Healthcare maintenance  He will continue to seek out dental evaluation.   -TDAP given today. Needs MenB at next visit        Daily Plan:  1) Labs: labs with apheresis. Ordering urine microalbumin testing for next visit.  2) Orders: none new  3) Follow up: December 2024    35 minutes spent on the date of the encounter doing chart review, review of test results, patient visit, and documentation     Manny Denson MD  Classical Hematologist  Division of Hematology, Oncology, and Transplantation  [facility]  [employer] [place]  Pager: [phone]    The longitudinal plan of care for the diagnosis(es)/condition(s) as documented were addressed during this visit. Due to the added complexity in care, I will continue to support Ricky in the subsequent management and with ongoing continuity of care.

## 2024-10-21 NOTE — NURSING NOTE
"Oncology Rooming Note    October 21, 2024 12:14 PM   Ricky Pak is a 28 year old male who presents for:    Chief Complaint   Patient presents with    Oncology Clinic Visit     Hb-SS disease without crisis     Initial Vitals: /78   Pulse 79   Temp 97.8  F (36.6  C) (Temporal)   Resp 16   Ht 1.83 m (6' 0.05\")   Wt 108.4 kg (238 lb 15.7 oz)   BMI 32.37 kg/m   Estimated body mass index is 32.37 kg/m  as calculated from the following:    Height as of this encounter: 1.83 m (6' 0.05\").    Weight as of this encounter: 108.4 kg (238 lb 15.7 oz). Body surface area is 2.35 meters squared.  No Pain (0) Comment: Data Unavailable   No LMP for male patient.  Allergies reviewed: Yes  Medications reviewed: Yes    Medications: Medication refills not needed today.  Pharmacy name entered into [facility]: [place], [place] - [address] [place] [address] [phone]    Frailty Screening:   Is the patient here for a new oncology consult visit in cancer care? 2. No      Clinical concerns: Patient states no new concerns to discuss with provider.       Fiordaliza Bruce EMT            "

## 2024-10-22 LAB
HGB S BLD QL: ABNORMAL
HGB S MFR BLD ELPH: 36.4 %

## 2024-11-10 LAB
ABO/RH(D): NORMAL
ANTIBODY SCREEN: NEGATIVE
SPECIMEN EXPIRATION DATE: NORMAL

## 2024-11-11 ENCOUNTER — LAB (OUTPATIENT)
Dept: LAB | Facility: CLINIC | Age: 29
End: 2024-11-11
Attending: PEDIATRICS
Payer: COMMERCIAL

## 2024-11-11 DIAGNOSIS — Z86.73 HISTORY OF CVA (CEREBROVASCULAR ACCIDENT): ICD-10-CM

## 2024-11-11 DIAGNOSIS — D57.1 HB-SS DISEASE WITHOUT CRISIS (H): ICD-10-CM

## 2024-11-11 LAB
BASOPHILS # BLD AUTO: 0.2 10E3/UL (ref 0–0.2)
BASOPHILS NFR BLD AUTO: 2 %
EOSINOPHIL # BLD AUTO: 0.5 10E3/UL (ref 0–0.7)
EOSINOPHIL NFR BLD AUTO: 7 %
ERYTHROCYTE [DISTWIDTH] IN BLOOD BY AUTOMATED COUNT: 25.2 % (ref 10–15)
HCT VFR BLD AUTO: 32.9 % (ref 40–53)
HGB BLD-MCNC: 10.7 G/DL (ref 13.3–17.7)
IMM GRANULOCYTES # BLD: 0 10E3/UL
IMM GRANULOCYTES NFR BLD: 0 %
LYMPHOCYTES # BLD AUTO: 1.8 10E3/UL (ref 0.8–5.3)
LYMPHOCYTES NFR BLD AUTO: 23 %
MCH RBC QN AUTO: 25.1 PG (ref 26.5–33)
MCHC RBC AUTO-ENTMCNC: 32.5 G/DL (ref 31.5–36.5)
MCV RBC AUTO: 77 FL (ref 78–100)
MONOCYTES # BLD AUTO: 1 10E3/UL (ref 0–1.3)
MONOCYTES NFR BLD AUTO: 13 %
NEUTROPHILS # BLD AUTO: 4.5 10E3/UL (ref 1.6–8.3)
NEUTROPHILS NFR BLD AUTO: 56 %
NRBC # BLD AUTO: 0.2 10E3/UL
NRBC BLD AUTO-RTO: 2 /100
PLATELET # BLD AUTO: 738 10E3/UL (ref 150–450)
RBC # BLD AUTO: 4.26 10E6/UL (ref 4.4–5.9)
WBC # BLD AUTO: 8.1 10E3/UL (ref 4–11)

## 2024-11-11 PROCEDURE — 86850 RBC ANTIBODY SCREEN: CPT

## 2024-11-11 PROCEDURE — 86923 COMPATIBILITY TEST ELECTRIC: CPT

## 2024-11-11 PROCEDURE — 85014 HEMATOCRIT: CPT

## 2024-11-11 PROCEDURE — 36415 COLL VENOUS BLD VENIPUNCTURE: CPT

## 2024-11-11 PROCEDURE — 86900 BLOOD TYPING SEROLOGIC ABO: CPT

## 2024-11-11 PROCEDURE — 85004 AUTOMATED DIFF WBC COUNT: CPT

## 2024-11-12 ENCOUNTER — HOSPITAL ENCOUNTER (OUTPATIENT)
Dept: LAB | Facility: CLINIC | Age: 29
Discharge: HOME | End: 2024-11-12
Attending: PEDIATRICS | Admitting: PEDIATRICS
Payer: COMMERCIAL

## 2024-11-12 VITALS
WEIGHT: 237.44 LBS | BODY MASS INDEX: 32.16 KG/M2 | HEART RATE: 85 BPM | TEMPERATURE: 97.8 F | DIASTOLIC BLOOD PRESSURE: 75 MMHG | RESPIRATION RATE: 20 BRPM | SYSTOLIC BLOOD PRESSURE: 123 MMHG

## 2024-11-12 DIAGNOSIS — D57.1 HB-SS DISEASE WITHOUT CRISIS (H): Primary | ICD-10-CM

## 2024-11-12 LAB
BLD PROD TYP BPU: NORMAL
BLOOD COMPONENT TYPE: NORMAL
CODING SYSTEM: NORMAL
CROSSMATCH: NORMAL
HCT VFR BLD AUTO: 29.7 % (ref 40–53)
HCT VFR BLD AUTO: 31.2 % (ref 40–53)
HGB BLD-MCNC: 10 G/DL (ref 13.3–17.7)
HGB BLD-MCNC: 9.9 G/DL (ref 13.3–17.7)
ISSUE DATE AND TIME: NORMAL
UNIT ABO/RH: NORMAL
UNIT NUMBER: NORMAL
UNIT STATUS: NORMAL
UNIT TYPE ISBT: 5100
UNIT TYPE ISBT: 9500

## 2024-11-12 PROCEDURE — 250N000009 HC RX 250

## 2024-11-12 PROCEDURE — 85018 HEMOGLOBIN: CPT

## 2024-11-12 PROCEDURE — 83020 HEMOGLOBIN ELECTROPHORESIS: CPT

## 2024-11-12 PROCEDURE — 36415 COLL VENOUS BLD VENIPUNCTURE: CPT

## 2024-11-12 PROCEDURE — P9016 RBC LEUKOCYTES REDUCED: HCPCS

## 2024-11-12 PROCEDURE — 36512 APHERESIS RBC: CPT

## 2024-11-12 PROCEDURE — 250N000013 HC RX MED GY IP 250 OP 250 PS 637

## 2024-11-12 RX ORDER — HYDROXYZINE HYDROCHLORIDE 25 MG/1
25 TABLET, FILM COATED ORAL ONCE
Status: COMPLETED | OUTPATIENT
Start: 2024-11-12 | End: 2024-11-12

## 2024-11-12 RX ORDER — ASPIRIN 81 MG/1
324 TABLET, CHEWABLE ORAL DAILY
Status: DISCONTINUED | OUTPATIENT
Start: 2024-11-12 | End: 2024-11-12

## 2024-11-12 RX ADMIN — ANTICOAGULANT CITRATE DEXTROSE SOLUTION FORMULA A 604 ML: 12.25; 11; 3.65 SOLUTION INTRAVENOUS at 09:03

## 2024-11-12 RX ADMIN — ASPIRIN 81 MG CHEWABLE TABLET 324 MG: 81 TABLET CHEWABLE at 08:41

## 2024-11-12 RX ADMIN — HYDROXYZINE HYDROCHLORIDE 25 MG: 25 TABLET, FILM COATED ORAL at 08:40

## 2024-11-12 NOTE — PROCEDURES
Laboratory Medicine and Pathology  Transfusion Medicine - Apheresis Procedure    Ricky Pak MRN# [id number]   YOB: 1995 Age: 28 year old        Reason for consult: Sickle cell anemia           Assessment and Plan:   The patient is a 28 year old male with sickle cell anemia. He underwent maintenance RBC exchange. He tolerated the procedure well.         Chief Complaint:   Sickle cell anemia         History of Present Illness:   The patient is a 28 year old male with sickle cell disease. He currently receives RBC exchange about 1X/month. He travels for work, so does some here and in other locations. Uses peripheral vascular access and has not had trouble with this.  He reports feeling well today. No pain. No recent hospitalizations or ED visits.         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     Current Facility-Administered Medications   Medication Dose Route Frequency Provider Last Rate Last Admin    aspirin (ASA) chewable tablet 324 mg  324 mg Oral Daily Morris Lemus MD   324 mg at 11/12/24 0820            Review of Systems:   Denied fever, chills, cough, shortness of breath, nausea, vomiting, diarrhea, pain         Exam:   /91 P 64 T 98.3 RR 20 O2 sat 100%  Alert, no apparent distress  No scleral icterus  Breathing appears comfortable  Moves all extremities  PIV access          Data:     Results for orders placed or performed during the hospital encounter of 11/12/24 (from the past 24 hours)   Hemoglobin and hematocrit   Result Value Ref Range    Hemoglobin 9.9 (L) 13.3 - 17.7 g/dL    Hematocrit 31.2 (L) 40.0 - 53.0 %   Hemoglobin and hematocrit   Result Value Ref Range    Hemoglobin 10.0 (L) 13.3 - 17.7 g/dL    Hematocrit 29.7 (L) 40.0 - 53.0 %          Procedure Summary:   A single volume red blood cell exchange was performed with a Spectra Optia cell separator.  The vascular access was peripheral IV.  ACD-A was used for anticoagulation.   The replacement fluid was Rh and K matched, fresh, leukocyte reduced RBC units (14 units).  The patient's vital signs were stable throughout, but he was hypertensive.  The patient tolerated the procedure well.    Attestation: During the procedure the patient was directly seen and evaluated by me, Charo Ramirez MD, PhD.  I have reviewed the chart and pertinent laboratory findings, and discussed the patient and the current procedure with the Apheresis nursing staff.    Charo Ramirez MD, PhD  Transfusion Medicine Attending  Laboratory Medicine & Pathology

## 2024-11-12 NOTE — DISCHARGE INSTRUCTIONS
Apheresis Blood Donor Center Post Instructions  You may feel tired after your procedure today.   Please call your doctor if you have:  bleeding that doesn t stop, fever, pain where a needle or tube (catheter) was placed, seizures, trouble breathing, red urine, nausea or vomiting, other health concerns.     If your symptoms are severe, call [phone].  If your veins were used, keep the bandages on for 2-4 hours.  Avoid heavy lifting with your arms.  If bleeding occurs from these sites, apply firm pressure for 5-10 minutes.  Call your physician if bleeding continues.    If you get a bruise:  1)  Apply ice to the area intermittently for 10-15 minutes during the first 24 hours.  2)  Thereafter, apply intermittent warm moist heat for 10-15 minutes to the area.  3)  A rainbow of colors may occur for about 10 days.    If you get a bruise larger than 2-3 inches in diameter, redness, swelling, or pain where the needle was, or tingling in your fingers or arm, contact the Apheresis/Blood Donor Center @ [phone].    The Apheresis/Blood Donor Center is open Monday-Friday 7:30 a.m. to 5 p.m.  The phone number is [phone].  A Transfusion Medicine physician can be reached after 5:00 p.m. weekdays and on weekends /Holidays by calling [phone], and asking for the physician on call.      Red blood cell exchange:   If you received blood products (plasma or red blood cells) as part of your treatment, you need to be aware that transfusion reactions can occur up to several hours after they have been given to you.  Call your physician if you experience any symptoms in the next 48 hours, including breathing problems, rash, itching, hives, nausea or vomiting, fever or chills, blood in your urine or stools, or joint pain.  Please inform the Transfusion Medicine Physician by calling [phone] and asking for the physician on call.

## 2024-11-15 LAB
HGB S BLD QL: ABNORMAL
HGB S BLD QL: ABNORMAL
HGB S MFR BLD ELPH: 28.6 %
HGB S MFR BLD ELPH: 5.8 %

## 2024-11-21 ENCOUNTER — PATIENT OUTREACH (OUTPATIENT)
Dept: ONCOLOGY | Facility: CLINIC | Age: 29
End: 2024-11-21
Payer: COMMERCIAL

## 2024-11-21 NOTE — PROGRESS NOTES
[facility]: Cancer Care                                                                                          Completed chart audit to update Oncology Care Coordination enrollment status.  Reviewed POC and pt has appropriate follow up scheduled.       Signature:  Paula Viera RN

## 2024-12-10 DIAGNOSIS — D57.1 HB-SS DISEASE WITHOUT CRISIS (H): Primary | ICD-10-CM

## 2025-01-16 LAB
ABO + RH BLD: NORMAL
BLD GP AB SCN SERPL QL: NEGATIVE
SPECIMEN EXP DATE BLD: NORMAL

## 2025-01-17 ENCOUNTER — LAB (OUTPATIENT)
Dept: LAB | Facility: CLINIC | Age: 30
End: 2025-01-17
Attending: PEDIATRICS
Payer: COMMERCIAL

## 2025-01-17 DIAGNOSIS — D57.1 HB-SS DISEASE WITHOUT CRISIS (H): ICD-10-CM

## 2025-01-17 DIAGNOSIS — Z86.73 HISTORY OF CVA (CEREBROVASCULAR ACCIDENT): ICD-10-CM

## 2025-01-17 LAB
BASOPHILS # BLD AUTO: 0.2 10E3/UL (ref 0–0.2)
BASOPHILS NFR BLD AUTO: 2 %
EOSINOPHIL # BLD AUTO: 0.9 10E3/UL (ref 0–0.7)
EOSINOPHIL NFR BLD AUTO: 10 %
ERYTHROCYTE [DISTWIDTH] IN BLOOD BY AUTOMATED COUNT: 28.6 % (ref 10–15)
HCT VFR BLD AUTO: 32.2 % (ref 40–53)
HGB BLD-MCNC: 10.3 G/DL (ref 13.3–17.7)
IMM GRANULOCYTES # BLD: 0 10E3/UL
IMM GRANULOCYTES NFR BLD: 0 %
LYMPHOCYTES # BLD AUTO: 2.2 10E3/UL (ref 0.8–5.3)
LYMPHOCYTES NFR BLD AUTO: 25 %
MCH RBC QN AUTO: 22.5 PG (ref 26.5–33)
MCHC RBC AUTO-ENTMCNC: 32 G/DL (ref 31.5–36.5)
MCV RBC AUTO: 70 FL (ref 78–100)
MONOCYTES # BLD AUTO: 0.7 10E3/UL (ref 0–1.3)
MONOCYTES NFR BLD AUTO: 9 %
NEUTROPHILS # BLD AUTO: 4.5 10E3/UL (ref 1.6–8.3)
NEUTROPHILS NFR BLD AUTO: 53 %
NRBC # BLD AUTO: 0.1 10E3/UL
NRBC BLD AUTO-RTO: 1 /100
PLATELET # BLD AUTO: 564 10E3/UL (ref 150–450)
RBC # BLD AUTO: 4.58 10E6/UL (ref 4.4–5.9)
WBC # BLD AUTO: 8.5 10E3/UL (ref 4–11)

## 2025-01-17 PROCEDURE — 86923 COMPATIBILITY TEST ELECTRIC: CPT

## 2025-01-17 PROCEDURE — 86900 BLOOD TYPING SEROLOGIC ABO: CPT

## 2025-01-17 PROCEDURE — 36415 COLL VENOUS BLD VENIPUNCTURE: CPT

## 2025-01-17 PROCEDURE — 85004 AUTOMATED DIFF WBC COUNT: CPT

## 2025-01-17 PROCEDURE — 86901 BLOOD TYPING SEROLOGIC RH(D): CPT

## 2025-01-17 RX ORDER — DIPHENHYDRAMINE HYDROCHLORIDE 50 MG/ML
50 INJECTION INTRAMUSCULAR; INTRAVENOUS
Status: CANCELLED | OUTPATIENT
Start: 2025-01-17

## 2025-01-17 NOTE — NURSING NOTE
Chief Complaint   Patient presents with    Labs Only     Labs collected from venipuncture by RN.     Labs collected from venipuncture by RN.     Constanza Anderson RN

## 2025-01-20 ENCOUNTER — HOSPITAL ENCOUNTER (OUTPATIENT)
Dept: LAB | Facility: CLINIC | Age: 30
Discharge: HOME | End: 2025-01-20
Attending: PEDIATRICS | Admitting: PEDIATRICS
Payer: COMMERCIAL

## 2025-01-20 VITALS
SYSTOLIC BLOOD PRESSURE: 141 MMHG | BODY MASS INDEX: 31.4 KG/M2 | WEIGHT: 231.8 LBS | RESPIRATION RATE: 16 BRPM | HEIGHT: 72 IN | OXYGEN SATURATION: 98 % | HEART RATE: 72 BPM | DIASTOLIC BLOOD PRESSURE: 80 MMHG | TEMPERATURE: 97.7 F

## 2025-01-20 DIAGNOSIS — D57.1 SICKLE CELL DISEASE WITHOUT CRISIS (H): Primary | ICD-10-CM

## 2025-01-20 LAB
HCT VFR BLD AUTO: 32.4 % (ref 40–53)
HCT VFR BLD AUTO: 33.2 % (ref 40–53)
HGB BLD-MCNC: 10.3 G/DL (ref 13.3–17.7)
HGB BLD-MCNC: 11.1 G/DL (ref 13.3–17.7)

## 2025-01-20 PROCEDURE — 250N000013 HC RX MED GY IP 250 OP 250 PS 637: Performed by: PATHOLOGY

## 2025-01-20 PROCEDURE — 83020 HEMOGLOBIN ELECTROPHORESIS: CPT

## 2025-01-20 PROCEDURE — P9016 RBC LEUKOCYTES REDUCED: HCPCS

## 2025-01-20 PROCEDURE — 85018 HEMOGLOBIN: CPT

## 2025-01-20 PROCEDURE — 250N000009 HC RX 250

## 2025-01-20 PROCEDURE — 36415 COLL VENOUS BLD VENIPUNCTURE: CPT

## 2025-01-20 PROCEDURE — 36512 APHERESIS RBC: CPT

## 2025-01-20 RX ORDER — ASPIRIN 325 MG
325 TABLET ORAL ONCE
Status: COMPLETED | OUTPATIENT
Start: 2025-01-20 | End: 2025-01-20

## 2025-01-20 RX ORDER — HYDROXYZINE HYDROCHLORIDE 25 MG/1
25 TABLET, FILM COATED ORAL ONCE
Status: COMPLETED | OUTPATIENT
Start: 2025-01-20 | End: 2025-01-20

## 2025-01-20 RX ADMIN — ASPIRIN 325 MG ORAL TABLET 325 MG: 325 PILL ORAL at 13:08

## 2025-01-20 RX ADMIN — ANTICOAGULANT CITRATE DEXTROSE SOLUTION FORMULA A 627 ML: 12.25; 11; 3.65 SOLUTION INTRAVENOUS at 13:20

## 2025-01-20 RX ADMIN — HYDROXYZINE HYDROCHLORIDE 25 MG: 25 TABLET, FILM COATED ORAL at 13:08

## 2025-01-20 NOTE — PROCEDURES
"    Transfusion Medicine  Apheresis Procedure Note    Ricky Pak [id number]   YOB: 1995 Age: 29 year old         Procedure:  Red blood cell exchange (RBCX)           Assessment and Plan:   29 year old male is undergoes Red blood cell exchange (RBCX) for sickle cell disease.      He reports feeling well today. No pain. No recent hospitalizations or ED visits.   He notes feeling well today.  Denies nausea, vomiting, fevers, chills, diarrhea, shortness of breath.                 History of Present Illness:       The patient is a 29 year old male with sickle cell disease. He currently receives prophylactic RBC exchange about 1X/month. He travels for work, so does some here and in other locations sometimes. Uses peripheral vascular access and has not had trouble with this.             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             Past Surgical History:     Past Surgical History:   Procedure Laterality Date    CHOLECYSTECTOMY N/A 2011    LIVER BIOPSY N/A 2007    iron overload monitoring    LIVER BIOPSY N/A 2008    monitoring for iron overload             Allergies:     Allergies   Allergen Reactions    Diphenhydramine Hives             Medications:     No current outpatient medications on file.     No current facility-administered medications for this encounter.             Review of Systems:     See above           Exam:   BP (!) 145/65   Pulse 76   Temp 97.8  F (36.6  C) (Temporal)   Resp 16   Ht 1.83 m (6' 0.05\")   Wt 105.1 kg (231 lb 12.8 oz)   SpO2 98%   BMI 31.40 kg/m    Alert, no apparent distress  Breathing appears comfortable on room air  Peripheral IV access for the " procedure.              Data:     Results for orders placed or performed during the hospital encounter of 01/20/25 (from the past 24 hours)   Hemoglobin and hematocrit   Result Value Ref Range    Hemoglobin 10.3 (L) 13.3 - 17.7 g/dL    Hematocrit 32.4 (L) 40.0 - 53.0 %   Hemoglobin and hematocrit   Result Value Ref Range    Hemoglobin 11.1 (L) 13.3 - 17.7 g/dL    Hematocrit 33.2 (L) 40.0 - 53.0 %       CBC  Recent Labs   Lab 01/20/25  1601 01/20/25  1320 01/17/25  1226   WBC  --   --  8.5   RBC  --   --  4.58   HGB 11.1* 10.3* 10.3*   HCT 33.2* 32.4* 32.2*   MCV  --   --  70*   MCH  --   --  22.5*   MCHC  --   --  32.0   RDW  --   --  28.6*   PLT  --   --  564*             Procedure Summary:   A red blood cell exchange was performed using donor red blood cells as the replacement product.    Peripheral veins were used for access and allowed for appropriate flow during the procedure.  ACD-A was used for anticoagulation.   The patient's vital signs were stable throughout.  The patient tolerated the procedure well without complication.  See apheresis flowsheet for additional details.          Attestation: During the procedure the patient was directly seen and evaluated by me, Maikel Del Toro MD.  The pathology resident, Suzi Dias MD, was also present for the evaluation.  I have reviewed the chart and pertinent laboratory findings, and discussed the patient and the current procedure with the Apheresis nursing staff.    Maikel Del Toro MD  Transfusion Medicine Attending  Laboratory Medicine and Pathology

## 2025-01-20 NOTE — DISCHARGE INSTRUCTIONS
After Your Blood Transfusion  Discharge Instructions  After you leave  After you have a blood transfusion,* watch for signs of a transfusion reaction for the next 48 hours.   Signs to watch for:  Shaking or chills  Fever above 100.4 F  Headache  Nausea (feeling sich to your stomach)  Hives  Itching  Swelling of the face or feeling flushed  Ongoing dry cough (nothing is coughed up)  Trouble breathing, or wheezing  Call your clinic or 911, or go to the Emergency Room, if you have any signs of a transfusion reaction.  After the first 48 hours  Some signs of a reaction won't show up for a few days or up to 4 weeks. Call your clinic if you have symptoms of a transfusion reaction.  These may include:  Fatigue (feeling very tired)  Dizziness  Pink or red urine  *A blood transfusion is when you receive red blood cells, platelets, plasma or cryoprecipitate.   For informational purposes only. Not to replace the advice of your health care provider. Copyright   2015 [employer]. All rights reserved. [employer] 556479 - Rev 02/22.    Apheresis Blood Donor Center Post Instructions  You may feel tired after your procedure today.   Please call your doctor if you have:  bleeding that doesn t stop, fever, pain where a needle or tube (catheter) was placed, seizures, trouble breathing, red urine, nausea or vomiting, other health concerns.     If your symptoms are severe, call 911.  If your veins were used, keep the bandages on for 2-4 hours.  Avoid heavy lifting with your arms.  If bleeding occurs from these sites, apply firm pressure for 5-10 minutes.  Call your physician if bleeding continues.    If you have a Central Venous Catheter:  Notify your doctor if you have had a fever, chills, shaking  or redness, warmth, swelling, drainage at the exit-site.  This could be a sign of infection.    If we used your fistula or graft, watch for signs of bleeding.  Please remove the bandages after 4 hours.  The Apheresis/Blood Donor  Center is open Monday-Friday 7:30 a.m. to 5 p.m.  The phone number is [phone].  A Transfusion Medicine physician can be reached after 5:00 p.m. weekdays and on weekends /Holidays by calling [phone], and asking for the physician on call.      Red blood cell exchange:   If you received blood products (plasma or red blood cells) as part of your treatment, you need to be aware that transfusion reactions can occur up to several hours after they have been given to you.  Call your physician if you experience any symptoms in the next 48 hours, including breathing problems, rash, itching, hives, nausea or vomiting, fever or chills, blood in your urine or stools, or joint pain.  Please inform the Transfusion Medicine Physician by calling [phone] and asking for the physician on call.

## 2025-01-21 LAB
HGB S BLD QL: ABNORMAL
HGB S BLD QL: ABNORMAL
HGB S MFR BLD ELPH: 11 %
HGB S MFR BLD ELPH: 56.4 %

## 2025-03-04 LAB
ABO + RH BLD: NORMAL
BLD GP AB SCN SERPL QL: NEGATIVE
SPECIMEN EXP DATE BLD: NORMAL

## 2025-03-05 ENCOUNTER — LAB (OUTPATIENT)
Dept: LAB | Facility: CLINIC | Age: 30
End: 2025-03-05
Payer: COMMERCIAL

## 2025-03-05 DIAGNOSIS — Z86.73 HISTORY OF CVA (CEREBROVASCULAR ACCIDENT): ICD-10-CM

## 2025-03-05 DIAGNOSIS — D57.1 HB-SS DISEASE WITHOUT CRISIS (H): ICD-10-CM

## 2025-03-05 LAB
BASOPHILS # BLD AUTO: 0.1 10E3/UL (ref 0–0.2)
BASOPHILS NFR BLD AUTO: 2 %
BLD PROD TYP BPU: NORMAL
BLOOD COMPONENT TYPE: NORMAL
CODING SYSTEM: NORMAL
CROSSMATCH: NORMAL
EOSINOPHIL # BLD AUTO: 0.5 10E3/UL (ref 0–0.7)
EOSINOPHIL NFR BLD AUTO: 6 %
ERYTHROCYTE [DISTWIDTH] IN BLOOD BY AUTOMATED COUNT: 20.5 % (ref 10–15)
HCT VFR BLD AUTO: 32.2 % (ref 40–53)
HGB BLD-MCNC: 10.6 G/DL (ref 13.3–17.7)
IMM GRANULOCYTES # BLD: 0 10E3/UL
IMM GRANULOCYTES NFR BLD: 0 %
ISSUE DATE AND TIME: NORMAL
LYMPHOCYTES # BLD AUTO: 2.2 10E3/UL (ref 0.8–5.3)
LYMPHOCYTES NFR BLD AUTO: 25 %
MCH RBC QN AUTO: 26.2 PG (ref 26.5–33)
MCHC RBC AUTO-ENTMCNC: 32.9 G/DL (ref 31.5–36.5)
MCV RBC AUTO: 80 FL (ref 78–100)
MONOCYTES # BLD AUTO: 0.6 10E3/UL (ref 0–1.3)
MONOCYTES NFR BLD AUTO: 7 %
NEUTROPHILS # BLD AUTO: 5.5 10E3/UL (ref 1.6–8.3)
NEUTROPHILS NFR BLD AUTO: 61 %
NRBC # BLD AUTO: 0.1 10E3/UL
NRBC BLD AUTO-RTO: 1 /100
PLATELET # BLD AUTO: 463 10E3/UL (ref 150–450)
RBC # BLD AUTO: 4.04 10E6/UL (ref 4.4–5.9)
UNIT ABO/RH: NORMAL
UNIT NUMBER: NORMAL
UNIT STATUS: NORMAL
UNIT TYPE ISBT: 9500
WBC # BLD AUTO: 9.1 10E3/UL (ref 4–11)

## 2025-03-05 PROCEDURE — 85004 AUTOMATED DIFF WBC COUNT: CPT

## 2025-03-05 PROCEDURE — 36415 COLL VENOUS BLD VENIPUNCTURE: CPT

## 2025-03-05 PROCEDURE — 86923 COMPATIBILITY TEST ELECTRIC: CPT

## 2025-03-05 PROCEDURE — 86850 RBC ANTIBODY SCREEN: CPT

## 2025-03-05 PROCEDURE — 86900 BLOOD TYPING SEROLOGIC ABO: CPT

## 2025-03-05 RX ORDER — CALCIUM CARBONATE 500 MG/1
1000 TABLET, CHEWABLE ORAL
Status: CANCELLED | OUTPATIENT
Start: 2025-03-05

## 2025-03-05 RX ORDER — ACETAMINOPHEN 325 MG/1
650 TABLET ORAL
Status: CANCELLED | OUTPATIENT
Start: 2025-03-05

## 2025-03-05 NOTE — NURSING NOTE
Chief Complaint   Patient presents with    Labs Only     Labs drawn via  by RN in lab.      Labs collected from venipuncture by RN.    Cherry Teixeira RN

## 2025-03-06 ENCOUNTER — HOSPITAL ENCOUNTER (OUTPATIENT)
Dept: LAB | Facility: CLINIC | Age: 30
End: 2025-03-06
Attending: PEDIATRICS
Payer: COMMERCIAL

## 2025-03-06 VITALS
BODY MASS INDEX: 31.59 KG/M2 | TEMPERATURE: 97.7 F | WEIGHT: 233.25 LBS | HEART RATE: 68 BPM | DIASTOLIC BLOOD PRESSURE: 73 MMHG | RESPIRATION RATE: 18 BRPM | SYSTOLIC BLOOD PRESSURE: 114 MMHG

## 2025-03-06 LAB
HCT VFR BLD AUTO: 30.8 % (ref 40–53)
HCT VFR BLD AUTO: 31.7 % (ref 40–53)
HGB BLD-MCNC: 10.2 G/DL (ref 13.3–17.7)
HGB BLD-MCNC: 10.5 G/DL (ref 13.3–17.7)

## 2025-03-06 PROCEDURE — 250N000009 HC RX 250

## 2025-03-06 PROCEDURE — 36512 APHERESIS RBC: CPT

## 2025-03-06 PROCEDURE — 250N000013 HC RX MED GY IP 250 OP 250 PS 637

## 2025-03-06 PROCEDURE — 36415 COLL VENOUS BLD VENIPUNCTURE: CPT

## 2025-03-06 PROCEDURE — P9016 RBC LEUKOCYTES REDUCED: HCPCS

## 2025-03-06 PROCEDURE — 85018 HEMOGLOBIN: CPT

## 2025-03-06 RX ORDER — HYDROXYZINE HYDROCHLORIDE 25 MG/1
25 TABLET, FILM COATED ORAL
Status: COMPLETED | OUTPATIENT
Start: 2025-03-06 | End: 2025-03-06

## 2025-03-06 RX ORDER — ASPIRIN 325 MG
325 TABLET ORAL ONCE
Status: COMPLETED | OUTPATIENT
Start: 2025-03-06 | End: 2025-03-06

## 2025-03-06 RX ADMIN — HYDROXYZINE HYDROCHLORIDE 25 MG: 25 TABLET, FILM COATED ORAL at 09:01

## 2025-03-06 RX ADMIN — ANTICOAGULANT CITRATE DEXTROSE SOLUTION FORMULA A 610 ML: 12.25; 11; 3.65 SOLUTION INTRAVENOUS at 09:14

## 2025-03-06 RX ADMIN — ASPIRIN 325 MG ORAL TABLET 325 MG: 325 PILL ORAL at 09:01

## 2025-03-06 NOTE — PROCEDURES
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    Pt's consent for  TPE is expiring soon so risks and benefits  reviewed. He had all of his questions answered and the patient consented to the procedure.   Attestation:   This patient has been seen and evaluated by me, Spencer Harris. .        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 He was on monthly transfusions from 0747-2134 with chelation and in 2013 he was switched to peripheral RBC exchange which has kept him in good health . He no longer needs chelation and is able to keep his HbS ~30% or below with regularity. He grew up in [place] and has spent most of his life in the [facility]. 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 When he is in the area, he will come and receive red blood cell exchanges here at the [facility] which is  now ~  once a month        He did have a hypotensive episode following a previous apheresis procedure here which was a depletion followed by an exchange, so we have avoided depletion-exchanges and gone with conventional RBC exchanges since then.         Past Medical History:     Past Medical History:   Diagnosis Date    Gallstones 2011    s/p cholecystectomy    Hb-SS disease without crisis (H)     History of CVA (cerebrovascular accident) 07/2006    macular  "infarct, but regained vision    Iron overload due to repeated red blood cell transfusions 2013    s/p chelation    Sleep apnea     Treated with CPAP.             Past Surgical History:     Past Surgical History:   Procedure Laterality Date    CHOLECYSTECTOMY N/A 2011    LIVER BIOPSY N/A 2007    iron overload monitoring    LIVER BIOPSY N/A 2008    monitoring for iron overload              Social History:     Social History     Tobacco Use    Smoking status: Never     Passive exposure: Never    Smokeless tobacco: Never   Substance Use Topics    Alcohol use: Yes     Alcohol/week: 2.0 standard drinks of alcohol     Types: 2 Standard drinks or equivalent per week     Comment: \"A few beers every other weekend.\"            Allergies:     Allergies   Allergen Reactions    Diphenhydramine Hives             Medications:     No current outpatient medications on file.     No current facility-administered medications for this encounter.           Abbreviated Physical Exam:   BP (!) 177/85   Pulse 76   Temp 97.7  F (36.5  C) (Oral)   Resp 18   Wt 105.8 kg (233 lb 4 oz)   BMI 31.59 kg/m      Patient Alert & Oriented and in no apparent distress  Breathing appears comfortable on room air  Peripheral Veins accessed for the procedure           Laboratory Data:   BMPNo lab results found in last 7 days.  CBC  Recent Labs   Lab 03/06/25  0910 03/05/25  1121   WBC  --  9.1   RBC  --  4.04*   HGB 10.5* 10.6*   HCT 31.7* 32.2*   MCV  --  80   MCH  --  26.2*   MCHC  --  32.9   RDW  --  20.5*   PLT  --  463*     INRNo lab results found in last 7 days.         Procedure Summary:   A red blood cell exchange was performed using ~15 units of donor red blood cells as replacement. The RBCs were HgbS-negative and also matched for patient's Rh (CcDEe) and Jeff type.    Peripheral veins were used for access and allowed for appropriate flow during the procedure.  ACD-A was used for anticoagulation  Attestation:   During the procedure the patient " was directly seen and evaluated by me, Spencer Harris MD.  I have reviewed the chart and pertinent laboratory findings, and discussed the patient and the current procedure with the Apheresis nursing staff.   Spencer Harris MD  Transfusion Medicine Attending  Division of Transfusion Medicine   Department of Laboratory Medicine & [place] [address]   Pager: [phone]

## 2025-03-06 NOTE — DISCHARGE INSTRUCTIONS
Red blood cell exchange:   You received blood products (red blood cells) as part of your treatment, you need to be aware that transfusion reactions can occur up to several hours after they have been given to you.  Call your physician if you experience any symptoms in the next 48 hours, including breathing problems, rash, itching, hives, nausea or vomiting, fever or chills, blood in your urine or stools, or joint pain.  Please inform the Transfusion Medicine Physician by calling [phone] and asking for the physician on call.

## 2025-04-22 LAB
ABO + RH BLD: NORMAL
BLD GP AB SCN SERPL QL: NEGATIVE
SPECIMEN EXP DATE BLD: NORMAL

## 2025-04-23 ENCOUNTER — LAB (OUTPATIENT)
Dept: LAB | Facility: CLINIC | Age: 30
End: 2025-04-23
Attending: PEDIATRICS
Payer: COMMERCIAL

## 2025-04-23 DIAGNOSIS — Z86.73 HISTORY OF CVA (CEREBROVASCULAR ACCIDENT): ICD-10-CM

## 2025-04-23 DIAGNOSIS — D57.1 HB-SS DISEASE WITHOUT CRISIS (H): ICD-10-CM

## 2025-04-23 LAB
BASOPHILS # BLD AUTO: 0.2 10E3/UL (ref 0–0.2)
BASOPHILS NFR BLD AUTO: 2 %
BLD PROD TYP BPU: NORMAL
BLOOD COMPONENT TYPE: NORMAL
CODING SYSTEM: NORMAL
CROSSMATCH: NORMAL
EOSINOPHIL # BLD AUTO: 0.4 10E3/UL (ref 0–0.7)
EOSINOPHIL NFR BLD AUTO: 5 %
ERYTHROCYTE [DISTWIDTH] IN BLOOD BY AUTOMATED COUNT: 21 % (ref 10–15)
HCT VFR BLD AUTO: 31.7 % (ref 40–53)
HGB BLD-MCNC: 10.3 G/DL (ref 13.3–17.7)
IMM GRANULOCYTES # BLD: 0 10E3/UL
IMM GRANULOCYTES NFR BLD: 0 %
ISSUE DATE AND TIME: NORMAL
LYMPHOCYTES # BLD AUTO: 2 10E3/UL (ref 0.8–5.3)
LYMPHOCYTES NFR BLD AUTO: 22 %
MCH RBC QN AUTO: 25.4 PG (ref 26.5–33)
MCHC RBC AUTO-ENTMCNC: 32.5 G/DL (ref 31.5–36.5)
MCV RBC AUTO: 78 FL (ref 78–100)
MONOCYTES # BLD AUTO: 0.8 10E3/UL (ref 0–1.3)
MONOCYTES NFR BLD AUTO: 9 %
NEUTROPHILS # BLD AUTO: 5.9 10E3/UL (ref 1.6–8.3)
NEUTROPHILS NFR BLD AUTO: 63 %
NRBC # BLD AUTO: 0.1 10E3/UL
NRBC BLD AUTO-RTO: 1 /100
PLATELET # BLD AUTO: 436 10E3/UL (ref 150–450)
RBC # BLD AUTO: 4.06 10E6/UL (ref 4.4–5.9)
UNIT ABO/RH: NORMAL
UNIT NUMBER: NORMAL
UNIT STATUS: NORMAL
UNIT TYPE ISBT: 5100
UNIT TYPE ISBT: 9500
WBC # BLD AUTO: 9.4 10E3/UL (ref 4–11)

## 2025-04-23 PROCEDURE — 86923 COMPATIBILITY TEST ELECTRIC: CPT

## 2025-04-23 PROCEDURE — 86900 BLOOD TYPING SEROLOGIC ABO: CPT

## 2025-04-23 PROCEDURE — 85004 AUTOMATED DIFF WBC COUNT: CPT

## 2025-04-23 PROCEDURE — 36415 COLL VENOUS BLD VENIPUNCTURE: CPT

## 2025-04-23 RX ORDER — DIPHENHYDRAMINE HYDROCHLORIDE 50 MG/ML
50 INJECTION, SOLUTION INTRAMUSCULAR; INTRAVENOUS
Status: CANCELLED | OUTPATIENT
Start: 2025-04-23

## 2025-04-23 NOTE — NURSING NOTE
Chief Complaint   Patient presents with    Blood Draw     Labs collected from venipuncture by RN.      Labs collected from venipuncture by RN.     Constanza Anderson RN

## 2025-04-24 ENCOUNTER — HOSPITAL ENCOUNTER (OUTPATIENT)
Dept: LAB | Facility: CLINIC | Age: 30
Discharge: HOME | End: 2025-04-24
Attending: PEDIATRICS
Payer: COMMERCIAL

## 2025-04-24 VITALS
TEMPERATURE: 98.2 F | DIASTOLIC BLOOD PRESSURE: 66 MMHG | WEIGHT: 237.88 LBS | HEART RATE: 89 BPM | SYSTOLIC BLOOD PRESSURE: 145 MMHG | BODY MASS INDEX: 32.22 KG/M2 | RESPIRATION RATE: 16 BRPM

## 2025-04-24 LAB
HCT VFR BLD AUTO: 29.5 % (ref 40–53)
HCT VFR BLD AUTO: 32.5 % (ref 40–53)
HGB BLD-MCNC: 10.1 G/DL (ref 13.3–17.7)
HGB BLD-MCNC: 10.4 G/DL (ref 13.3–17.7)

## 2025-04-24 PROCEDURE — P9016 RBC LEUKOCYTES REDUCED: HCPCS

## 2025-04-24 PROCEDURE — 250N000013 HC RX MED GY IP 250 OP 250 PS 637: Performed by: PATHOLOGY

## 2025-04-24 PROCEDURE — 250N000009 HC RX 250

## 2025-04-24 PROCEDURE — 36415 COLL VENOUS BLD VENIPUNCTURE: CPT

## 2025-04-24 PROCEDURE — 36512 APHERESIS RBC: CPT

## 2025-04-24 PROCEDURE — 85018 HEMOGLOBIN: CPT

## 2025-04-24 RX ORDER — HYDROXYZINE HYDROCHLORIDE 25 MG/1
25 TABLET, FILM COATED ORAL ONCE
Status: COMPLETED | OUTPATIENT
Start: 2025-04-24 | End: 2025-04-24

## 2025-04-24 RX ORDER — ASPIRIN 325 MG
325 TABLET ORAL ONCE
Status: COMPLETED | OUTPATIENT
Start: 2025-04-24 | End: 2025-04-24

## 2025-04-24 RX ADMIN — HYDROXYZINE HYDROCHLORIDE 25 MG: 25 TABLET ORAL at 08:28

## 2025-04-24 RX ADMIN — ASPIRIN 325 MG ORAL TABLET 325 MG: 325 PILL ORAL at 08:28

## 2025-04-24 RX ADMIN — ANTICOAGULANT CITRATE DEXTROSE SOLUTION FORMULA A 662 ML: 12.25; 11; 3.65 SOLUTION INTRAVENOUS at 09:05

## 2025-04-24 NOTE — PROCEDURES
Transfusion Medicine  Apheresis Procedure Note    Ricky Pak [id number]   YOB: 1995 Age: 29 year old         Procedure:  Red blood cell exchange (RBCX)           Assessment and Plan:   29 year old male is undergoes Red blood cell exchange (RBCX) for sickle cell disease.       He reports feeling well today. No pain. No recent hospitalizations or ED visits.   He notes feeling well today.  Denies nausea, vomiting, fevers, chills, diarrhea.                 History of Present Illness:   The patient is a 29 year old male with sickle cell disease. He currently receives prophylactic RBC exchange about 1X/month. He travels for work, so does some here and in other locations sometimes. Uses peripheral vascular access and has not had trouble with this.               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             Past Surgical History:     Past Surgical History:   Procedure Laterality Date    CHOLECYSTECTOMY N/A 2011    LIVER BIOPSY N/A 2007    iron overload monitoring    LIVER BIOPSY N/A 2008    monitoring for iron overload             Allergies:     Allergies   Allergen Reactions    Diphenhydramine Hives             Medications:     No current outpatient medications on file.     No current facility-administered medications for this encounter.             Review of Systems:     See above           Exam:   BP (!) 145/66   Pulse 89   Temp 98.2  F (36.8  C) (Oral)   Resp 16   Wt 107.9 kg (237 lb 14 oz)   BMI 32.22 kg/m    Alert, no apparent distress  Breathing appears comfortable on room air  Peripheral IV access for the procedure.             Data:     Results for orders placed or  performed during the hospital encounter of 04/24/25   Hemoglobin and hematocrit     Status: Abnormal   Result Value Ref Range    Hemoglobin 10.4 (L) 13.3 - 17.7 g/dL    Hematocrit 32.5 (L) 40.0 - 53.0 %   Hemoglobin and hematocrit     Status: Abnormal   Result Value Ref Range    Hemoglobin 10.1 (L) 13.3 - 17.7 g/dL    Hematocrit 29.5 (L) 40.0 - 53.0 %       CBC  Recent Labs   Lab 04/24/25  1144 04/24/25  0905 04/23/25  1034   WBC  --   --  9.4   RBC  --   --  4.06*   HGB 10.1* 10.4* 10.3*   HCT 29.5* 32.5* 31.7*   MCV  --   --  78   MCH  --   --  25.4*   MCHC  --   --  32.5   RDW  --   --  21.0*   PLT  --   --  436               Procedure Summary:   A red blood cell exchange was performed using donor red blood cells as the replacement product.    Peripheral veins were used for access and allowed for appropriate flow during the procedure.  ACD-A was used for anticoagulation.   The patient's vital signs were stable throughout.  The patient tolerated the procedure well without complication.  See apheresis flowsheet for additional details.        Attestation: During the procedure the patient was directly seen and evaluated by me, Maikel Del Toro MD.  I have reviewed the chart and pertinent laboratory findings, and discussed the patient and the current procedure with the Apheresis nursing staff.    Maikel Del Toro MD  Transfusion Medicine Attending  Laboratory Medicine & Pathology            none

## 2025-04-24 NOTE — DISCHARGE INSTRUCTIONS
Red blood cell exchange:   If you received blood products (plasma or red blood cells) as part of your treatment, you need to be aware that transfusion reactions can occur up to several hours after they have been given to you.  Call your physician if you experience any symptoms in the next 48 hours, including breathing problems, rash, itching, hives, nausea or vomiting, fever or chills, blood in your urine or stools, or joint pain.  Please inform the Transfusion Medicine Physician by calling [phone] and asking for the physician on call.  Apheresis Blood Donor Center Post Instructions  You may feel tired after your procedure today.   Please call your doctor if you have:  bleeding that doesn t stop, fever, pain where a needle or tube (catheter) was placed, seizures, trouble breathing, red urine, nausea or vomiting, other health concerns.     If your symptoms are severe, call [phone].  If your veins were used, keep the bandages on for 2-4 hours.  Avoid heavy lifting with your arms.  If bleeding occurs from these sites, apply firm pressure for 5-10 minutes.  Call your physician if bleeding continues.    The Apheresis/Blood Donor Center is open Monday-Friday 7:30 a.m. to 5 p.m.  The phone number is [phone].  A Transfusion Medicine physician can be reached after 5:00 p.m. weekdays and on weekends /Holidays by calling [phone], and asking for the physician on call.

## 2025-05-06 NOTE — DISCHARGE INSTRUCTIONS
Red blood cell exchange:   If you received blood products (plasma or red blood cells) as part of your treatment, you need to be aware that transfusion reactions can occur up to several hours after they have been given to you.  Call your physician if you experience any symptoms in the next 48 hours, including breathing problems, rash, itching, hives, nausea or vomiting, fever or chills, blood in your urine or stools, or joint pain.  Please inform the Transfusion Medicine Physician by calling [phone] and asking for the physician on call.Apheresis Blood Donor Center Post Instructions  You may feel tired after your procedure today.   Please call your doctor if you have:  bleeding that doesn t stop, fever, pain where a needle or tube (catheter) was placed, seizures, trouble breathing, red urine, nausea or vomiting, other health concerns.     If your symptoms are severe, call [phone].  If your veins were used, keep the bandages on for 2-4 hours.  Avoid heavy lifting with your arms.  If bleeding occurs from these sites, apply firm pressure for 5-10 minutes.  Call your physician if bleeding continues.    The Apheresis/Blood Donor Center is open Monday-Friday 7:30 a.m. to 5 p.m.  The phone number is [phone].  A Transfusion Medicine physician can be reached after 5:00 p.m. weekdays and on weekends /Holidays by calling [phone], and asking for the physician on call.      {APH PI:[id number]}   [FreeTextEntry1] : Pt states 3 months ago he had unprotected sex with a random girl and a week later he developed a burning sensation to to urethra and tip of penis. He is concerned for STD. He reports his PCP checked him for STD and the tests came back negative. Denies frequency, urgency, nocturia, urethral discharge. Occasional left testicular pain. He did not do testicular US because he was more concerned with having blood tests done. He states he no longer has testicular pain. He states he still has a burning sensation to the skin at the tip of his penis. He rates the burning sensation #2-3/10.

## 2025-06-08 LAB
ABO + RH BLD: NORMAL
BLD GP AB SCN SERPL QL: NEGATIVE
SPECIMEN EXP DATE BLD: NORMAL

## 2025-06-09 ENCOUNTER — LAB (OUTPATIENT)
Dept: LAB | Facility: CLINIC | Age: 30
End: 2025-06-09
Attending: PEDIATRICS
Payer: COMMERCIAL

## 2025-06-09 DIAGNOSIS — Z86.73 HISTORY OF CVA (CEREBROVASCULAR ACCIDENT): ICD-10-CM

## 2025-06-09 DIAGNOSIS — D57.1 HB-SS DISEASE WITHOUT CRISIS (H): ICD-10-CM

## 2025-06-09 LAB
BASOPHILS # BLD AUTO: 0.2 10E3/UL (ref 0–0.2)
BASOPHILS NFR BLD AUTO: 2 %
EOSINOPHIL # BLD AUTO: 0.7 10E3/UL (ref 0–0.7)
EOSINOPHIL NFR BLD AUTO: 8 %
ERYTHROCYTE [DISTWIDTH] IN BLOOD BY AUTOMATED COUNT: 24.2 % (ref 10–15)
HCT VFR BLD AUTO: 32.4 % (ref 40–53)
HGB BLD-MCNC: 10.3 G/DL (ref 13.3–17.7)
IMM GRANULOCYTES # BLD: 0 10E3/UL
IMM GRANULOCYTES NFR BLD: 0 %
LYMPHOCYTES # BLD AUTO: 2.5 10E3/UL (ref 0.8–5.3)
LYMPHOCYTES NFR BLD AUTO: 26 %
MCH RBC QN AUTO: 23.3 PG (ref 26.5–33)
MCHC RBC AUTO-ENTMCNC: 31.8 G/DL (ref 31.5–36.5)
MCV RBC AUTO: 73 FL (ref 78–100)
MONOCYTES # BLD AUTO: 1 10E3/UL (ref 0–1.3)
MONOCYTES NFR BLD AUTO: 10 %
NEUTROPHILS # BLD AUTO: 5.3 10E3/UL (ref 1.6–8.3)
NEUTROPHILS NFR BLD AUTO: 55 %
NRBC # BLD AUTO: 0.1 10E3/UL
NRBC BLD AUTO-RTO: 1 /100
PLATELET # BLD AUTO: 569 10E3/UL (ref 150–450)
RBC # BLD AUTO: 4.43 10E6/UL (ref 4.4–5.9)
WBC # BLD AUTO: 9.7 10E3/UL (ref 4–11)

## 2025-06-09 PROCEDURE — 36415 COLL VENOUS BLD VENIPUNCTURE: CPT

## 2025-06-09 PROCEDURE — 86923 COMPATIBILITY TEST ELECTRIC: CPT

## 2025-06-09 PROCEDURE — 86901 BLOOD TYPING SEROLOGIC RH(D): CPT

## 2025-06-09 PROCEDURE — 85004 AUTOMATED DIFF WBC COUNT: CPT

## 2025-06-09 RX ORDER — HYDROXYZINE HYDROCHLORIDE 10 MG/1
10 TABLET, FILM COATED ORAL ONCE
Status: CANCELLED | OUTPATIENT
Start: 2025-06-09

## 2025-06-09 RX ORDER — CALCIUM CARBONATE 500 MG/1
1000 TABLET, CHEWABLE ORAL
Status: CANCELLED | OUTPATIENT
Start: 2025-06-09

## 2025-06-09 RX ORDER — HYDROCORTISONE SODIUM SUCCINATE 100 MG/2ML
100 INJECTION INTRAMUSCULAR; INTRAVENOUS
Status: CANCELLED | OUTPATIENT
Start: 2025-06-09

## 2025-06-09 RX ORDER — SENNOSIDES 8.6 MG
325 CAPSULE ORAL ONCE
Status: CANCELLED | OUTPATIENT
Start: 2025-06-09

## 2025-06-09 NOTE — NURSING NOTE
Chief Complaint   Patient presents with    Labs Only     Labs drawn with  by RN.       Leonora Montenegro RN

## 2025-06-10 ENCOUNTER — HOSPITAL ENCOUNTER (OUTPATIENT)
Dept: LAB | Facility: CLINIC | Age: 30
Discharge: HOME | End: 2025-06-10
Attending: PEDIATRICS | Admitting: PEDIATRICS
Payer: COMMERCIAL

## 2025-06-10 VITALS
RESPIRATION RATE: 16 BRPM | DIASTOLIC BLOOD PRESSURE: 80 MMHG | SYSTOLIC BLOOD PRESSURE: 125 MMHG | WEIGHT: 239.64 LBS | TEMPERATURE: 98 F | HEART RATE: 98 BPM | BODY MASS INDEX: 32.46 KG/M2

## 2025-06-10 LAB
BLD PROD TYP BPU: NORMAL
BLOOD COMPONENT TYPE: NORMAL
CODING SYSTEM: NORMAL
CROSSMATCH: NORMAL
HCT VFR BLD AUTO: 31.4 % (ref 40–53)
HCT VFR BLD AUTO: 34 % (ref 40–53)
HGB BLD-MCNC: 10.2 G/DL (ref 13.3–17.7)
HGB BLD-MCNC: 11.3 G/DL (ref 13.3–17.7)
HGB S BLD QL: ABNORMAL
HGB S MFR BLD ELPH: 52 %
ISSUE DATE AND TIME: NORMAL
MCV RBC AUTO: 72 FL (ref 78–100)
MCV RBC AUTO: 85 FL (ref 78–100)
UNIT ABO/RH: NORMAL
UNIT NUMBER: NORMAL
UNIT STATUS: NORMAL
UNIT TYPE ISBT: 9500

## 2025-06-10 PROCEDURE — P9016 RBC LEUKOCYTES REDUCED: HCPCS

## 2025-06-10 PROCEDURE — 250N000009 HC RX 250: Performed by: PATHOLOGY

## 2025-06-10 PROCEDURE — 85014 HEMATOCRIT: CPT | Performed by: PATHOLOGY

## 2025-06-10 PROCEDURE — 36512 APHERESIS RBC: CPT

## 2025-06-10 PROCEDURE — 83020 HEMOGLOBIN ELECTROPHORESIS: CPT | Performed by: PATHOLOGY

## 2025-06-10 PROCEDURE — 36415 COLL VENOUS BLD VENIPUNCTURE: CPT | Performed by: PATHOLOGY

## 2025-06-10 PROCEDURE — 250N000013 HC RX MED GY IP 250 OP 250 PS 637: Performed by: PATHOLOGY

## 2025-06-10 RX ORDER — SENNOSIDES 8.6 MG
325 CAPSULE ORAL ONCE
Status: COMPLETED | OUTPATIENT
Start: 2025-06-10 | End: 2025-06-10

## 2025-06-10 RX ORDER — HYDROXYZINE HYDROCHLORIDE 25 MG/1
25 TABLET, FILM COATED ORAL ONCE
Status: COMPLETED | OUTPATIENT
Start: 2025-06-10 | End: 2025-06-10

## 2025-06-10 RX ADMIN — ASPIRIN 325 MG: 325 TABLET ORAL at 08:10

## 2025-06-10 RX ADMIN — ANTICOAGULANT CITRATE DEXTROSE SOLUTION FORMULA A 699 ML: 12.25; 11; 3.65 SOLUTION INTRAVENOUS at 11:40

## 2025-06-10 RX ADMIN — HYDROXYZINE HYDROCHLORIDE 25 MG: 25 TABLET, FILM COATED ORAL at 08:10

## 2025-06-10 NOTE — PROCEDURES
Laboratory Medicine and Pathology  Transfusion Medicine - Apheresis Procedure    Ricky Pak MRN# [id number]   YOB: 1995 Age: 29 year old        Reason for consult: Sickle cell anemia           Assessment and Plan:   The patient is a 29 year old male with sickle cell anemia. He underwent maintenance red blood cell exchange. He tolerated the procedure well.         Chief Complaint:   anemia         History of Present Illness:   The patient is a 29 year old male with sickle cell anemia.His history is notable for CVA, gallstones, and iron overload.  He currently receives RBC exchange about 1x/month. 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  He reports no recent hospitalizations. He has no changes in medications or otherwise in health. He has no pain today and is feeling well.          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           Past Surgical History:     Past Surgical History:   Procedure Laterality Date    CHOLECYSTECTOMY N/A 2011    LIVER BIOPSY N/A 2007    iron overload monitoring    LIVER BIOPSY N/A 2008    monitoring for iron overload          Social History:   Single, travels for work, based out of [place]          Family History:   Not reviewed today          Immunizations:   Not reviewed today          Allergies:     Allergies   Allergen Reactions    Diphenhydramine Hives           Medications:   Not regularly taking any medications         Review of Systems:   Denied fever, chills, cough, shortness of breath, nausea, vomiting, diarrhea, pain         Exam:   BP 16/68 P 68 T 97.7 RR 16 O2 sat 97%  Alert, no  apparent distress  Breathing appears comfortable  No jaundice or scleral icterus  Moves extremities  PIV          Data:     Results for orders placed or performed during the hospital encounter of 06/10/25 (from the past 24 hours)   Hemoglobin and hematocrit   Result Value Ref Range    Hemoglobin 10.2 (L) 13.3 - 17.7 g/dL    Hematocrit 31.4 (L) 40.0 - 53.0 %    MCV 72 (L) 78 - 100 fL   Hemoglobin S with Reflex to Acid Gel Electrophoresis   Result Value Ref Range    Hemoglobin S Qualitative Positive, Previous Confirm (A) Negative    Hemoglobin S Quantitative 52.0 %   Hemoglobin and hematocrit   Result Value Ref Range    Hemoglobin 11.3 (L) 13.3 - 17.7 g/dL    Hematocrit 34.0 (L) 40.0 - 53.0 %    MCV 85 78 - 100 fL            Procedure Summary:   A red blood cell exchange was performed with Spectra Optia cell separator.  The vascular access was peripheral IV.  For premedication he was given 325 mg PO aspirin and 25 mg PO hydroxyzine. ACD-A was used for anticoagulation.  The replacement fluid was 16 units leukocyte reduced, Rh and [place] matched, fresh, hemoglobin S negative RBC unites..  The patient's vital signs were stable throughout.  The patient tolerated the procedure well.    Attestation: During the procedure the patient was directly seen and evaluated by me, Charo Ramirez MD, PhD  I have reviewed the chart and pertinent laboratory findings, and discussed the patient and the current procedure with the Apheresis nursing staff.    Charo Ramirez MD, PhD  Transfusion Medicine Attending  Laboratory Medicine & Pathology

## 2025-06-18 DIAGNOSIS — D57.1 HB-SS DISEASE WITHOUT CRISIS (H): Primary | ICD-10-CM

## 2025-07-01 ENCOUNTER — PATIENT OUTREACH (OUTPATIENT)
Dept: ONCOLOGY | Facility: CLINIC | Age: 30
End: 2025-07-01
Payer: COMMERCIAL

## 2025-07-01 NOTE — PROGRESS NOTES
[facility]: Cancer Care                                                                                          Completed chart audit to update Oncology Care Coordination enrollment status.  Reviewed POC and pt has appropriate follow up scheduled.       Signature:  Paula Viera RN

## 2025-07-19 ENCOUNTER — HEALTH MAINTENANCE LETTER (OUTPATIENT)
Age: 30
End: 2025-07-19

## (undated) RX ORDER — ACETAMINOPHEN 325 MG/1
TABLET ORAL
Status: DISPENSED
Start: 2022-08-19

## (undated) RX ORDER — ACETAMINOPHEN 325 MG/1
TABLET ORAL
Status: DISPENSED
Start: 2023-02-03